# Patient Record
Sex: FEMALE | Race: WHITE | Employment: OTHER | URBAN - METROPOLITAN AREA
[De-identification: names, ages, dates, MRNs, and addresses within clinical notes are randomized per-mention and may not be internally consistent; named-entity substitution may affect disease eponyms.]

---

## 2017-05-24 ENCOUNTER — TRANSFERRED RECORDS (OUTPATIENT)
Dept: HEALTH INFORMATION MANAGEMENT | Facility: CLINIC | Age: 72
End: 2017-05-24

## 2017-09-29 ENCOUNTER — TRANSFERRED RECORDS (OUTPATIENT)
Dept: HEALTH INFORMATION MANAGEMENT | Facility: CLINIC | Age: 72
End: 2017-09-29

## 2018-01-26 ENCOUNTER — OFFICE VISIT (OUTPATIENT)
Dept: FAMILY MEDICINE | Facility: CLINIC | Age: 73
End: 2018-01-26
Payer: MEDICARE

## 2018-01-26 VITALS
WEIGHT: 239 LBS | BODY MASS INDEX: 43.98 KG/M2 | HEIGHT: 62 IN | SYSTOLIC BLOOD PRESSURE: 138 MMHG | HEART RATE: 92 BPM | DIASTOLIC BLOOD PRESSURE: 83 MMHG

## 2018-01-26 DIAGNOSIS — Z79.4 TYPE 2 DIABETES MELLITUS WITHOUT COMPLICATION, WITH LONG-TERM CURRENT USE OF INSULIN (H): Primary | ICD-10-CM

## 2018-01-26 DIAGNOSIS — E66.01 MORBID OBESITY (H): ICD-10-CM

## 2018-01-26 DIAGNOSIS — E11.9 TYPE 2 DIABETES MELLITUS WITHOUT COMPLICATION, WITH LONG-TERM CURRENT USE OF INSULIN (H): Primary | ICD-10-CM

## 2018-01-26 DIAGNOSIS — E11.9 TYPE 2 DIABETES MELLITUS WITHOUT COMPLICATION, WITH LONG-TERM CURRENT USE OF INSULIN (H): ICD-10-CM

## 2018-01-26 DIAGNOSIS — Z79.4 TYPE 2 DIABETES MELLITUS WITHOUT COMPLICATION, WITH LONG-TERM CURRENT USE OF INSULIN (H): ICD-10-CM

## 2018-01-26 DIAGNOSIS — E11.9 TYPE 2 DIABETES MELLITUS WITHOUT COMPLICATION, WITHOUT LONG-TERM CURRENT USE OF INSULIN (H): Primary | ICD-10-CM

## 2018-01-26 PROBLEM — F41.1 GENERALIZED ANXIETY DISORDER: Status: ACTIVE | Noted: 2018-01-26

## 2018-01-26 PROBLEM — E66.9 OBESITY (BMI 30-39.9): Status: ACTIVE | Noted: 2017-11-17

## 2018-01-26 LAB — HBA1C MFR BLD: 8 % (ref 4.3–6)

## 2018-01-26 PROCEDURE — 83036 HEMOGLOBIN GLYCOSYLATED A1C: CPT | Performed by: INTERNAL MEDICINE

## 2018-01-26 PROCEDURE — 80048 BASIC METABOLIC PNL TOTAL CA: CPT | Performed by: INTERNAL MEDICINE

## 2018-01-26 PROCEDURE — 36415 COLL VENOUS BLD VENIPUNCTURE: CPT | Performed by: INTERNAL MEDICINE

## 2018-01-26 PROCEDURE — 99214 OFFICE O/P EST MOD 30 MIN: CPT | Performed by: INTERNAL MEDICINE

## 2018-01-26 RX ORDER — INSULIN DETEMIR 100 [IU]/ML
INJECTION, SOLUTION SUBCUTANEOUS
Qty: 30 ML | Refills: 11 | Status: SHIPPED | OUTPATIENT
Start: 2018-01-26 | End: 2019-03-12

## 2018-01-26 RX ORDER — ACETAMINOPHEN 160 MG
2000 TABLET,DISINTEGRATING ORAL DAILY
COMMUNITY
Start: 2011-11-10

## 2018-01-26 RX ORDER — ATORVASTATIN CALCIUM 40 MG/1
40 TABLET, FILM COATED ORAL DAILY
COMMUNITY
End: 2018-01-26

## 2018-01-26 RX ORDER — IBUPROFEN 200 MG
400 TABLET ORAL DAILY PRN
COMMUNITY
Start: 2013-11-19

## 2018-01-26 RX ORDER — NICOTINE POLACRILEX 4 MG/1
20 GUM, CHEWING ORAL DAILY
COMMUNITY
Start: 2012-05-16

## 2018-01-26 RX ORDER — ATORVASTATIN CALCIUM 40 MG/1
40 TABLET, FILM COATED ORAL DAILY
COMMUNITY
End: 2018-05-06

## 2018-01-26 RX ORDER — IBUPROFEN 200 MG
TABLET ORAL
Qty: 100 EACH | Refills: 11 | Status: SHIPPED | OUTPATIENT
Start: 2018-01-26 | End: 2020-04-10

## 2018-01-26 RX ORDER — BUDESONIDE AND FORMOTEROL FUMARATE DIHYDRATE 160; 4.5 UG/1; UG/1
2 AEROSOL RESPIRATORY (INHALATION) DAILY PRN
COMMUNITY

## 2018-01-26 RX ORDER — ALBUTEROL SULFATE 90 UG/1
AEROSOL, METERED RESPIRATORY (INHALATION) DAILY PRN
COMMUNITY
Start: 2002-08-29

## 2018-01-26 RX ORDER — GLIMEPIRIDE 2 MG/1
2 TABLET ORAL DAILY
COMMUNITY
Start: 2014-10-14 | End: 2018-04-17

## 2018-01-26 RX ORDER — ALPRAZOLAM 0.25 MG
1 TABLET ORAL DAILY PRN
COMMUNITY
Start: 2016-10-07

## 2018-01-26 RX ORDER — MULTIVIT WITH MINERALS/LUTEIN
1 TABLET ORAL DAILY
COMMUNITY

## 2018-01-26 NOTE — MR AVS SNAPSHOT
After Visit Summary   1/26/2018    Anahi Gramajo    MRN: [id number]           Patient Information     Date Of Birth          1945        Visit Information        Provider Department      1/26/2018 11:00 AM Marco Goldstein MD [facility]        Today's Diagnoses     Type 2 diabetes mellitus without complication, without long-term current use of insulin (H)    -  1    Type 2 diabetes mellitus without complication, with long-term current use of insulin (H)          Care Instructions    Discussed the diabetes med use and insulin dosing  Raise dose Levemir as 50U-am and 54U-pm  Will update the Levemir vial and syringe Rx's  Check lab tests today including fasting lipids  See back in 3 mo, call if questions          Follow-ups after your visit        Follow-up notes from your care team     Return in about 3 months (around 4/26/2018).      Future tests that were ordered for you today     Open Future Orders        Priority Expected Expires Ordered    Lipid panel reflex to direct LDL Fasting Routine 1/26/2018 1/26/2019 1/26/2018            Who to contact     If you have questions or need follow up information about today's clinic visit or your schedule please contact [facility] directly at [phone].  Normal or non-critical lab and imaging results will be communicated to you by MyChart, letter or phone within 4 business days after the clinic has received the results. If you do not hear from us within 7 days, please contact the clinic through MindQuilthart or phone. If you have a critical or abnormal lab result, we will notify you by phone as soon as possible.  Submit refill requests through [employer] Platform or call your pharmacy and they will forward the refill request to us. Please allow 3 business days for your refill to be completed.          Additional Information About Your Visit        MyChart Information     [employer] Platform lets you send messages to your doctor, view your test results, renew  "your prescriptions, schedule appointments and more. To sign up, go to [email or web address] . Click on \"Log in\" on the left side of the screen, which will take you to the Welcome page. Then click on \"Sign up Now\" on the right side of the page.     You will be asked to enter the access code listed below, as well as some personal information. Please follow the directions to create your username and password.     Your access code is: [email or web address]  Expires: 2018 11:59 AM     Your access code will  in 90 days. If you need help or a new code, please call your [place] clinic or [phone].        Care EveryWhere ID     This is your Care EveryWhere ID. This could be used by other organizations to access your [place] medical records  [id number]        Your Vitals Were     Pulse Height Breastfeeding? BMI (Body Mass Index)          92 1.575 m (5' 2\") No 43.71 kg/m2         Blood Pressure from Last 3 Encounters:   18 138/83    Weight from Last 3 Encounters:   18 108.4 kg (239 lb)              We Performed the Following     Basic metabolic panel  (Ca, Cl, CO2, Creat, Gluc, K, Na, BUN)     Hemoglobin A1c        Primary Care Provider Office Phone # Fax #    [facility] [phone] [address]  [facility] [address]        Equal Access to Services     FARIDA TAMEZ : Hadii aad ku hadasho Soomaali, waaxda luqadaha, qaybta kaalmada adeegyada, waxay eliu torrez . So [facility] [phone].    ATENCIÓN: Si habla español, tiene a schroeder disposición servicios gratuitos de asistencia lingüística. Llame al [phone].    We comply with applicable federal civil rights laws and [place] laws. We do not discriminate on the basis of race, color, national origin, age, disability, sex, sexual orientation, or gender identity.            Thank you!     Thank you for choosing [facility]  for your care. Our goal is always to provide you with excellent " care. Hearing back from our patients is one way we can continue to improve our services. Please take a few minutes to complete the written survey that you may receive in the mail after your visit with us. Thank you!             Your Updated Medication List - Protect others around you: Learn how to safely use, store and throw away your medicines at www.disposemymeds.org.          This list is accurate as of 1/26/18 11:59 AM.  Always use your most recent med list.                   Brand Name Dispense Instructions for use Diagnosis    ACCU-CHEK COMPACT DRUM test strip   Generic drug:  blood glucose monitoring      As directed. Tests twice daily        albuterol 108 (90 BASE) MCG/ACT Inhaler    PROAIR HFA/PROVENTIL HFA/VENTOLIN HFA     Inhale into the lungs daily as needed        ALPRAZolam 0.25 MG tablet    XANAX     Take 1 tablet by mouth daily as needed        aspirin 81 MG EC tablet      Take 81 mg by mouth daily        atorvastatin 40 MG tablet    LIPITOR     Take 40 mg by mouth daily        BD IRINEO U/F 32G X 4 MM   Generic drug:  insulin pen needle      USE AS DIRECTED        budesonide-formoterol 160-4.5 MCG/ACT Inhaler    SYMBICORT     Inhale 2 puffs into the lungs daily as needed        CENTRUM SILVER per tablet      Take 1 tablet by mouth daily        glimepiride 2 MG tablet    AMARYL     Take 2 mg by mouth daily        ibuprofen 200 MG tablet    ADVIL/MOTRIN     Take 400 mg by mouth daily as needed        insulin detemir 100 UNIT/ML injection    LEVEMIR     Inject 90 Units Subcutaneous At Bedtime        metFORMIN 1000 MG tablet    GLUCOPHAGE     Take 1,000 mg by mouth 2 times daily (with meals)        RA OMEPRAZOLE 20 MG tablet   Generic drug:  omeprazole      Take 20 mg by mouth daily        RA VITAMIN B-12 TR 1000 MCG Tbcr   Generic drug:  cyanocobalamin      Take 1,000 mcg by mouth daily        vitamin D3 2000 UNITS Caps      Take 2,000 Units by mouth daily

## 2018-01-26 NOTE — NURSING NOTE
"Chief Complaint   Patient presents with     Diabetes       Initial /83 (BP Location: Left arm)  Pulse 92  Ht 5' 2\" (1.575 m)  Wt 239 lb (108.4 kg)  Breastfeeding? No  BMI 43.71 kg/m2 Estimated body mass index is 43.71 kg/(m^2) as calculated from the following:    Height as of this encounter: 5' 2\" (1.575 m).    Weight as of this encounter: 239 lb (108.4 kg).  Medication Reconciliation: complete     Jose Nance      "

## 2018-01-26 NOTE — PROGRESS NOTES
Name: Anahi Gramajo is a 72 yr old woman with previous history of T2DM.  Patient previously seen by me at my previous clinic ([facility]), here to establish care with [facility].    Chief Complaint   Patient presents with     Diabetes     HPI:  Recent issues:  Feeling pretty well recently, though concerns about her DM insulin Rx's  States she was running out of the Levemir (vial) insulin, recently had (large amt?) Lev vial Rx from the [facility] clinic, hasn't picked up yet      Previous history of T2DM, diagnosed ~age 62  Recalls taking oral DM meds, including metformin  Had seen Dr. AMITA Flood/Herb, also Dr. RAMSES Daniel  ~2012 Began use of daily insulin  Current DM meds:    Metformin 1000 mg BID   glimeparide 2 mg as 1-QAM and 2-QPM   Levemir 50U sq BID  Uses Accucheck Guide meter, typically tests QAM  Recent FBG trends 113-172 mg/dl range, no hypoglycemia  DM complications:  None known  Last [facility]    She [facility] Keila [facility] for gen med evaluations    PMH/PSH:  No past medical history on file.  No past surgical history on file.    Family Hx:  No family history on file.      Social Hx:  Social History     Social History     Marital status:      Spouse name: N/A     Number of children: N/A     Years of education: N/A     Occupational History     Not on file.     Social History Main Topics     Smoking status: Former Smoker     Smokeless tobacco: Never Used     Alcohol use No     Drug use: No     Sexual activity: No     Other Topics Concern     Not on file     Social History Narrative     No narrative on file          MEDICATIONS:  has a current medication list which includes the following prescription(s): albuterol, atorvastatin, aspirin, vitamin d3, omeprazole, ibuprofen, insulin pen needle, cyanocobalamin, insulin detemir, glimepiride, metformin, alprazolam, budesonide-formoterol, blood glucose  "monitoring, and centrum silver.    ROS:     ROS: 10 point ROS neg other than the symptoms noted above in the HPI.    GENERAL: no weight changes, fevers, chills, malaise, night sweats.   HEENT: no dysphagia, odonophagia, diplopia, neck pain or tenderness  THYROID:  no apparent hyper or hypothyroid symptoms  CV: no chest pain, pressure, palpitations, skipped beats  LUNGS: no SOB, PEREZ, cough, sputum production, wheezing   ABDOMEN: no diarrhea, constipation, abdominal pain  EXTREMITIES: no rashes, ulcers, edema  NEUROLOGY: no changes in vision, tingling or numbness in hands or feet.   MSK: no muscle aches or pains, weakness  SKIN: no rashes or lesions  GYN: regular menstrual cycling, no hot flashes or night sweats  ENDOCRINE: no heat or cold intolerance    Physical Exam   VS: /83 (BP Location: Left arm)  Pulse 92  Ht 1.575 m (5' 2\")  Wt 108.4 kg (239 lb)  Breastfeeding? No  BMI 43.71 kg/m2  GENERAL: AXOX3, NAD, well dressed, answering questions appropriately, appears stated age.  THYROID:  normal gland, no apparent nodules or goiter  HEENT: neck non-tender, no exopthalmous, no proptosis, EOMI, no lig lag, no retraction  CV: RRR, no rubs, gallops, no murmurs  LUNGS: CTAB, no wheezes, rales, or ronchi  ABDOMEN: soft, nontender, nondistended, +BS, no organomegaly  EXTREMITIES: no edema, +pulses, no rashes, no lesions  NEUROLOGY: CN grossly intact, + monofilament, + DTR upper and lower extremity, no tremors  MSK: grossly intact  SKIN: no rashes, no lesions    LABS:    All pertinent notes, labs, and images personally reviewed by me.     A/P:  Encounter Diagnoses   Name Primary?     Type 2 diabetes mellitus without complication, without long-term current use of insulin (H) Yes     Type 2 diabetes mellitus without complication, with long-term current use of insulin (H)      Morbid obesity (H)        Labs ordered today:   Orders Placed This Encounter   Procedures     Hemoglobin A1c     Lipid panel reflex to direct " LDL Fasting     Basic metabolic panel  (Ca, Cl, CO2, Creat, Gluc, K, Na, BUN)     I am pleased Mrs Gramajo feeling well.  Recent FBG mildly elevated    Discussed general issues with the diabetes mellitus diagnosis and management  Reviewed the pathophysiology of T2DM  Discussed the [id number] test which reflects previous overall glucose levels or control  Discussed the importance of blood glucose (BG) testing to assess glucose trends  Provided general overview of diabetes (oral and injectable) medication use    Recommended:  Continue current MF and glimeparide dose plan  Raise insulin doses as Levemir 50U QAM and 54U QPM  Continue to test FBG daily, goal target  mg/dl  Will update her Levemir vial and insulin syringe Rx's to her [facility] pharmacy  Continue atorvastatin 40 mg po QD dose plan  Keep focus on diet, exercise, and weight management  Advise having fasting lipid panel testing and dilated eye examination, at least annually    Addressed patient questions today    Radiology/Consults ordered today: None    More than 50% of the time spent with Ms. Gramajo on counseling / coordinating her care.  Total appointment time was 30 minutes.      Follow-up:  3 mo.    Marco Goldstein MD  Endocrinology  [facility]

## 2018-01-26 NOTE — PATIENT INSTRUCTIONS
Discussed the diabetes med use and insulin dosing  Raise dose Levemir as 50U-am and 54U-pm  Will update the Levemir vial and syringe Rx's  Check lab tests today including fasting lipids  See back in 3 mo, call if questions

## 2018-01-27 LAB
ANION GAP SERPL CALCULATED.3IONS-SCNC: 7 MMOL/L (ref 3–14)
BUN SERPL-MCNC: 22 MG/DL (ref 7–30)
CALCIUM SERPL-MCNC: 9.2 MG/DL (ref 8.5–10.1)
CHLORIDE SERPL-SCNC: 104 MMOL/L (ref 94–109)
CO2 SERPL-SCNC: 28 MMOL/L (ref 20–32)
CREAT SERPL-MCNC: 0.78 MG/DL (ref 0.52–1.04)
GFR SERPL CREATININE-BSD FRML MDRD: 73 ML/MIN/1.7M2
GLUCOSE SERPL-MCNC: 112 MG/DL (ref 70–99)
POTASSIUM SERPL-SCNC: 4.6 MMOL/L (ref 3.4–5.3)
SODIUM SERPL-SCNC: 139 MMOL/L (ref 133–144)

## 2018-02-01 ENCOUNTER — MYC MEDICAL ADVICE (OUTPATIENT)
Dept: FAMILY MEDICINE | Facility: CLINIC | Age: 73
End: 2018-02-01

## 2018-02-01 ENCOUNTER — TRANSFERRED RECORDS (OUTPATIENT)
Dept: HEALTH INFORMATION MANAGEMENT | Facility: CLINIC | Age: 73
End: 2018-02-01

## 2018-04-17 ENCOUNTER — OFFICE VISIT (OUTPATIENT)
Dept: ENDOCRINOLOGY | Facility: CLINIC | Age: 73
End: 2018-04-17
Payer: MEDICARE

## 2018-04-17 VITALS
HEART RATE: 104 BPM | HEIGHT: 62 IN | SYSTOLIC BLOOD PRESSURE: 152 MMHG | BODY MASS INDEX: 44.35 KG/M2 | DIASTOLIC BLOOD PRESSURE: 74 MMHG | WEIGHT: 241 LBS

## 2018-04-17 DIAGNOSIS — E11.9 TYPE 2 DIABETES MELLITUS WITHOUT COMPLICATION, WITH LONG-TERM CURRENT USE OF INSULIN (H): Primary | ICD-10-CM

## 2018-04-17 DIAGNOSIS — Z79.4 TYPE 2 DIABETES MELLITUS WITHOUT COMPLICATION, WITH LONG-TERM CURRENT USE OF INSULIN (H): ICD-10-CM

## 2018-04-17 DIAGNOSIS — E11.9 TYPE 2 DIABETES MELLITUS WITHOUT COMPLICATION, WITH LONG-TERM CURRENT USE OF INSULIN (H): ICD-10-CM

## 2018-04-17 DIAGNOSIS — Z79.4 TYPE 2 DIABETES MELLITUS WITHOUT COMPLICATION, WITH LONG-TERM CURRENT USE OF INSULIN (H): Primary | ICD-10-CM

## 2018-04-17 DIAGNOSIS — E78.5 HYPERLIPIDEMIA LDL GOAL <100: ICD-10-CM

## 2018-04-17 PROBLEM — J45.909 ASTHMA: Status: ACTIVE | Noted: 2018-04-17

## 2018-04-17 PROBLEM — F33.0 MILD EPISODE OF RECURRENT MAJOR DEPRESSIVE DISORDER (H): Status: ACTIVE | Noted: 2017-11-17

## 2018-04-17 PROBLEM — F41.9 ANXIETY: Status: ACTIVE | Noted: 2018-04-17

## 2018-04-17 PROBLEM — H40.9 GLAUCOMA: Status: ACTIVE | Noted: 2018-04-17

## 2018-04-17 PROBLEM — M19.90 DJD (DEGENERATIVE JOINT DISEASE): Status: ACTIVE | Noted: 2018-04-17

## 2018-04-17 PROBLEM — J45.30 MILD PERSISTENT ASTHMA WITHOUT COMPLICATION: Status: ACTIVE | Noted: 2017-11-17

## 2018-04-17 PROBLEM — I72.9 ANEURYSM (H): Status: ACTIVE | Noted: 2017-06-26

## 2018-04-17 PROBLEM — I67.1 CEREBRAL ANEURYSM: Status: ACTIVE | Noted: 2017-11-17

## 2018-04-17 PROCEDURE — 82043 UR ALBUMIN QUANTITATIVE: CPT | Performed by: INTERNAL MEDICINE

## 2018-04-17 PROCEDURE — 36415 COLL VENOUS BLD VENIPUNCTURE: CPT | Performed by: INTERNAL MEDICINE

## 2018-04-17 PROCEDURE — 84460 ALANINE AMINO (ALT) (SGPT): CPT | Performed by: INTERNAL MEDICINE

## 2018-04-17 PROCEDURE — 99214 OFFICE O/P EST MOD 30 MIN: CPT | Performed by: INTERNAL MEDICINE

## 2018-04-17 PROCEDURE — 80061 LIPID PANEL: CPT | Performed by: INTERNAL MEDICINE

## 2018-04-17 RX ORDER — GLIMEPIRIDE 2 MG/1
TABLET ORAL
Qty: 90 TABLET | Refills: 11 | Status: SHIPPED | OUTPATIENT
Start: 2018-04-17 | End: 2018-04-17

## 2018-04-17 NOTE — PROGRESS NOTES
Name: Anahi Gramajo    Chief Complaint   Patient presents with     Diabetes     HPI:  Recent issues:  Feeling pretty well recently, no new health issues reported        Previous history of T2DM, diagnosed ~age 62  Recalls taking oral DM meds, including metformin  Had seen Dr. AMITA Flood/Herb, also Dr. RAMSES Daniel  ~2012 Began use of daily insulin  Current DM meds:    Metformin 1000 mg BID   glimeparide 2 mg as 1-QAM and 2-QPM   Levemir 50U QAM and 54U QPM  Uses Accucheck Guide meter, typically tests QAM  Recent FBG trends 120-150 mg/dl, avg 146 mg/dl  DM complications:  None known  Last [facility] exam [facility]2017 [facility]    She sees Dr. Keila Stevens/[facility] for gen med evaluations    PMH/PSH:  Past Medical History:   Diagnosis Date     Depression      Hyperlipidemia      Obesity      Type 2 diabetes mellitus (H)      No past surgical history on file.    Family Hx:  No family history on file.      Social Hx:  Social History     Social History     Marital status:      Spouse name: N/A     Number of children: N/A     Years of education: N/A     Occupational History     Not on file.     Social History Main Topics     Smoking status: Former Smoker     Smokeless tobacco: Never Used     Alcohol use No     Drug use: No     Sexual activity: No     Other Topics Concern     Not on file     Social History Narrative          MEDICATIONS:  has a current medication list which includes the following prescription(s): albuterol, atorvastatin, aspirin, vitamin d3, omeprazole, ibuprofen, insulin pen needle, cyanocobalamin, glimepiride, metformin, alprazolam, budesonide-formoterol, blood glucose monitoring, centrum silver, levemir vial, and insulin syringe.    ROS:     ROS: 10 point ROS neg other than the symptoms noted above in the HPI.    GENERAL: no weight changes, fevers, chills, malaise, night sweats.   HEENT: no dysphagia, odonophagia, diplopia, neck pain or tenderness  THYROID:  no apparent hyper or  "hypothyroid symptoms  CV: no chest pain, pressure, palpitations, skipped beats  LUNGS: no SOB, PEREZ, cough, sputum production, wheezing   ABDOMEN: no diarrhea, constipation, abdominal pain  EXTREMITIES: no rashes, ulcers, edema  NEUROLOGY: no changes in vision, tingling or numbness in hands or feet.   MSK: no muscle aches or pains, weakness  SKIN: no rashes or lesions  GYN: regular menstrual cycling, no hot flashes or night sweats  ENDOCRINE: no heat or cold intolerance    Physical Exam   VS: /74 (BP Location: Right arm, Cuff Size: Adult Large)  Pulse 104  Ht 5' 2\" (1.575 m)  Wt 241 lb (109.3 kg)  BMI 44.08 kg/m2  GENERAL: AXOX3, NAD, well dressed, answering questions appropriately, appears stated age.  THYROID:  normal gland, no apparent nodules or goiter  ABDOMEN: obese, soft, nontender, nondistended    LABS:    All pertinent notes, labs, and images personally reviewed by me.     A/P:  Encounter Diagnoses   Name Primary?     Type 2 diabetes mellitus without complication, with long-term current use of insulin (H) Yes     Hyperlipidemia LDL goal <100        Labs ordered today:   Orders Placed This Encounter   Procedures     Lipid panel reflex to direct LDL Fasting     Albumin Random Urine Quantitative with Creat Ratio     ALT     Comments:  Reviewed health history and overall diabetes issues.    Plan:  Discussed general issues with the diabetes mellitus diagnosis and management  We discussed the [id number] test which reflects previous overall glucose levels or control  Reviewed the importance of blood glucose (BG) testing to assess glucose trends  Provided general overview of diabetes (oral and injectable) medication use    Recommended:  Continue current MF and glimeparide dose plan  Also continue the insulin doses as Levemir 50U QAM and 54U QPM  Test BG QD vs BID, goal target  mg/dl   Will update her MF and glimeparide Rx's to her [facility] pharmacy  Continue atorvastatin 40 mg po " QD dose plan  Check fasting lipid and urine micral today  Keep focus on diet, exercise, and weight management  Advise having fasting lipid panel testing and dilated eye examination, at least annually    Addressed patient questions today    Radiology/Consults ordered today: None    More than 50% of the time spent with Ms. Gramajo on counseling / coordinating her care.  Total appointment time was 25 minutes.    Follow-up:  4 mo.    Marco Goldstein MD  Endocrinology  [facility]

## 2018-04-17 NOTE — MR AVS SNAPSHOT
After Visit Summary   4/17/2018    Anahi Gramajo    MRN: [id number]           Patient Information     Date Of Birth          1945        Visit Information        Provider Department      4/17/2018 11:30 AM Marco Goldstein MD [facility]        Today's Diagnoses     Type 2 diabetes mellitus without complication, with long-term current use of insulin (H)    -  1    Hyperlipidemia LDL goal <100           Follow-ups after your visit        Follow-up notes from your care team     Return in about 4 months (around 8/17/2018).      Your next 10 appointments already scheduled     Aug 14, 2018 11:30 AM CDT   Return Visit with Marco Goldstein MD   [facility] ([facility]    [address] [place] [id number] [phone]              Who to contact     If you have questions or need follow up information about today's clinic visit or your schedule please contact [facility] directly at [phone].  Normal or non-critical lab and imaging results will be communicated to you by Transinsighthart, letter or phone within 4 business days after the clinic has received the results. If you do not hear from us within 7 days, please contact the clinic through [employer]ioMEMS or phone. If you have a critical or abnormal lab result, we will notify you by phone as soon as possible.  Submit refill requests through [employer]ioMEMS or call your pharmacy and they will forward the refill request to us. Please allow 3 business days for your refill to be completed.          Additional Information About Your Visit        Transinsighthart Information     CardioMEMS gives you secure access to your electronic health record. If you see a primary care provider, you can also send messages to your care team and make appointments. If you have questions, please call your primary care clinic.  If you do not have a primary care provider, please call [phone] and they will assist you.        Care  "EveryWhere ID     This is your Care EveryWhere ID. This could be used by other organizations to access your [place] medical records  [id number]        Your Vitals Were     Pulse Height BMI (Body Mass Index)             104 5' 2\" (1.575 m) 44.08 kg/m2          Blood Pressure from Last 3 Encounters:   04/17/18 152/74   01/26/18 138/83    Weight from Last 3 Encounters:   04/17/18 241 lb (109.3 kg)   01/26/18 239 lb (108.4 kg)              We Performed the Following     Albumin Random Urine Quantitative with Creat Ratio     ALT     Lipid panel reflex to direct LDL Fasting        Primary Care Provider Office Phone # Fax #    [facility] [phone] [address]  [facility] [address]        Equal Access to Services     FARIDA TAMEZ : Hadii aad ku hadasho Soomaali, waaxda luqadaha, qaybta kaalmada adeegyada, imelda nowak hayvale torrez . So [facility] [phone].    ATENCIÓN: Si habla español, tiene a schroeder disposición servicios gratuitos de asistencia lingüística. Llame al [phone].    We comply with applicable federal civil rights laws and [place] laws. We do not discriminate on the basis of race, color, national origin, age, disability, sex, sexual orientation, or gender identity.            Thank you!     Thank you for choosing [facility]  for your care. Our goal is always to provide you with excellent care. Hearing back from our patients is one way we can continue to improve our services. Please take a few minutes to complete the written survey that you may receive in the mail after your visit with us. Thank you!             Your Updated Medication List - Protect others around you: Learn how to safely use, store and throw away your medicines at www.disposemymeds.org.          This list is accurate as of 4/17/18  6:48 PM.  Always use your most recent med list.                   Brand Name Dispense Instructions for use Diagnosis    ACCU-CHEK COMPACT DRUM " "test strip   Generic drug:  blood glucose monitoring      As directed. Tests twice daily        albuterol 108 (90 Base) MCG/ACT Inhaler    PROAIR HFA/PROVENTIL HFA/VENTOLIN HFA     Inhale into the lungs daily as needed        ALPRAZolam 0.25 MG tablet    XANAX     Take 1 tablet by mouth daily as needed        aspirin 81 MG EC tablet      Take 81 mg by mouth daily        atorvastatin 40 MG tablet    LIPITOR     Take 40 mg by mouth daily        BD IRINEO U/F 32G X 4 MM   Generic drug:  insulin pen needle      USE AS DIRECTED        budesonide-formoterol 160-4.5 MCG/ACT Inhaler    SYMBICORT     Inhale 2 puffs into the lungs daily as needed        CENTRUM SILVER per tablet      Take 1 tablet by mouth daily        glimepiride 2 MG tablet    AMARYL     Take 2 mg by mouth daily        ibuprofen 200 MG tablet    ADVIL/MOTRIN     Take 400 mg by mouth daily as needed        Insulin Syringe 29G X 1/2\" 1 ML Misc    SAFETY INSULIN SYRINGES    100 each    Use for sq insulin injections BID    Type 2 diabetes mellitus without complication, with long-term current use of insulin (H)       LEVEMIR VIAL 100 UNIT/ML injection   Generic drug:  insulin detemir     30 mL    Inject 50 units sq in morning and 54 units sq in evening, as directed    Type 2 diabetes mellitus without complication, with long-term current use of insulin (H)       metFORMIN 1000 MG tablet    GLUCOPHAGE     Take 1,000 mg by mouth 2 times daily (with meals)        RA OMEPRAZOLE 20 MG tablet   Generic drug:  omeprazole      Take 20 mg by mouth daily        RA VITAMIN B-12 TR 1000 MCG Tbcr   Generic drug:  cyanocobalamin      Take 1,000 mcg by mouth daily        vitamin D3 2000 units Caps      Take 2,000 Units by mouth daily          "

## 2018-04-17 NOTE — NURSING NOTE
"Chief Complaint   Patient presents with     Diabetes       Initial /74 (BP Location: Right arm, Cuff Size: Adult Large)  Pulse 104  Ht 5' 2\" (1.575 m)  Wt 241 lb (109.3 kg)  BMI 44.08 kg/m2 Estimated body mass index is 44.08 kg/(m^2) as calculated from the following:    Height as of this encounter: 5' 2\" (1.575 m).    Weight as of this encounter: 241 lb (109.3 kg).  Medication Reconciliation: complete       Jose Nance      "

## 2018-04-18 LAB
CREAT UR-MCNC: 152 MG/DL
MICROALBUMIN UR-MCNC: 20 MG/L
MICROALBUMIN/CREAT UR: 12.96 MG/G CR (ref 0–25)

## 2018-04-18 RX ORDER — GLIMEPIRIDE 2 MG/1
TABLET ORAL
Qty: 270 TABLET | Refills: 3 | Status: SHIPPED | OUTPATIENT
Start: 2018-04-18 | End: 2019-05-03

## 2018-04-18 NOTE — TELEPHONE ENCOUNTER
"Rx sent yesterday for #90 with 11 refills  Pharmacy requesting 90 day supply instead  Prescription approved per [facility]ill [facility].  Brandie HERNÁNDEZ, ADAN    Requested Prescriptions   Pending Prescriptions Disp Refills     glimepiride (AMARYL) 2 MG tablet [Pharmacy Med Name: GLIMEPIRIDE 2MG TABLETS] 270 tablet 11     Sig: TAKE 1 TABLET BY MOUTH IN THE MORNING AND 2 TABLETS IN THE EVENING AS DIRECTED    Sulfonylurea Agents Failed    4/17/2018  8:21 PM       Failed - Blood pressure less than 140/90 in past 6 months    BP Readings from Last 3 Encounters:   04/17/18 152/74   01/26/18 138/83                Failed - Patient has documented LDL within the past 12 mos.    No lab results found.         Failed - Patient has had a Microalbumin in the past 12 mos.    No lab results found.         Passed - Patient has documented A1c within the specified period of time.    Recent Labs   Lab Test  01/26/18   1223   A1C  8.0*            Passed - Patient is age 18 or older       Passed - No active pregnancy on record       Passed - Patient has a recent creatinine (normal) within the past 12 mos.    Recent Labs   Lab Test  01/26/18   1223   CR  0.78            Passed - Patient has not had a positive pregnancy test within the past 12 mos.       Passed - Recent (6 mo) or future (30 days) visit within the authorizing provider's specialty    Patient had office visit in the last 6 months or has a visit in the next 30 days with authorizing provider or within the authorizing provider's specialty.  See \"Patient Info\" tab in inbasket, or \"Choose Columns\" in Meds & Orders section of the refill encounter.              "

## 2018-04-19 LAB
ALT SERPL W P-5'-P-CCNC: 36 U/L (ref 0–50)
CHOLEST SERPL-MCNC: 129 MG/DL
HDLC SERPL-MCNC: 41 MG/DL
LDLC SERPL CALC-MCNC: 57 MG/DL
NONHDLC SERPL-MCNC: 88 MG/DL
TRIGL SERPL-MCNC: 154 MG/DL

## 2018-04-25 DIAGNOSIS — E11.9 TYPE 2 DIABETES MELLITUS WITHOUT COMPLICATION, WITH LONG-TERM CURRENT USE OF INSULIN (H): ICD-10-CM

## 2018-04-25 DIAGNOSIS — Z79.4 TYPE 2 DIABETES MELLITUS WITHOUT COMPLICATION, WITH LONG-TERM CURRENT USE OF INSULIN (H): ICD-10-CM

## 2018-04-25 NOTE — TELEPHONE ENCOUNTER
"Last Written Prescription Date:  4/17/18  Last Fill Quantity: 60 tablet,  # refills: 11   Last office visit: 4/17/2018 with prescribing provider:  Angelita   Future Office Visit:      Requested Prescriptions   Pending Prescriptions Disp Refills     metFORMIN (GLUCOPHAGE) 1000 MG tablet [Pharmacy Med Name: METFORMIN 1000MG TABLETS] 180 tablet 11     Sig: TAKE 1 TABLET(1000 MG) BY MOUTH TWICE DAILY WITH MEALS    Biguanide Agents Failed    4/25/2018 10:02 AM       Failed - Blood pressure less than 140/90 in past 6 months    BP Readings from Last 3 Encounters:   04/17/18 152/74   01/26/18 138/83                Passed - Patient has documented LDL within the past 12 mos.    Recent Labs   Lab Test  04/17/18   1200   LDL  57            Passed - Patient has had a Microalbumin in the past 12 mos.    Recent Labs   Lab Test  04/17/18   1206   MICROL  20   UMALCR  12.96            Passed - Patient is age 10 or older       Passed - Patient has documented A1c within the specified period of time.    Recent Labs   Lab Test  01/26/18   1223   A1C  8.0*            Passed - Patient's CR is NOT>1.4 OR Patient's EGFR is NOT<45 within past 12 mos.    Recent Labs   Lab Test  01/26/18   1223   GFRESTIMATED  73   GFRESTBLACK  88       Recent Labs   Lab Test  01/26/18   1223   CR  0.78            Passed - Patient does NOT have a diagnosis of CHF.       Passed - Patient is not pregnant       Passed - Patient has not had a positive pregnancy test within the past 12 mos.        Passed - Recent (6 mo) or future (30 days) visit within the authorizing provider's specialty    Patient had office visit in the last 6 months or has a visit in the next 30 days with authorizing provider or within the authorizing provider's specialty.  See \"Patient Info\" tab in inbasket, or \"Choose Columns\" in Meds & Orders section of the refill encounter.              "

## 2018-05-04 ENCOUNTER — MYC MEDICAL ADVICE (OUTPATIENT)
Dept: ENDOCRINOLOGY | Facility: CLINIC | Age: 73
End: 2018-05-04

## 2018-05-04 DIAGNOSIS — E78.5 HYPERLIPIDEMIA LDL GOAL <130: Primary | ICD-10-CM

## 2018-05-04 DIAGNOSIS — E78.5 HYPERLIPIDEMIA LDL GOAL <100: ICD-10-CM

## 2018-05-06 RX ORDER — ATORVASTATIN CALCIUM 40 MG/1
40 TABLET, FILM COATED ORAL DAILY
Qty: 90 TABLET | Refills: 3 | Status: SHIPPED | OUTPATIENT
Start: 2018-05-06 | End: 2019-05-03

## 2018-08-14 ENCOUNTER — OFFICE VISIT (OUTPATIENT)
Dept: ENDOCRINOLOGY | Facility: CLINIC | Age: 73
End: 2018-08-14
Payer: MEDICARE

## 2018-08-14 VITALS — SYSTOLIC BLOOD PRESSURE: 144 MMHG | BODY MASS INDEX: 44.45 KG/M2 | WEIGHT: 243 LBS | DIASTOLIC BLOOD PRESSURE: 78 MMHG

## 2018-08-14 DIAGNOSIS — E11.9 TYPE 2 DIABETES MELLITUS WITHOUT COMPLICATION, WITH LONG-TERM CURRENT USE OF INSULIN (H): Primary | ICD-10-CM

## 2018-08-14 DIAGNOSIS — Z79.4 TYPE 2 DIABETES MELLITUS WITHOUT COMPLICATION, WITH LONG-TERM CURRENT USE OF INSULIN (H): Primary | ICD-10-CM

## 2018-08-14 LAB — HBA1C MFR BLD: 8 % (ref 0–5.6)

## 2018-08-14 PROCEDURE — 84460 ALANINE AMINO (ALT) (SGPT): CPT | Performed by: INTERNAL MEDICINE

## 2018-08-14 PROCEDURE — 99214 OFFICE O/P EST MOD 30 MIN: CPT | Performed by: INTERNAL MEDICINE

## 2018-08-14 PROCEDURE — 83036 HEMOGLOBIN GLYCOSYLATED A1C: CPT | Performed by: INTERNAL MEDICINE

## 2018-08-14 PROCEDURE — 84443 ASSAY THYROID STIM HORMONE: CPT | Performed by: INTERNAL MEDICINE

## 2018-08-14 PROCEDURE — 80048 BASIC METABOLIC PNL TOTAL CA: CPT | Performed by: INTERNAL MEDICINE

## 2018-08-14 PROCEDURE — 36415 COLL VENOUS BLD VENIPUNCTURE: CPT | Performed by: INTERNAL MEDICINE

## 2018-08-14 NOTE — PROGRESS NOTES
Name: Anahi Gramajo    Chief Complaint   Patient presents with     Diabetes     HPI:  Recent issues:  Here for f/u diabetes evaluation appt  Feeling pretty well recently, but knee pains.        Previous history of T2DM, diagnosed ~age 62  Recalls taking oral DM meds, including metformin  Had seen Dr. AMITA Flood/Herb, also Dr. RAMSES Daniel  ~2012 Began use of daily insulin  Current DM meds:    Metformin 1000 mg BID   glimeparide 2 mg as 1-QAM and 2-QPM   Levemir 50U QAM and 54U QPM  Uses Accucheck Guide meter, typically tests QAM  Recent FBG trends  mg/dl, avg 153 mg/dl  Lab Results   Component Value Date    A1C 8.0 (H) 08/14/2018    A1C 8.0 (H) 01/26/2018     DM complications:  None known  Last [facility]    She sees Dr. Keila Stevens/[facility] for gen med evaluations    PMH/PSH:  Past Medical History:   Diagnosis Date     Depression      Hyperlipidemia      Obesity      Type 2 diabetes mellitus (H)      No past surgical history on file.    Family Hx:  No family history on file.      Social Hx:  Social History     Social History     Marital status:      Spouse name: N/A     Number of children: N/A     Years of education: N/A     Occupational History     Not on file.     Social History Main Topics     Smoking status: Former Smoker     Smokeless tobacco: Never Used     Alcohol use No     Drug use: No     Sexual activity: No     Other Topics Concern     Not on file     Social History Narrative          MEDICATIONS:  has a current medication list which includes the following prescription(s): albuterol, alprazolam, aspirin, atorvastatin, blood glucose monitoring, budesonide-formoterol, vitamin d3, cyanocobalamin, glimepiride, ibuprofen, insulin syringe, levemir vial, metformin, centrum silver, and omeprazole.    ROS:     ROS: 10 point ROS neg other than the symptoms noted above in the HPI.    GENERAL: mild fatigue, wt stable; fevers, chills, malaise, night sweats.    HEENT: no dysphagia, odonophagia, diplopia, neck pain or tenderness  THYROID:  no apparent hyper or hypothyroid symptoms  CV: no chest pain, pressure, palpitations, skipped beats  LUNGS: no SOB, PEREZ, cough, sputum production, wheezing   ABDOMEN: no diarrhea, constipation, abdominal pain  EXTREMITIES: no rashes, ulcers, edema  NEUROLOGY: no changes in vision, tingling or numbness in hands or feet.   MSK: pains at knees, low back, and left shoulder; denies muscle weakness  SKIN: no rashes or lesions  GYN: regular menstrual cycling, no hot flashes or night sweats  ENDOCRINE: no heat or cold intolerance    Physical Exam   VS: /78 (BP Location: Right arm, Patient Position: Sitting, Cuff Size: Adult Large)  Wt 110.2 kg (243 lb)  BMI 44.45 kg/m2  GENERAL: AXOX3, NAD, well dressed, answering questions appropriately, appears stated age.  THYROID:  normal gland, no apparent nodules or goiter  ABDOMEN: obese, soft, nontender, nondistended    LABS:    All pertinent notes, labs, and images personally reviewed by me.     A/P:  Encounter Diagnosis   Name Primary?     Type 2 diabetes mellitus without complication, with long-term current use of insulin (H) Yes       Labs ordered today:   Orders Placed This Encounter   Procedures     Basic metabolic panel     Hemoglobin A1c     TSH     ALT     Comments:  Reviewed health history and overall diabetes issues.    Plan:  Discussed general issues with the diabetes mellitus diagnosis and management  We discussed the [id number] test which reflects previous overall glucose levels or control  Reviewed the importance of blood glucose (BG) testing to assess glucose trends  Provided general overview of diabetes (oral and injectable) medication use    Recommended:  Continue current MF and glimeparide dose plan  Also continue the insulin doses as Levemir 50U QAM and 58U QPM  Need more glucose trend information   Test BG QD vs BID, goal target  mg/dl   Consider wearing LibrePro  CGMS sensor with CDE evaluation  Continue atorvastatin 40 mg po QD dose plan  Check fasting lipid and urine micral today  Keep focus on diet, exercise, and weight management  Advise having fasting lipid panel testing and dilated eye examination, at least annually    Addressed patient questions today    Radiology/Consults ordered today: None    More than 50% of the time spent with Ms. Gramajo on counseling / coordinating her care.  Total appointment time was 25 minutes.    Follow-up:  6 mo.    Marco Goldstein MD  Endocrinology  [facility]

## 2018-08-14 NOTE — MR AVS SNAPSHOT
After Visit Summary   8/14/2018    Anahi Gramajo    MRN: [id number]           Patient Information     Date Of Birth          1945        Visit Information        Provider Department      8/14/2018 11:30 AM Marco Goldstein MD [facility]        Today's Diagnoses     Type 2 diabetes mellitus without complication, with long-term current use of insulin (H)    -  1       Follow-ups after your visit        Follow-up notes from your care team     Return in about 4 months (around 12/14/2018).      Your next 10 appointments already scheduled     Dec 17, 2018 11:00 AM CST   Return Visit with Marco Goldstein MD   [facility] ([facility]    [address] [place] [id number] [phone]              Who to contact     If you have questions or need follow up information about today's clinic visit or your schedule please contact [facility] directly at [phone].  Normal or non-critical lab and imaging results will be communicated to you by [employer], letter or phone within 4 business days after the clinic has received the results. If you do not hear from us within 7 days, please contact the clinic through [employer] or phone. If you have a critical or abnormal lab result, we will notify you by phone as soon as possible.  Submit refill requests through "[employer]" or call your pharmacy and they will forward the refill request to us. Please allow 3 business days for your refill to be completed.          Additional Information About Your Visit        MyChart Information     "[employer]" gives you secure access to your electronic health record. If you see a primary care provider, you can also send messages to your care team and make appointments. If you have questions, please call your primary care clinic.  If you do not have a primary care provider, please call [phone] and they will assist you.        Care EveryWhere ID     This is your Care  EveryWhere ID. This could be used by other organizations to access your [facility] medical records  [id number]        Your Vitals Were     BMI (Body Mass Index)                   44.45 kg/m2            Blood Pressure from Last 3 Encounters:   08/14/18 144/78   04/17/18 152/74   01/26/18 138/83    Weight from Last 3 Encounters:   08/14/18 110.2 kg (243 lb)   04/17/18 109.3 kg (241 lb)   01/26/18 108.4 kg (239 lb)              We Performed the Following     ALT     Basic metabolic panel     Hemoglobin A1c     TSH        Primary Care Provider Office Phone # Fax #    [facility] [phone] [address]  [facility] [id number]        Equal Access to Services     FARIDA TAMEZ : Hadii aad ku hadasho Soomaali, waaxda luqadaha, qaybta kaalmada adeegyada, imelda torrez . So [facility] [phone].    ATENCIÓN: Si habla español, tiene a schroeder disposición servicios gratuitos de asistencia lingüística. Llame al [phone].    We comply with applicable federal civil rights laws and [place] laws. We do not discriminate on the basis of race, color, national origin, age, disability, sex, sexual orientation, or gender identity.            Thank you!     Thank you for choosing [facility]  for your care. Our goal is always to provide you with excellent care. Hearing back from our patients is one way we can continue to improve our services. Please take a few minutes to complete the written survey that you may receive in the mail after your visit with us. Thank you!             Your Updated Medication List - Protect others around you: Learn how to safely use, store and throw away your medicines at www.disposemymeds.org.          This list is accurate as of 8/14/18  4:42 PM.  Always use your most recent med list.                   Brand Name Dispense Instructions for use Diagnosis    ACCU-CHEK COMPACT DRUM test strip   Generic drug:  blood glucose monitoring      As  "directed. Tests twice daily        albuterol 108 (90 Base) MCG/ACT inhaler    PROAIR HFA/PROVENTIL HFA/VENTOLIN HFA     Inhale into the lungs daily as needed        ALPRAZolam 0.25 MG tablet    XANAX     Take 1 tablet by mouth daily as needed        aspirin 81 MG EC tablet      Take 81 mg by mouth daily        atorvastatin 40 MG tablet    LIPITOR    90 tablet    Take 1 tablet (40 mg) by mouth daily    Hyperlipidemia LDL goal <100       budesonide-formoterol 160-4.5 MCG/ACT Inhaler    SYMBICORT     Inhale 2 puffs into the lungs daily as needed        CENTRUM SILVER per tablet      Take 1 tablet by mouth daily        glimepiride 2 MG tablet    AMARYL    270 tablet    TAKE 1 TABLET BY MOUTH IN THE MORNING AND 2 TABLETS IN THE EVENING AS DIRECTED    Type 2 diabetes mellitus without complication, with long-term current use of insulin (H)       ibuprofen 200 MG tablet    ADVIL/MOTRIN     Take 400 mg by mouth daily as needed        Insulin Syringe 29G X 1/2\" 1 ML Misc    SAFETY INSULIN SYRINGES    100 each    Use for sq insulin injections BID    Type 2 diabetes mellitus without complication, with long-term current use of insulin (H)       LEVEMIR VIAL 100 UNIT/ML injection   Generic drug:  insulin detemir     30 mL    Inject 50 units sq in morning and 54 units sq in evening, as directed    Type 2 diabetes mellitus without complication, with long-term current use of insulin (H)       metFORMIN 1000 MG tablet    GLUCOPHAGE    180 tablet    TAKE 1 TABLET(1000 MG) BY MOUTH TWICE DAILY WITH MEALS    Type 2 diabetes mellitus without complication, with long-term current use of insulin (H)       RA OMEPRAZOLE 20 MG tablet   Generic drug:  omeprazole      Take 20 mg by mouth daily        RA VITAMIN B-12 TR 1000 MCG Tbcr   Generic drug:  cyanocobalamin      Take 1,000 mcg by mouth daily        vitamin D3 2000 units Caps      Take 2,000 Units by mouth daily          "

## 2018-08-15 LAB
ALT SERPL W P-5'-P-CCNC: 42 U/L (ref 0–50)
ANION GAP SERPL CALCULATED.3IONS-SCNC: 7 MMOL/L (ref 3–14)
BUN SERPL-MCNC: 16 MG/DL (ref 7–30)
CALCIUM SERPL-MCNC: 8.9 MG/DL (ref 8.5–10.1)
CHLORIDE SERPL-SCNC: 106 MMOL/L (ref 94–109)
CO2 SERPL-SCNC: 27 MMOL/L (ref 20–32)
CREAT SERPL-MCNC: 0.7 MG/DL (ref 0.52–1.04)
GFR SERPL CREATININE-BSD FRML MDRD: 82 ML/MIN/1.7M2
GLUCOSE SERPL-MCNC: 75 MG/DL (ref 70–99)
POTASSIUM SERPL-SCNC: 4.1 MMOL/L (ref 3.4–5.3)
SODIUM SERPL-SCNC: 140 MMOL/L (ref 133–144)
TSH SERPL DL<=0.005 MIU/L-ACNC: 1.92 MU/L (ref 0.4–4)

## 2018-12-11 ENCOUNTER — TELEPHONE (OUTPATIENT)
Dept: ENDOCRINOLOGY | Facility: CLINIC | Age: 73
End: 2018-12-11

## 2018-12-11 NOTE — TELEPHONE ENCOUNTER
Reason for Call:  FYI     Detailed comments: Pt wanted to let DR. Goldstein know that she had to cancel her apt on 12/17 due to having Shoulder surgery on 1/2- pt states she will have her A1C checked during her Pre Op physical    Phone Number Patient can be reached at: Home number on file [phone] (home)    Best Time: any    Can we leave a detailed message on this number? YES    Call taken on 12/11/2018 at 8:54 AM by Maria T Ceja

## 2019-03-12 ENCOUNTER — TELEPHONE (OUTPATIENT)
Dept: ENDOCRINOLOGY | Facility: CLINIC | Age: 74
End: 2019-03-12

## 2019-03-12 DIAGNOSIS — Z79.4 TYPE 2 DIABETES MELLITUS WITHOUT COMPLICATION, WITH LONG-TERM CURRENT USE OF INSULIN (H): ICD-10-CM

## 2019-03-12 DIAGNOSIS — E11.9 TYPE 2 DIABETES MELLITUS WITHOUT COMPLICATION, WITH LONG-TERM CURRENT USE OF INSULIN (H): ICD-10-CM

## 2019-03-12 RX ORDER — INSULIN DETEMIR 100 [IU]/ML
INJECTION, SOLUTION SUBCUTANEOUS
Qty: 30 ML | Refills: 11 | Status: SHIPPED | OUTPATIENT
Start: 2019-03-12 | End: 2020-01-31

## 2019-03-12 NOTE — TELEPHONE ENCOUNTER
Reason for Call:  Med Question     Detailed comments: Pt is calling regarding her LEVEMIR VIAL 100 UNIT/ML soln    Pt states when she contacted the pharmacy she was told that Dr. Goldstein cancelled this Rx in December pt states she is unaware of this     Phone Number Patient can be reached at: Home number on file [phone] (home)    Best Time: any    Can we leave a detailed message on this number? YES    Call taken on 3/12/2019 at 2:32 PM by Maria T Ceja

## 2019-03-12 NOTE — TELEPHONE ENCOUNTER
Dr. Goldstein,    Was pt suppose to stop taking Levemir in December? Pharmacy told pt you cancelled this in December.  Please advise.    Thank you,  Colin ASKEW RN

## 2019-03-13 NOTE — TELEPHONE ENCOUNTER
Message noted.  I am unaware of any advice to stop the Levemir insulin use... Unless she wished to change to a once-per-day basal insulin.  We have not sent any prescriptions for other insulins... So I advise she continue the current Levemir twice per day plan.  I have sent the Levemir Rx to her pharmacy.  Please relay message, thanks.    ELIJAH Goldstein MD  Endocrinology  [facility]

## 2019-03-25 ENCOUNTER — OFFICE VISIT (OUTPATIENT)
Dept: ENDOCRINOLOGY | Facility: CLINIC | Age: 74
End: 2019-03-25
Payer: MEDICARE

## 2019-03-25 VITALS
BODY MASS INDEX: 44.35 KG/M2 | HEIGHT: 62 IN | DIASTOLIC BLOOD PRESSURE: 73 MMHG | WEIGHT: 241 LBS | SYSTOLIC BLOOD PRESSURE: 151 MMHG | HEART RATE: 89 BPM

## 2019-03-25 DIAGNOSIS — Z79.4 TYPE 2 DIABETES MELLITUS WITHOUT COMPLICATION, WITH LONG-TERM CURRENT USE OF INSULIN (H): Primary | ICD-10-CM

## 2019-03-25 DIAGNOSIS — E66.01 MORBID OBESITY (H): ICD-10-CM

## 2019-03-25 DIAGNOSIS — E11.9 TYPE 2 DIABETES MELLITUS WITHOUT COMPLICATION, WITH LONG-TERM CURRENT USE OF INSULIN (H): Primary | ICD-10-CM

## 2019-03-25 PROCEDURE — 99214 OFFICE O/P EST MOD 30 MIN: CPT | Performed by: INTERNAL MEDICINE

## 2019-03-25 ASSESSMENT — MIFFLIN-ST. JEOR: SCORE: 1556.42

## 2019-03-25 NOTE — PROGRESS NOTES
Name: Anahi Gramajo    Chief Complaint   Patient presents with     Diabetes     HPI:  Recent issues:  Here for f/u diabetes evaluation   Had left shoulder rotator cuff surgery in 1/2019  Recent URI symptoms with wheezing, also pill dysphagia... Now on Prednisone, plan for upper endoscopy          Previous history of T2DM, diagnosed ~age 62  Recalls taking oral DM meds, including metformin  Had seen Dr. AMITA Flood/[facility], also Dr. RAMSES Daniel  ~2012 Began use of daily insulin  Current DM meds:    Metformin 1000 mg BID   glimeparide 2 mg as 1-QAM and 2-QPM   Levemir 50U QAM and 54U QPM  Uses Accucheck Guide meter, typically tests QAM   No recent BG meter data   Perceives higher afternoon BG levels while on Prednisone    Previous [facility] labs include:      Recent [facility] labs include:  Lab Results   Component Value Date    A1C 8.0 (H) 08/14/2018     08/14/2018    POTASSIUM 4.1 08/14/2018    CHLORIDE 106 08/14/2018    CO2 27 08/14/2018    ANIONGAP 7 08/14/2018    GLC 75 08/14/2018    BUN 16 08/14/2018    CR 0.70 08/14/2018    GFRESTIMATED 82 08/14/2018    GFRESTBLACK >90 08/14/2018    LESTER 8.9 08/14/2018    CHOL 129 04/17/2018    TRIG 154 (H) 04/17/2018    HDL 41 (L) 04/17/2018    LDL 57 04/17/2018    NHDL 88 04/17/2018    UCRR 152 04/17/2018    MICROL 20 04/17/2018    UMALCR 12.96 04/17/2018     Recent [facility] labs include:        Last [facility] exam ~[facility]  DM complications:  None known      She sees Dr. Keila Stevens[facility] for gen med evaluations    PMH/PSH:  Past Medical History:   Diagnosis Date     Asthma      Depression      Hyperlipidemia      Obesity      Type 2 diabetes mellitus (H)      No past surgical history on file.    Family Hx:  No family history on file.      Social Hx:  Social History     Socioeconomic History     Marital status:      Spouse name: Not on file     Number of children: Not on file     Years of education: Not on file     Highest education level: Not on file    Occupational History     Not on file   Social Needs     Financial resource strain: Not on file     Food insecurity:     Worry: Not on file     Inability: Not on file     Transportation needs:     Medical: Not on file     Non-medical: Not on file   Tobacco Use     Smoking status: Former Smoker     Smokeless tobacco: Never Used   Substance and Sexual Activity     Alcohol use: No     Drug use: No     Sexual activity: No     Partners: Male   Lifestyle     Physical activity:     Days per week: Not on file     Minutes per session: Not on file     Stress: Not on file   Relationships     Social connections:     Talks on phone: Not on file     Gets together: Not on file     Attends Confucianism service: Not on file     Active member of club or organization: Not on file     Attends meetings of clubs or organizations: Not on file     Relationship status: Not on file     Intimate partner violence:     Fear of current or ex partner: Not on file     Emotionally abused: Not on file     Physically abused: Not on file     Forced sexual activity: Not on file   Other Topics Concern     Not on file   Social History Narrative     Not on file          MEDICATIONS:  has a current medication list which includes the following prescription(s): albuterol, alprazolam, aspirin, atorvastatin, blood glucose, budesonide-formoterol, vitamin d3, vitamin b-12, glimepiride, ibuprofen, insulin nph, insulin syringe-needle u-100, levemir vial, metformin, multivitamin, and omeprazole.    ROS:     ROS: 10 point ROS neg other than the symptoms noted above in the HPI.    GENERAL: mild fatigue, wt stable; fevers, chills, malaise, night sweats.   HEENT: no dysphagia, odonophagia, diplopia, neck pain or tenderness  THYROID:  no apparent hyper or hypothyroid symptoms  CV: no chest pain, pressure, palpitations, skipped beats  LUNGS: recent wheezing; no SOB, PEREZ, cough  ABDOMEN: no diarrhea, constipation, abdominal pain  EXTREMITIES: no rashes, ulcers,  "edema  NEUROLOGY: no changes in vision, tingling or numbness in hands or feet.   MSK: pains at knees, low back, and left shoulder; denies muscle weakness  SKIN: no rashes or lesions  GYN: no menses; no hot flashes or night sweats  ENDOCRINE: no heat or cold intolerance    Physical Exam   VS: /73   Pulse 89   Ht 1.575 m (5' 2\")   Wt 109.3 kg (241 lb)   BMI 44.08 kg/m    GENERAL: AXOX3, NAD, well dressed, answering questions appropriately, appears stated age.  THYROID:  normal gland, no apparent nodules or goiter  ABDOMEN: obese, soft, nontender, nondistended    LABS:    All pertinent notes, labs, and images personally reviewed by me.     A/P:  Encounter Diagnoses   Name Primary?     Type 2 diabetes mellitus without complication, with long-term current use of insulin (H) Yes     Morbid obesity (H)      Comments:  Reviewed health history and overall diabetes issues.    Plan:  Discussed general issues with the diabetes mellitus diagnosis and management  We discussed the [id number] test which reflects previous overall glucose levels or control  Reviewed the importance of blood glucose (BG) testing to assess glucose trends  Provided general overview of diabetes (oral and injectable) medication use    Recommended:  Continue current MF, glimeparide, and Levemir insulin dose plan  When current supply of the (expensive) Levemir gone, change to Relion NPH vial/syringe use   Discussed use of NPH insulin, pre-dose mixing process   Plan to use nearby ([place] [facility] pharmacy, gave her paper Rx today  Need more glucose trend information   Test BG QD vs BID, goal target  mg/dl   Discussed the hyperglycemia side effect of Prednisone use   After changing to the NPH insulin use, message me with BG trend update for dosing advice   Consider wearing LibrePro CGMS sensor with CDE evaluation  Continue atorvastatin 40 mg po QD dose plan  No labs ordered today  Keep focus on diet, exercise, and weight " management  Advise having fasting lipid panel testing and dilated eye examination, at least annually    Anahi to follow up with Primary Care provider regarding elevated blood pressure.  Addressed patient questions today    Labs ordered today:   No orders of the defined types were placed in this encounter.    Radiology/Consults ordered today: None    More than 50% of the time spent with Ms. Gramajo on counseling / coordinating her care.  Total appointment time was 25 minutes.    Follow-up:  3 mo.    Marco Goldstein MD  Endocrinology  [facility]

## 2019-06-19 ENCOUNTER — TELEPHONE (OUTPATIENT)
Dept: ENDOCRINOLOGY | Facility: CLINIC | Age: 74
End: 2019-06-19

## 2019-06-19 DIAGNOSIS — Z79.4 TYPE 2 DIABETES MELLITUS WITHOUT COMPLICATION, WITH LONG-TERM CURRENT USE OF INSULIN (H): Primary | ICD-10-CM

## 2019-06-19 DIAGNOSIS — E11.9 TYPE 2 DIABETES MELLITUS WITHOUT COMPLICATION, WITH LONG-TERM CURRENT USE OF INSULIN (H): Primary | ICD-10-CM

## 2019-06-19 NOTE — TELEPHONE ENCOUNTER
"Pt blood sugar this morning 48 at 7:45 am  Took dose of insulin at 7:50 am (50 units)  Denies confusion, shakiness, or lightheadedness  Pt waking up sweating and weak the past week  This morning feeling clammy and sweaty  Ran out of test strips about 2 weeks ago - received more yesterday  Took BS last night 106 before dinner (pt takes 52 units at night)  Pt reports Dr. Goldstein put pt on new insulin \"cloudy stuff\" (NPH)  No longer on Levemir    Pt has had banana, 6-7 triscuits, and juice   Pt rechecked blood sugar on the phone- 78 now     Pt would rather go back on Levemir, she doesn't mind paying for it.    She feels its the new medication causing the problem. This writer mentioned there may be a lack of education on this medication and her blood sugars. Recommended diabetic educator- pt is indifferent on this.  Advised to eat a bowl of cereal or oatmeal and recheck blood sugar at lunch.    Please advise    Thank you,  Colin ASKEW RN  "

## 2019-06-19 NOTE — TELEPHONE ENCOUNTER
Reason for call:  Patient reporting a symptom    Symptom or request: Lot of sweating and weakness blood sugar this morning was 48     Duration (how long have symptoms been present) For about a week     Have you been treated for this before? No    Additional comments: Pt wants to know if she is able to get in for an appointment.     Phone Number patient can be reached at:  Home number on file [phone] (home)    Best Time:  Anytime     Can we leave a detailed message on this number:  YES    Call taken on 6/19/2019 at 8:16 AM by Poonam Kenyon  4

## 2019-06-20 NOTE — TELEPHONE ENCOUNTER
Discussed with pt   Agreeable to this   Number given to schedule with diabetic educator     Inez Green, RN on 6/20/2019 at 9:00 AM

## 2019-06-20 NOTE — TELEPHONE ENCOUNTER
"Message noted... Detailed nursing review of problem appreciated.    I advise staying on the \"cloudy\" NPH insulin (previously 50U QAM and 54U QPM) but lower the doses by approx 10% amount... To 46U QAM and 50U QPM.  I also advise an appt with the  Diabetes Educator to review the (cheaper) NPH insulin dosing and dose adjustment.  I placed the  Diab Education Referral and the patient may call scheduling office at [phone] to make an appointment.  I would not change to back to the Levemir now... Since more expensive for her.    Please relay message/plan, thanks.    ELIJAH Goldstein MD  Endocrinology   [facility]        "

## 2019-06-28 ENCOUNTER — OFFICE VISIT (OUTPATIENT)
Dept: ENDOCRINOLOGY | Facility: CLINIC | Age: 74
End: 2019-06-28
Payer: MEDICARE

## 2019-06-28 VITALS
DIASTOLIC BLOOD PRESSURE: 82 MMHG | WEIGHT: 242 LBS | BODY MASS INDEX: 44.53 KG/M2 | SYSTOLIC BLOOD PRESSURE: 152 MMHG | HEIGHT: 62 IN | HEART RATE: 91 BPM

## 2019-06-28 DIAGNOSIS — E66.01 MORBID OBESITY (H): ICD-10-CM

## 2019-06-28 DIAGNOSIS — E11.9 TYPE 2 DIABETES MELLITUS WITHOUT COMPLICATION, WITH LONG-TERM CURRENT USE OF INSULIN (H): Primary | ICD-10-CM

## 2019-06-28 DIAGNOSIS — E78.5 HYPERLIPIDEMIA LDL GOAL <100: ICD-10-CM

## 2019-06-28 DIAGNOSIS — Z79.4 TYPE 2 DIABETES MELLITUS WITHOUT COMPLICATION, WITH LONG-TERM CURRENT USE OF INSULIN (H): Primary | ICD-10-CM

## 2019-06-28 LAB
ANION GAP SERPL CALCULATED.3IONS-SCNC: 11 MMOL/L (ref 3–14)
BUN SERPL-MCNC: 17 MG/DL (ref 7–30)
CALCIUM SERPL-MCNC: 9.5 MG/DL (ref 8.5–10.1)
CHLORIDE SERPL-SCNC: 106 MMOL/L (ref 94–109)
CHOLEST SERPL-MCNC: 137 MG/DL
CO2 SERPL-SCNC: 24 MMOL/L (ref 20–32)
CREAT SERPL-MCNC: 0.94 MG/DL (ref 0.52–1.04)
GFR SERPL CREATININE-BSD FRML MDRD: 61 ML/MIN/{1.73_M2}
GLUCOSE SERPL-MCNC: 79 MG/DL (ref 70–99)
HBA1C MFR BLD: 7.1 % (ref 0–5.6)
HDLC SERPL-MCNC: 41 MG/DL
LDLC SERPL CALC-MCNC: 66 MG/DL
NONHDLC SERPL-MCNC: 96 MG/DL
POTASSIUM SERPL-SCNC: 4.3 MMOL/L (ref 3.4–5.3)
SODIUM SERPL-SCNC: 141 MMOL/L (ref 133–144)
TRIGL SERPL-MCNC: 150 MG/DL
TSH SERPL DL<=0.005 MIU/L-ACNC: 2.15 MU/L (ref 0.4–4)

## 2019-06-28 PROCEDURE — 36415 COLL VENOUS BLD VENIPUNCTURE: CPT | Performed by: INTERNAL MEDICINE

## 2019-06-28 PROCEDURE — 99214 OFFICE O/P EST MOD 30 MIN: CPT | Performed by: INTERNAL MEDICINE

## 2019-06-28 PROCEDURE — 83036 HEMOGLOBIN GLYCOSYLATED A1C: CPT | Performed by: INTERNAL MEDICINE

## 2019-06-28 PROCEDURE — 84443 ASSAY THYROID STIM HORMONE: CPT | Performed by: INTERNAL MEDICINE

## 2019-06-28 PROCEDURE — 80048 BASIC METABOLIC PNL TOTAL CA: CPT | Performed by: INTERNAL MEDICINE

## 2019-06-28 PROCEDURE — 82043 UR ALBUMIN QUANTITATIVE: CPT | Performed by: INTERNAL MEDICINE

## 2019-06-28 PROCEDURE — 80061 LIPID PANEL: CPT | Performed by: INTERNAL MEDICINE

## 2019-06-28 ASSESSMENT — MIFFLIN-ST. JEOR: SCORE: 1560.95

## 2019-06-28 NOTE — PROGRESS NOTES
Name: Anahi Gramajo    Chief Complaint   Patient presents with     Diabetes     HPI:  Recent issues:  Here for f/u diabetes evaluation   Patient concerns about lower glucose levels, swelling, weight gain... Since change to NPH insulin  Has made lower adjustments with her NPH dosing recent days  No recent BG trend or symptom updates from patient, however        Previous history of T2DM, diagnosed ~age 62  Recalls taking oral DM meds, including metformin  Had seen Dr. AMITA Flood/Herb, also Dr. RAMSES Daniel  ~2012 Began use of daily insulin  5/2019. Changed insulin from Levemir to Relion NPH   Initial dosing NPH 50U BID  Current DM meds:    Metformin 1000 mg BID   glimeparide 2 mg as 1-QAM and 2-QPM   Relion NPH ~40U QAM and 50U QPM    Uses Accucheck Guide meter, typically tests QAM   Reviewed BG meter trends,  mg/dL   Perceives higher afternoon BG levels while on Prednisone    Previous [facility] labs include:      Recent [facility] labs include:  Lab Results   Component Value Date    A1C 7.1 (H) 06/28/2019     08/14/2018    POTASSIUM 4.1 08/14/2018    CHLORIDE 106 08/14/2018    CO2 27 08/14/2018    ANIONGAP 7 08/14/2018    GLC 75 08/14/2018    BUN 16 08/14/2018    CR 0.70 08/14/2018    GFRESTIMATED 82 08/14/2018    GFRESTBLACK >90 08/14/2018    LESTER 8.9 08/14/2018    CHOL 129 04/17/2018    TRIG 154 (H) 04/17/2018    HDL 41 (L) 04/17/2018    LDL 57 04/17/2018    NHDL 88 04/17/2018    UCRR 152 04/17/2018    MICROL 20 04/17/2018    UMALCR 12.96 04/17/2018     Recent [facility] labs include:        [facility]  DM complications:  None known      Lives in [facility]  She sees Dr. Keila Stevens/[facility] for gen med evaluations    PMH/PSH:  Past Medical History:   Diagnosis Date     Asthma      Depression      Hyperlipidemia      Obesity      Type 2 diabetes mellitus (H)      No past surgical history on file.    Family Hx:  No family history on file.      Social Hx:  Social History      Socioeconomic History     Marital status:      Spouse name: Not on file     Number of children: Not on file     Years of education: Not on file     Highest education level: Not on file   Occupational History     Not on file   Social Needs     Financial resource strain: Not on file     Food insecurity:     Worry: Not on file     Inability: Not on file     Transportation needs:     Medical: Not on file     Non-medical: Not on file   Tobacco Use     Smoking status: Former Smoker     Smokeless tobacco: Never Used   Substance and Sexual Activity     Alcohol use: No     Drug use: No     Sexual activity: Never     Partners: Male   Lifestyle     Physical activity:     Days per week: Not on file     Minutes per session: Not on file     Stress: Not on file   Relationships     Social connections:     Talks on phone: Not on file     Gets together: Not on file     Attends Mosque service: Not on file     Active member of club or organization: Not on file     Attends meetings of clubs or organizations: Not on file     Relationship status: Not on file     Intimate partner violence:     Fear of current or ex partner: Not on file     Emotionally abused: Not on file     Physically abused: Not on file     Forced sexual activity: Not on file   Other Topics Concern     Not on file   Social History Narrative     Not on file          MEDICATIONS:  has a current medication list which includes the following prescription(s): albuterol, alprazolam, aspirin, atorvastatin, blood glucose, budesonide-formoterol, vitamin d3, vitamin b-12, glimepiride, ibuprofen, insulin nph, metformin, multivitamin, omeprazole, insulin syringe-needle u-100, and levemir vial.    ROS:     ROS: 10 point ROS neg other than the symptoms noted above in the HPI.    GENERAL: mild fatigue, wt stable; fevers, chills, malaise, night sweats.   HEENT: no dysphagia, odonophagia, diplopia, neck pain or tenderness  THYROID:  no apparent hyper or hypothyroid  "symptoms  CV: no chest pain, pressure, palpitations, skipped beats  LUNGS: recent wheezing; no SOB, PEREZ, cough  ABDOMEN: no diarrhea, constipation, abdominal pain  EXTREMITIES: no rashes, ulcers, edema  NEUROLOGY: no changes in vision, tingling or numbness in hands or feet.   MSK: pains at knees, low back, and left shoulder; denies muscle weakness  SKIN: no rashes or lesions  GYN: no menses; no hot flashes or night sweats  ENDOCRINE: no heat or cold intolerance    Physical Exam   VS: /82   Pulse 91   Ht 1.575 m (5' 2\")   Wt 109.8 kg (242 lb)   BMI 44.26 kg/m    GENERAL: AXOX3, NAD, well dressed, answering questions appropriately, appears stated age.  THYROID:  normal gland, no apparent nodules or goiter  ABDOMEN: obese, soft, nontender, nondistended    LABS:    All pertinent notes, labs, and images personally reviewed by me.     A/P:  Encounter Diagnoses   Name Primary?     Type 2 diabetes mellitus without complication, with long-term current use of insulin (H) Yes     Hyperlipidemia LDL goal <100      Morbid obesity (H)      Comments:  Reviewed health history and overall diabetes issues.    Plan:  Discussed general issues with the diabetes mellitus diagnosis and management  We discussed the bxhnrwvzwxS2v test which reflects previous overall glucose levels or control  Reviewed the importance of blood glucose (BG) testing to assess glucose trends  Provided general overview of diabetes (oral and injectable) medication use    Recommended:  Continue current MF, glimeparide, and Levemir insulin dose plan  Change NPH dosing as N30 QAM and N40 QAM  Test BG QD vs BID, goal target  mg/dl  Consider wearing CorporamaePro CGMS sensor with CDE evaluation  Check fasting labs today  Keep focus on diet, exercise, and weight management  Continue atorvastatin 40 mg po QD dose plan  Advise having fasting lipid panel testing and dilated eye examination, at least annually  Agree with her plan for diabetes education appt next " arturo Christian to follow up with Primary Care provider regarding elevated blood pressure.  Addressed patient questions today    Labs ordered today:   Orders Placed This Encounter   Procedures     Hemoglobin A1c     Basic metabolic panel     TSH     Albumin Random Urine Quantitative with Creat Ratio     Lipid panel reflex to direct LDL Fasting     Radiology/Consults ordered today: None    More than 50% of the time spent with Ms. Gramajo on counseling / coordinating her care.  Total appointment time was 25 minutes.    Follow-up:  6 mo.    Marco Goldstein MD  Endocrinology  [place] [facility]

## 2019-06-29 LAB
CREAT UR-MCNC: 449 MG/DL
MICROALBUMIN UR-MCNC: 292 MG/L
MICROALBUMIN/CREAT UR: 65.03 MG/G CR (ref 0–25)

## 2019-07-03 ENCOUNTER — ALLIED HEALTH/NURSE VISIT (OUTPATIENT)
Dept: EDUCATION SERVICES | Facility: CLINIC | Age: 74
End: 2019-07-03
Payer: MEDICARE

## 2019-07-03 DIAGNOSIS — E11.9 TYPE 2 DIABETES MELLITUS WITHOUT COMPLICATION, WITH LONG-TERM CURRENT USE OF INSULIN (H): Primary | ICD-10-CM

## 2019-07-03 DIAGNOSIS — Z79.4 TYPE 2 DIABETES MELLITUS WITHOUT COMPLICATION, WITH LONG-TERM CURRENT USE OF INSULIN (H): Primary | ICD-10-CM

## 2019-07-03 PROCEDURE — G0108 DIAB MANAGE TRN  PER INDIV: HCPCS

## 2019-07-03 NOTE — PROGRESS NOTES
"  Diabetes Self-Management Education & Support    Diabetes Education Self Management & Training    SUBJECTIVE/OBJECTIVE:  Presents for: Individual review  Accompanied by: Self  Diabetes education in the past 24mo: No  Focus of Visit: Healthy Eating, Monitoring  Diabetes type: Type 2  How confident are you filling out medical forms by yourself:: Not Assessed  Diabetes management related comments/concerns: Wondering what her goals for blood sugars are.   Other concerns:: None  Cultural Influences/Ethnic Background:  American    Diabetes Symptoms & Complications  Weight trend: Stable       Patient Problem List and Family Medical History reviewed for relevant medical history, current medical status, and diabetes risk factors.    Vitals:  There were no vitals taken for this visit.  Estimated body mass index is 44.26 kg/m  as calculated from the following:    Height as of 6/28/19: 1.575 m (5' 2\").    Weight as of 6/28/19: 109.8 kg (242 lb).   Last 3 BP:   BP Readings from Last 3 Encounters:   06/28/19 152/82   03/25/19 151/73   08/14/18 144/78       History   Smoking Status     Former Smoker   Smokeless Tobacco     Never Used       Labs:  Lab Results   Component Value Date    A1C 7.1 06/28/2019     Lab Results   Component Value Date    GLC 79 06/28/2019     Lab Results   Component Value Date    LDL 66 06/28/2019     HDL Cholesterol   Date Value Ref Range Status   06/28/2019 41 (L) >49 mg/dL Final   ]  GFR Estimate   Date Value Ref Range Status   06/28/2019 61 >60 mL/min/[1.73_m2] Final     Comment:     Non  GFR Calc  Starting 12/18/2018, serum creatinine based estimated GFR (eGFR) will be   calculated using the Chronic Kidney Disease Epidemiology Collaboration   (CKD-EPI) equation.       GFR Estimate If Black   Date Value Ref Range Status   06/28/2019 70 >60 mL/min/[1.73_m2] Final     Comment:      GFR Calc  Starting 12/18/2018, serum creatinine based estimated GFR (eGFR) will be "   calculated using the Chronic Kidney Disease Epidemiology Collaboration   (CKD-EPI) equation.       Lab Results   Component Value Date    CR 0.94 2019     No results found for: MICROALBUMIN    Healthy Eating  Healthy Eating Assessed Today: Yes  Cultural/Christian diet restrictions?: No  Meals include: Breakfast, Lunch, Dinner, Snacks  Breakfast: banana and cheerios and 1% milk  Lunch: sandwich and crackers maybe (triscuits or wheat thins)  Dinner: burger OR chicken OR stouffers frozen meal   Snacks: sweet tooth in the afternoon and will eat whatever is around and might have popcorn after dinner or slice of cheese or chex mix  Beverages: Milk, Coffee, Water, Diet soda    Being Active  Being Active Assessed Today: Yes  Exercise:: Currently not exercising  Barrier to exercise: Physical limitation    Monitoring  Monitoring Assessed Today: Yes  Did patient bring glucose meter to appointment? : Yes  Blood Glucose Meter: Accu-check  Home Glucose (Sugar) Monitorin-2 times per day    Date Breakfast  Lunch  Dinner  Bedtime    Before After Before After Before After    7/3 137          106          99   138       92          157               Taking Medications  Diabetes Medication(s)     Biguanides       metFORMIN (GLUCOPHAGE) 1000 MG tablet    Take 1-tablet by mouth twice per day    Insulin       insulin NPH (HUMULIN N/NOVOLIN N VIAL) 100 UNIT/ML vial    Inject 50- 54 units subcutaneous BID, total daily dose 104 units, Relion insulin     LEVEMIR VIAL 100 UNIT/ML soln    Inject 50 units sq in morning and 54 units sq in evening, as directed     Patient not taking:  Reported on 2019    Sulfonylureas       glimepiride (AMARYL) 2 MG tablet    Take 1 tablet (2 mg) by mouth every morning And 2 tablets in the evening as directed      Not taking levemir anymore.     Taking Medication Assessed Today: Yes  Current Treatments: Insulin Injections  Given by: Patient    Problem Solving  Problem Solving Assessed  Today: Yes  Hypoglycemia Frequency: Weekly  Hypoglycemia Treatment: Juice  Patient carries a carbohydrate source: Yes    Hypoglycemia symptoms  Sweats: Yes  Tremors: Yes      Reducing Risks  Reducing Risks Assessed Today: No    Healthy Coping  Healthy Coping Assessed Today: Yes  Emotional response to diabetes: Ready to learn  Stage of change: PREPARATION (Decided to change - considering how)  Patient Activation Measure Survey Score:  No flowsheet data found.    ASSESSMENT:  No low blood sugars since last visit with endo last week. Most of FBS's are in target the past few days. Discussed option of a selma pro sensor today but since her lows are better and she is feeling better with her insulin routine now we opted to hold off for now. Educated that we can always place one if she is noticing more lows again or erratic BG's.     Focused on diet with diabetes today as she wanted a bit of refresher since it has been a number of years since any education.    Reports she tried calling medicare to see what coverage she has for CDE visits but they said WE have to call to find out.  She provided me with the information and I will attempt to call as able. Typically though medicare covers 2 hours per year of CDE visits.       Patient's most recent   Lab Results   Component Value Date    A1C 7.1 06/28/2019    is not meeting goal of <7.0    INTERVENTION:   Diabetes knowledge and skills assessment:     Patient is knowledgeable in diabetes management concepts related to: Being Active, Monitoring, Taking Medication, Problem Solving, Reducing Risks and Healthy Coping  Patient needs further education on the following diabetes management concepts: Healthy Eating, Being Active, Monitoring   Based on learning assessment above, most appropriate setting for further diabetes education would be:  Individual setting.    Education provided today on:  AADE Self-Care Behaviors:  Healthy Eating: carbohydrate counting, consistency in amount,  composition, and timing of food intake, weight reduction, heart healthy diet, portion control, plate planning method and label reading  Being Active: relationship to blood glucose  Monitoring: log and interpret results and individual blood glucose targets and frequency of monitoring    Opportunities for ongoing education and support in diabetes-self management were discussed.    Pt verbalized understanding of concepts discussed and recommendations provided today.       Education Materials Provided:  [facility] Understanding Diabetes Booklet    PLAN:  See Patient Instructions for co-developed, patient-stated behavior change goals.  Pt to call and schedule f/u for selma placement if she starts having more low BG's again or having erratic BG's.   No insulin changes made today.   AVS printed and provided to patient today. See Follow-Up section for recommended follow-up.    MANDY Juarez CDE    Time Spent: 60 minutes  Encounter Type: Individual    Any diabetes medication dose changes were made via the CDE Protocol and Collaborative Practice Agreement with the patient's endocrinology provider. A copy of this encounter was shared with the provider.

## 2019-07-03 NOTE — Clinical Note
MISSY, her blood sugars look much better the past few days. No lows. She was skeptical of the selma pro sensor and since her numbers looked better we did not place it today. Told her to let us know if her she starts having lows again and we can certainly put it on. Kristin Lemos, MANDY LD CDE

## 2019-07-03 NOTE — PATIENT INSTRUCTIONS
Blood sugar goals:  Before meals  mg/dL  After meals less than 180 mg/dL  Before bed 100-150 mg/dL    Check blood sugar 4 times daily (before meals and bedtime).      Bring blood glucose meter and logbook with you to all doctor and follow-up appointments.    Diabetes Education Telephone Visit Follow-up:    We realize your time is valuable and your health is important! We offer a convenient Telephone Visit follow up! It s a quick way to check in for a medication dose adjustment without having to come back to clinic as soon.    Telephone Visits are often covered by insurance. Please check with your insurance plan to see if this type of visit is covered. If not, the cost is less expensive than an office visit:      Up to 10 minutes (Code 85151): $30    11-20 minutes (Code 42284): $59    More than 20 minutes (Code 93663): $85    Talk with your Diabetes Educator if you want to learn more.      [facility]:  For Your Diabetes Education and Nutrition Appointments Call:  [phone]   For Diabetes Education or Nutrition Related Questions:   Phone: [phone]  E-mail: [email or web address]  Fax: [phone]   If you need a medication refill please contact your pharmacy. Please allow 3 business days for your refills to be completed.

## 2019-07-08 NOTE — PROGRESS NOTES
Called pt's medicare number that she provided me (provider line). However, I do not have a medicare provider number so I was not able to access her eligibility for coverage. Medicare typically covers 2 hours of follow up diabetes education yearly however.     Kristin Lemos, MANDY LD CDE

## 2019-08-05 DIAGNOSIS — Z79.4 TYPE 2 DIABETES MELLITUS WITHOUT COMPLICATION, WITH LONG-TERM CURRENT USE OF INSULIN (H): ICD-10-CM

## 2019-08-05 DIAGNOSIS — E78.5 HYPERLIPIDEMIA LDL GOAL <100: ICD-10-CM

## 2019-08-05 DIAGNOSIS — E11.9 TYPE 2 DIABETES MELLITUS WITHOUT COMPLICATION, WITH LONG-TERM CURRENT USE OF INSULIN (H): ICD-10-CM

## 2019-08-05 NOTE — TELEPHONE ENCOUNTER
"metFORMIN (GLUCOPHAGE) 1000 MG tablet 180 tablet 0 5/6/2019     Last Written Prescription Date:  5/6/2019  Last Fill Quantity: 180,  # refills: 0     glimepiride (AMARYL) 2 MG tablet 270 tablet 0 5/6/2019     Last Written Prescription Date:  5/6/2019  Last Fill Quantity: 270,  # refills: 0     atorvastatin (LIPITOR) 40 MG tablet 90 tablet 0 5/6/2019  No   Sig - Route: Take 1 tablet (40 mg) by mouth daily - Oral     Last Written Prescription Date:  5/6/2019  Last Fill Quantity: 90,  # refills: 0   Last office visit: 6/28/2019 with prescribing provider: MARSHALL Goldstein   Future Office Visit:   Next 5 appointments (look out 90 days)    Oct 28, 2019 10:30 AM CDT  Return Visit with Marco Goldstein MD  [facility] [address] [id number] [phone]         Requested Prescriptions   Pending Prescriptions Disp Refills     atorvastatin (LIPITOR) 40 MG tablet 90 tablet      Sig: Take 1 tablet (40 mg) by mouth daily       Statins Protocol Passed - 8/5/2019  1:06 PM        Passed - LDL on file in past 12 months     Recent Labs   Lab Test 06/28/19  1406   LDL 66             Passed - No abnormal creatine kinase in past 12 months     No lab results found.             Passed - Recent (12 mo) or future (30 days) visit within the authorizing provider's specialty     Patient had office visit in the last 12 months or has a visit in the next 30 days with authorizing provider or within the authorizing provider's specialty.  See \"Patient Info\" tab in inbasket, or \"Choose Columns\" in Meds & Orders section of the refill encounter.              Passed - Medication is active on med list        Passed - Patient is age 18 or older        Passed - No active pregnancy on record        Passed - No positive pregnancy test in past 12 months        glimepiride (AMARYL) 2 MG tablet 270 tablet      Sig: Take 1 tablet (2 mg) by mouth every morning And 2 tablets in the evening as " "directed       Sulfonylurea Agents Failed - 8/5/2019  1:06 PM        Failed - Blood pressure less than 140/90 in past 6 months     BP Readings from Last 3 Encounters:   06/28/19 152/82   03/25/19 151/73   08/14/18 144/78                 Passed - Patient has documented LDL within the past 12 mos.     Recent Labs   Lab Test 06/28/19  1406   LDL 66             Passed - Patient has had a Microalbumin in the past 15 mos.     Recent Labs   Lab Test 06/28/19  1411   MICROL 292   UMALCR 65.03*             Passed - Patient has documented A1c within the specified period of time.     If HgbA1C is 8 or greater, it needs to be on file within the past 3 months.  If less than 8, must be on file within the past 6 months.     Recent Labs   Lab Test 06/28/19  1406   A1C 7.1*             Passed - Medication is active on med list        Passed - Patient is age 18 or older        Passed - No active pregnancy on record        Passed - Patient has a recent creatinine (normal) within the past 12 mos.     Recent Labs   Lab Test 06/28/19  1406   CR 0.94             Passed - Patient has not had a positive pregnancy test within the past 12 mos.        Passed - Recent (6 mo) or future (30 days) visit within the authorizing provider's specialty     Patient had office visit in the last 6 months or has a visit in the next 30 days with authorizing provider or within the authorizing provider's specialty.  See \"Patient Info\" tab in inbasket, or \"Choose Columns\" in Meds & Orders section of the refill encounter.            metFORMIN (GLUCOPHAGE) 1000 MG tablet 180 tablet      Sig: Take 1-tablet by mouth twice per day       Biguanide Agents Failed - 8/5/2019  1:06 PM        Failed - Blood pressure less than 140/90 in past 6 months     BP Readings from Last 3 Encounters:   06/28/19 152/82   03/25/19 151/73   08/14/18 144/78                 Passed - Patient has documented LDL within the past 12 mos.     Recent Labs   Lab Test 06/28/19  1406   LDL 66      " "       Passed - Patient has had a Microalbumin in the past 15 mos.     Recent Labs   Lab Test 06/28/19  1411   MICROL 292   UMALCR 65.03*             Passed - Patient is age 10 or older        Passed - Patient has documented A1c within the specified period of time.     If HgbA1C is 8 or greater, it needs to be on file within the past 3 months.  If less than 8, must be on file within the past 6 months.     Recent Labs   Lab Test 06/28/19  1406   A1C 7.1*             Passed - Patient's CR is NOT>1.4 OR Patient's EGFR is NOT<45 within past 12 mos.     Recent Labs   Lab Test 06/28/19  1406   GFRESTIMATED 61   GFRESTBLACK 70       Recent Labs   Lab Test 06/28/19  1406   CR 0.94             Passed - Patient does NOT have a diagnosis of CHF.        Passed - Medication is active on med list        Passed - Patient is not pregnant        Passed - Patient has not had a positive pregnancy test within the past 12 mos.         Passed - Recent (6 mo) or future (30 days) visit within the authorizing provider's specialty     Patient had office visit in the last 6 months or has a visit in the next 30 days with authorizing provider or within the authorizing provider's specialty.  See \"Patient Info\" tab in inbasket, or \"Choose Columns\" in Meds & Orders section of the refill encounter.              "

## 2019-08-06 RX ORDER — ATORVASTATIN CALCIUM 40 MG/1
40 TABLET, FILM COATED ORAL DAILY
Qty: 90 TABLET | Refills: 1 | Status: SHIPPED | OUTPATIENT
Start: 2019-08-06 | End: 2020-02-03

## 2019-08-06 RX ORDER — GLIMEPIRIDE 2 MG/1
2 TABLET ORAL EVERY MORNING
Qty: 270 TABLET | Refills: 1 | Status: SHIPPED | OUTPATIENT
Start: 2019-08-06 | End: 2020-02-03

## 2019-08-06 NOTE — TELEPHONE ENCOUNTER
Routing refill request to provider for review/approval because:  Labs - lipids not complete  BPs out of range.  Please authorize if appropriate.  Thanks,  Lina Lopez RN      Added in pharm comments:  Due for apt in Dec

## 2019-11-15 ENCOUNTER — TRANSFERRED RECORDS (OUTPATIENT)
Dept: HEALTH INFORMATION MANAGEMENT | Facility: CLINIC | Age: 74
End: 2019-11-15

## 2019-12-16 ENCOUNTER — HEALTH MAINTENANCE LETTER (OUTPATIENT)
Age: 74
End: 2019-12-16

## 2020-01-31 ENCOUNTER — OFFICE VISIT (OUTPATIENT)
Dept: ENDOCRINOLOGY | Facility: CLINIC | Age: 75
End: 2020-01-31
Payer: MEDICARE

## 2020-01-31 VITALS
DIASTOLIC BLOOD PRESSURE: 80 MMHG | SYSTOLIC BLOOD PRESSURE: 164 MMHG | BODY MASS INDEX: 44.16 KG/M2 | HEART RATE: 87 BPM | WEIGHT: 240 LBS | HEIGHT: 62 IN

## 2020-01-31 DIAGNOSIS — R09.89 LABILE BLOOD PRESSURE: ICD-10-CM

## 2020-01-31 DIAGNOSIS — E66.01 MORBID OBESITY (H): ICD-10-CM

## 2020-01-31 DIAGNOSIS — Z79.4 TYPE 2 DIABETES MELLITUS WITHOUT COMPLICATION, WITH LONG-TERM CURRENT USE OF INSULIN (H): Primary | ICD-10-CM

## 2020-01-31 DIAGNOSIS — E11.9 TYPE 2 DIABETES MELLITUS WITHOUT COMPLICATION, WITH LONG-TERM CURRENT USE OF INSULIN (H): Primary | ICD-10-CM

## 2020-01-31 LAB — HBA1C MFR BLD: 7.1 % (ref 0–5.6)

## 2020-01-31 PROCEDURE — 84443 ASSAY THYROID STIM HORMONE: CPT | Performed by: INTERNAL MEDICINE

## 2020-01-31 PROCEDURE — 36415 COLL VENOUS BLD VENIPUNCTURE: CPT | Performed by: INTERNAL MEDICINE

## 2020-01-31 PROCEDURE — 99214 OFFICE O/P EST MOD 30 MIN: CPT | Performed by: INTERNAL MEDICINE

## 2020-01-31 PROCEDURE — 83036 HEMOGLOBIN GLYCOSYLATED A1C: CPT | Performed by: INTERNAL MEDICINE

## 2020-01-31 PROCEDURE — 84460 ALANINE AMINO (ALT) (SGPT): CPT | Performed by: INTERNAL MEDICINE

## 2020-01-31 PROCEDURE — 80048 BASIC METABOLIC PNL TOTAL CA: CPT | Performed by: INTERNAL MEDICINE

## 2020-01-31 ASSESSMENT — MIFFLIN-ST. JEOR: SCORE: 1551.88

## 2020-01-31 NOTE — PROGRESS NOTES
Name: Anahi Gramajo    Chief Complaint   Patient presents with     Diabetes     HPI:  Recent issues:  Here for f/u diabetes evaluation   She stopped the morning NPH insulin med use due to concerns for low morning BG levels  Recent BG meter issues... no recent BG trend or symptom updates from patient, however        Previous history of T2DM, diagnosed ~age 62  Recalls taking oral DM meds, including metformin  Had seen Dr. AMITA Flood/Herb, also Dr. RAMSES Daniel  ~2012 Began use of daily insulin  5/2019. Changed insulin from Levemir to Relion NPH   Initial dosing NPH 50U BID  Fall 2019. Patient concerned with low FBG, decided to stop the morning NPH insulin use  Current DM meds:   Metformin 1000 mg  1-tab morning and evening  glimeparide 2 mg  1-tab morning and 2-tabs evening  Relion NPH   Subcutaneous 40U in evening    Uses Accucheck Guide meter   Some difficulty with meter use, no recent BG data   Recalls mornings , evenings infrequently- pp 240 mg/dl    Recent HP labs include:        Previous FV labs include:      Recent FV labs include:  Lab Results   Component Value Date    A1C 7.1 (H) 01/31/2020     06/28/2019    POTASSIUM 4.3 06/28/2019    CHLORIDE 106 06/28/2019    CO2 24 06/28/2019    ANIONGAP 11 06/28/2019    GLC 79 06/28/2019    BUN 17 06/28/2019    CR 0.94 06/28/2019    GFRESTIMATED 61 06/28/2019    GFRESTBLACK 70 06/28/2019    LESTER 9.5 06/28/2019    CHOL 137 06/28/2019    TRIG 150 (H) 06/28/2019    HDL 41 (L) 06/28/2019    LDL 66 06/28/2019    NHDL 96 06/28/2019    UCRR 449 06/28/2019    MICROL 292 06/28/2019    UMALCR 65.03 (H) 06/28/2019     Lab Results   Component Value Date    TSH 2.15 06/28/2019       [facility]  DM complications:  None known      Lives in [facility]  She sees Dr. Keila Stevens/[facility] for gen med evaluations    PMH/PSH:  Past Medical History:   Diagnosis Date     Asthma      Depression      Hyperlipidemia      Labile blood  pressure      Obesity      Type 2 diabetes mellitus (H)      No past surgical history on file.    Family Hx:  No family history on file.      Social Hx:  Social History     Socioeconomic History     Marital status:      Spouse name: Not on file     Number of children: Not on file     Years of education: Not on file     Highest education level: Not on file   Occupational History     Not on file   Social Needs     Financial resource strain: Not on file     Food insecurity:     Worry: Not on file     Inability: Not on file     Transportation needs:     Medical: Not on file     Non-medical: Not on file   Tobacco Use     Smoking status: Former Smoker     Smokeless tobacco: Never Used   Substance and Sexual Activity     Alcohol use: No     Drug use: No     Sexual activity: Never     Partners: Male   Lifestyle     Physical activity:     Days per week: Not on file     Minutes per session: Not on file     Stress: Not on file   Relationships     Social connections:     Talks on phone: Not on file     Gets together: Not on file     Attends [employer] service: Not on file     Active member of club or organization: Not on file     Attends meetings of clubs or organizations: Not on file     Relationship status: Not on file     Intimate partner violence:     Fear of current or ex partner: Not on file     Emotionally abused: Not on file     Physically abused: Not on file     Forced sexual activity: Not on file   Other Topics Concern     Not on file   Social History Narrative     Not on file          MEDICATIONS:  has a current medication list which includes the following prescription(s): albuterol, alprazolam, aspirin, atorvastatin, budesonide-formoterol, vitamin d3, vitamin b-12, glimepiride, ibuprofen, insulin nph, metformin, multivitamin, omeprazole, blood glucose, and insulin syringe-needle u-100.    ROS:     ROS: 10 point ROS neg other than the symptoms noted above in the HPI.    GENERAL: mild fatigue, wt stable; fevers,  "chills, malaise, night sweats.   HEENT: no dysphagia, odonophagia, diplopia, neck pain or tenderness  THYROID:  no apparent hyper or hypothyroid symptoms  CV:  Denies chest pain, pressure or palpitations  LUNGS:  Denies wheezing, cough, SOB, PEREZ  ABDOMEN: no diarrhea, constipation, abdominal pain  EXTREMITIES: no rashes, ulcers, edema  NEUROLOGY: no changes in vision, tingling or numbness in hands or feet.   MSK: pains at knees, low back, and left shoulder; denies muscle weakness  SKIN: no rashes or lesions  GYN: no menses; no hot flashes or night sweats  ENDOCRINE: no heat or cold intolerance    Physical Exam   VS: BP (!) 164/80   Pulse 87   Ht 1.575 m (5' 2\")   Wt 108.9 kg (240 lb)   BMI 43.90 kg/m    GENERAL: AXOX3, NAD, well dressed, answering questions appropriately, appears stated age.  THYROID:  normal gland, no apparent nodules or goiter  ABDOMEN: obese, soft, nontender, nondistended  EXTREM:  Trace ankle edema    LABS:    All pertinent notes, labs, and images personally reviewed by me.     A/P:  Encounter Diagnoses   Name Primary?     Type 2 diabetes mellitus without complication, with long-term current use of insulin (H) Yes     Morbid obesity (H)      Labile blood pressure      Comments:  Reviewed health history and overall diabetes issues.  She stopped the morning insulin due to concerns about low morning BG, though should have lowered the evening insulin dose  High BP today with probable hypertension... needs further evaluation with PCP    Plan:  Discussed general issues with the diabetes mellitus diagnosis and management  We discussed the [id number] test which reflects previous overall glucose levels or control  Reviewed the importance of blood glucose (BG) testing to assess glucose trends  Provided general overview of diabetes (oral and injectable) medication use    Recommended:  Continue current MF, glimeparide, and NPH insulin, as noted  Test BG QD vs BID, goal target  mg/dl  Check labs " today  Keep focus on diet, exercise, and weight management  Gave her new sample Accu-check GuideMe meter today  Advised repeat Diabetes Education Referral   Wear diagnostic LibrePro sensor   Followup evaluation to review data, insulin dose adjustments  Continue atorvastatin 40 mg po QD dose plan  Advise having fasting lipid panel testing and dilated eye examination, at least annually  Agree with her plan for diabetes education appt next week    Anahi to follow up with Primary Care provider regarding elevated blood pressure.  Addressed patient questions today    Patient Instructions   Diabetes medication plan:  Metformin 1000 mg  1-tab morning and evening  glimeparide 2 mg  1-tab morning and 2-tabs evening  Relion NPH   Subcutaneous 36U in evening    Resume use of (new) Accu-check GuideMe meter   Test before breakfast and suppertime   Goal target premeal 80- 150 mg/dl    Notify Dr. Goldstein with message if high suppertime levels   Report BG trend information   May need to restart morning dose Relion NPH insulin  Recent blood pressure (BP) trends high, 164/80 today   Need repeat BP check with Dr. De La O in early 2/2020   May need to start/add BP medication  Contact Dr. Goldstein's office if questions about diabetes plan  We will arrange a followup Diabetes Educator evaluation soon   Call [phone] to make the Diab Ed appt soon.   Consider wearing the Otilio glucose sensor    Labs ordered today:   Orders Placed This Encounter   Procedures     Hemoglobin A1c     Basic metabolic panel     ALT     TSH     AMBULATORY ADULT DIABETES EDUCATOR REFERRAL     Radiology/Consults ordered today: AMBULATORY ADULT DIABETES EDUCATOR REFERRAL    More than 50% of the time spent with Ms. Gramajo on counseling / coordinating her care.  Total appointment time was 30 minutes.    Follow-up:  4 mo.    DAMION Goldstein MD, MS  Endocrinology  [facility]    CC:  PIPPA Chatterjee

## 2020-01-31 NOTE — PATIENT INSTRUCTIONS
Diabetes medication plan:  Metformin 1000 mg  1-tab morning and evening  glimeparide 2 mg  1-tab morning and 2-tabs evening  Relion NPH   Subcutaneous 36U in evening    Resume use of (new) Accu-check GuideMe meter   Test before breakfast and suppertime   Goal target premeal 80- 150 mg/dl    Notify Dr. Goldstein with message if high suppertime levels   Report BG trend information   May need to restart morning dose Relion NPH insulin  Recent blood pressure (BP) trends high, 164/80 today   Need repeat BP check with Dr. De La O in early 2/2020   May need to start/add BP medication  Contact Dr. Goldstein's office if questions about diabetes plan  We will arrange a followup Diabetes Educator evaluation soon   Call [phone] to make the Diab Ed appt soon.   Consider wearing the Otilio glucose sensor

## 2020-02-01 LAB
ALT SERPL W P-5'-P-CCNC: 25 U/L (ref 0–50)
ANION GAP SERPL CALCULATED.3IONS-SCNC: 7 MMOL/L (ref 3–14)
BUN SERPL-MCNC: 19 MG/DL (ref 7–30)
CALCIUM SERPL-MCNC: 9.3 MG/DL (ref 8.5–10.1)
CHLORIDE SERPL-SCNC: 106 MMOL/L (ref 94–109)
CO2 SERPL-SCNC: 27 MMOL/L (ref 20–32)
CREAT SERPL-MCNC: 0.89 MG/DL (ref 0.52–1.04)
GFR SERPL CREATININE-BSD FRML MDRD: 64 ML/MIN/{1.73_M2}
GLUCOSE SERPL-MCNC: 118 MG/DL (ref 70–99)
POTASSIUM SERPL-SCNC: 4.5 MMOL/L (ref 3.4–5.3)
SODIUM SERPL-SCNC: 140 MMOL/L (ref 133–144)
TSH SERPL DL<=0.005 MIU/L-ACNC: 1.72 MU/L (ref 0.4–4)

## 2020-02-03 ENCOUNTER — TELEPHONE (OUTPATIENT)
Dept: ENDOCRINOLOGY | Facility: CLINIC | Age: 75
End: 2020-02-03

## 2020-02-03 NOTE — TELEPHONE ENCOUNTER
Diabetes Education Scheduling Outreach #1:    Call to patient to schedule. Patient declined to schedule. She is seeing her internist today and needs to discuss another matter with her. She wants to deal with that first and will call us later to schedule.    Inez Baumann OnCall  Diabetes and Nutrition Scheduling

## 2020-03-30 ENCOUNTER — TELEPHONE (OUTPATIENT)
Dept: ENDOCRINOLOGY | Facility: CLINIC | Age: 75
End: 2020-03-30

## 2020-03-30 NOTE — TELEPHONE ENCOUNTER
Reason for Call:  Form, our goal is to have forms completed with 72 hours, however, some forms may require a visit or additional information.    Type of letter, form or note:  medical    Who is the form from?: Patient   [place] [employer] Diabetes Report    Where did the form come from: form was mailed in    What clinic location was the form placed at?: [facility]    Where the form was placed: Providers Mailbox    What number is listed as a contact on the form?:  # [phone]  Fax# [phone]       Additional comments: The patient attached a note and stamped envelope to mail the completed form to [employer]    Call taken on 3/30/2020 at 3:42 PM by Karen Wilkes

## 2020-04-03 NOTE — TELEPHONE ENCOUNTER
Form filled out by Dr. Goldstein and faxed to [place] [employer]. Copy mailed out to patient.    Koby Caldwell CMA on 4/3/2020 at 9:01 AM

## 2020-04-08 DIAGNOSIS — E11.9 TYPE 2 DIABETES MELLITUS WITHOUT COMPLICATION, WITH LONG-TERM CURRENT USE OF INSULIN (H): ICD-10-CM

## 2020-04-08 DIAGNOSIS — Z79.4 TYPE 2 DIABETES MELLITUS WITHOUT COMPLICATION, WITH LONG-TERM CURRENT USE OF INSULIN (H): ICD-10-CM

## 2020-04-08 NOTE — TELEPHONE ENCOUNTER
"Last Written Prescription Date:  3/25/19  Last Fill Quantity: 40 ML,  # refills: 11   Last office visit: 1/31/2020 with prescribing provider:     Future Office Visit:   Next 5 appointments (look out 90 days)    May 26, 2020 11:00 AM CDT  [facility] with [facility] [address] [address] [phone]         Requested Prescriptions   Pending Prescriptions Disp Refills     insulin NPH (NOVOLIN N RELION) 100 UNIT/ML vial [Pharmacy Med Name: NovoLIN N ReliOn 100 UNIT/ML Subcutaneous Suspension] 40 mL 0     Sig: INJECT 50 TO 54 UNITS SUBCUTANEOUSLY TWICE DAILY .  TOTAL  DAILY  DOSE  104  UNITS.       Intermediate Acting Insulin Protocol Passed - 4/8/2020  2:36 PM        Passed - Serum creatinine on file in past 12 months     Recent Labs   Lab Test 01/31/20  1156   CR 0.89       Ok to refill medication if creatinine is low          Passed - HgbA1C in past 3 or 6 months     If HgbA1C is 8 or greater, it needs to be on file within the past 3 months.  If less than 8, must be on file within the past 6 months.     Recent Labs   Lab Test 01/31/20  1156   A1C 7.1*             Passed - Medication is active on med list        Passed - Patient is age 18 or older        Passed - Recent (6 mo) or future (30 days) visit within the authorizing provider's specialty     Patient had office visit in the last 6 months or has a visit in the next 30 days with authorizing provider or within the authorizing provider's specialty.  See \"Patient Info\" tab in inbasket, or \"Choose Columns\" in Meds & Orders section of the refill encounter.                 "

## 2020-04-09 RX ORDER — HUMAN INSULIN 100 [IU]/ML
INJECTION, SUSPENSION SUBCUTANEOUS
Qty: 40 ML | Refills: 0 | Status: SHIPPED | OUTPATIENT
Start: 2020-04-09 | End: 2020-07-29

## 2020-04-09 NOTE — TELEPHONE ENCOUNTER
Prescription approved per [facility] Refill Protocol.  Lina Lopez RN

## 2020-04-10 DIAGNOSIS — Z79.4 TYPE 2 DIABETES MELLITUS WITHOUT COMPLICATION, WITH LONG-TERM CURRENT USE OF INSULIN (H): ICD-10-CM

## 2020-04-10 DIAGNOSIS — E11.9 TYPE 2 DIABETES MELLITUS WITHOUT COMPLICATION, WITH LONG-TERM CURRENT USE OF INSULIN (H): ICD-10-CM

## 2020-04-10 RX ORDER — IBUPROFEN 200 MG
TABLET ORAL
Qty: 200 EACH | Refills: 0 | Status: SHIPPED | OUTPATIENT
Start: 2020-04-10

## 2020-04-10 NOTE — TELEPHONE ENCOUNTER
"Pending Prescriptions:                       Disp   Refills    insulin syringe-needle U-100 (SAFETY INSU*100 ea*11           Sig: Use for sq insulin injections BID    Last Written Prescription Date:  01/26/2018  Last Fill Quantity: 100,  # refills: 11   Last office visit: 1/31/2020 with prescribing provider:     Future Office Visit:   Next 5 appointments (look out 90 days)    May 26, 2020 11:00 AM CDT  Return Visit with Marco Goldstein MD  [facility] ([address] [id number] [phone]         Requested Prescriptions   Pending Prescriptions Disp Refills     insulin syringe-needle U-100 (SAFETY INSULIN SYRINGES) 29G X 1/2\" 1 ML miscellaneous 100 each 11     Sig: Use for sq insulin injections BID       Diabetic Supplies Protocol Passed - 4/10/2020  2:17 PM        Passed - Medication is active on med list        Passed - Patient is 18 years of age or older        Passed - Recent (6 mo) or future (30 days) visit within the authorizing provider's specialty     Patient had office visit in the last 6 months or has a visit in the next 30 days with authorizing provider.  See \"Patient Info\" tab in inbasket, or \"Choose Columns\" in Meds & Orders section of the refill encounter.                 "

## 2020-04-10 NOTE — TELEPHONE ENCOUNTER
Prescription approved per [facility] Refill Protocol.  Brandie HERNÁNDEZ RN

## 2020-05-26 ENCOUNTER — VIRTUAL VISIT (OUTPATIENT)
Dept: ENDOCRINOLOGY | Facility: CLINIC | Age: 75
End: 2020-05-26
Payer: MEDICARE

## 2020-05-26 DIAGNOSIS — E11.29 TYPE 2 DIABETES MELLITUS WITH MICROALBUMINURIA, WITH LONG-TERM CURRENT USE OF INSULIN (H): Primary | ICD-10-CM

## 2020-05-26 DIAGNOSIS — R80.9 TYPE 2 DIABETES MELLITUS WITH MICROALBUMINURIA, WITH LONG-TERM CURRENT USE OF INSULIN (H): Primary | ICD-10-CM

## 2020-05-26 DIAGNOSIS — Z79.4 TYPE 2 DIABETES MELLITUS WITH MICROALBUMINURIA, WITH LONG-TERM CURRENT USE OF INSULIN (H): Primary | ICD-10-CM

## 2020-05-26 DIAGNOSIS — E78.5 HYPERLIPIDEMIA LDL GOAL <100: ICD-10-CM

## 2020-05-26 PROCEDURE — 99442: CPT | Performed by: INTERNAL MEDICINE

## 2020-05-26 NOTE — PROGRESS NOTES
"Anahi Gramajo is a 75 year old female who is being evaluated via a billable telephone visit.      The patient has been notified of following:     \"This telephone visit will be conducted via a call between you and your physician/provider. We have found that certain health care needs can be provided without the need for a physical exam.  This service lets us provide the care you need with a short phone conversation.  If a prescription is necessary we can send it directly to your pharmacy.  If lab work is needed we can place an order for that and you can then stop by our lab to have the test done at a later time.    Telephone visits are billed at different rates depending on your insurance coverage. During this emergency period, for some insurers they may be billed the same as an in-person visit.  Please reach out to your insurance provider with any questions.    If during the course of the call the physician/provider feels a telephone visit is not appropriate, you will not be charged for this service.\"    Patient has given verbal consent for Telephone visit?  Yes    What phone number would you like to be contacted at? [phone]    How would you like to obtain your AVS? Reviewed verbally      Recent issues:  Diabetes followup  Reports good FBG levels overall  She did not start the lisinopril BP med prescribed by Dr. Rodney Martin, due to potential cough SE risk           Previous history of T2DM, diagnosed ~age 62  Recalls taking oral DM meds, including metformin  Had seen Dr. AMITA Flood/Herb, also Dr. RAMSES Daniel  ~2012 Began use of daily insulin  5/2019. Changed insulin from Levemir to Relion NPH              Initial dosing NPH 50U BID  Fall 2019. Patient concerned with low FBG, decided to stop the morning NPH insulin use  Current DM meds:   Metformin 1000 mg     1-tab morning and evening  glimeparide 2 mg        1-tab morning and 2-tabs evening  Relion NPH                 Subcutaneous 40U in evening     Uses " Accucheck Guide meter   FBG today 81   Other FBG 72, 76, 66,150, 97     Recent HP labs include:         Previous FV labs include:       Recent FV labs include:  Lab Results   Component Value Date    A1C 7.1 (H) 01/31/2020     01/31/2020    POTASSIUM 4.5 01/31/2020    CHLORIDE 106 01/31/2020    CO2 27 01/31/2020    ANIONGAP 7 01/31/2020     (H) 01/31/2020    BUN 19 01/31/2020    CR 0.89 01/31/2020    GFRESTIMATED 64 01/31/2020    GFRESTBLACK 74 01/31/2020    LESTER 9.3 01/31/2020    CHOL 137 06/28/2019    TRIG 150 (H) 06/28/2019    HDL 41 (L) 06/28/2019    LDL 66 06/28/2019    NHDL 96 06/28/2019    UCRR 449 06/28/2019    MICROL 292 06/28/2019    UMALCR 65.03 (H) 06/28/2019     Last [facility] exam ~6[facility]  DM complications:     Nephropathy:    Microalbuminuria        Lives in [facility]  She sees Dr. Keila Stevens/[facility] for gen med evaluations      ROS: 10 point ROS neg other than the symptoms noted above in the HPI.     GENERAL: mild fatigue, wt stable; fevers, chills, malaise, night sweats.   HEENT: no dysphagia, odonophagia, diplopia, neck pain or tenderness  THYROID:  no apparent hyper or hypothyroid symptoms  CV:  Denies chest pain, pressure or palpitations  LUNGS:  Denies wheezing, cough, SOB, PEREZ  ABDOMEN: no diarrhea, constipation, abdominal pain  EXTREMITIES: no rashes, ulcers, edema  NEUROLOGY: no changes in vision, tingling or numbness in hands or feet.   MSK: pains at knees, low back, and left shoulder; denies muscle weakness  SKIN: no rashes or lesions  GYN: no menses; no hot flashes or night sweats  ENDOCRINE: no heat or cold intolerance        LABS:     All pertinent notes, labs, and images personally reviewed by me.      A/P:       Encounter Diagnoses   Name      Type 2 diabetes mellitus with microalbuminuria, with long-term current use of insulin (H)      Morbid obesity (H)      Comments:  Reviewed health history and overall diabetes issues.  She stopped the  morning insulin due to concerns about low morning BG, though should have lowered the evening insulin dose  High BP today with probable hypertension... needs further evaluation with PCP     Plan:  Discussed general issues with the diabetes mellitus diagnosis and management  We discussed the [id number] test which reflects previous overall glucose levels or control  Reviewed the importance of blood glucose (BG) testing to assess glucose trends  Provided general overview of diabetes (oral and injectable) medication use     Recommended:  Continue current MF, NPH insulin but lower the glimeparide as:  Metformin 1000 mg     1-tab morning and evening  glimeparide 2 mg        1-tab morning and evening  Relion NPH                 Subcutaneous 40U in evening    Test BG FBG, goal target  mg/dl  No labs ordered at this time  Keep focus on diet, exercise, and weight management  Discussed her decision to not start the lisinopril medication prescribed by her PCP   Reviewed health risks of having untreated hypertension   Stressed importance of starting the lisinopril and if she develops a cough SE, then change to losartan (ARB) med    Acquire (large or XL) home BP cuff, talk with pharmacist about options  Continue atorvastatin 40 mg po QD dose plan  Advise having fasting lipid panel testing and dilated eye examination, at least annually  F/u diabetes evaluation with me 8/17/20 at 11am     Anahi to follow up with Primary Care provider regarding elevated blood pressure.  Addressed patient questions today      Total time of call between patient and provider was 16 minutes     This telephone visit chart note is the equivalent of a 79733 office visit billing code      DAMION Goldstein MD, MS  Endocrinology  [facility]

## 2020-08-17 ENCOUNTER — VIRTUAL VISIT (OUTPATIENT)
Dept: ENDOCRINOLOGY | Facility: CLINIC | Age: 75
End: 2020-08-17
Payer: MEDICARE

## 2020-08-17 DIAGNOSIS — Z79.4 TYPE 2 DIABETES MELLITUS WITH MICROALBUMINURIA, WITH LONG-TERM CURRENT USE OF INSULIN (H): Primary | ICD-10-CM

## 2020-08-17 DIAGNOSIS — E11.29 TYPE 2 DIABETES MELLITUS WITH MICROALBUMINURIA, WITH LONG-TERM CURRENT USE OF INSULIN (H): Primary | ICD-10-CM

## 2020-08-17 DIAGNOSIS — R80.9 TYPE 2 DIABETES MELLITUS WITH MICROALBUMINURIA, WITH LONG-TERM CURRENT USE OF INSULIN (H): Primary | ICD-10-CM

## 2020-08-17 DIAGNOSIS — E78.5 HYPERLIPIDEMIA LDL GOAL <100: ICD-10-CM

## 2020-08-17 PROCEDURE — 99442 ZZC PHYSICIAN TELEPHONE EVALUATION 11-20 MIN: CPT | Performed by: INTERNAL MEDICINE

## 2020-08-17 RX ORDER — LISINOPRIL 10 MG/1
TABLET ORAL
COMMUNITY
Start: 2020-06-10

## 2020-08-17 NOTE — LETTER
"    8/17/2020         RE: Anahi Gramajo  [address]  [facility] [address]        Dear Colleague,    Thank you for referring your patient, Anahi Gramajo, to the [facility]. Please see a copy of my visit note below.    Anahi Gramajo is a 75 year old female who is being evaluated via a billable telephone visit.      The patient has been notified of following:     \"This telephone visit will be conducted via a call between you and your physician/provider. We have found that certain health care needs can be provided without the need for a physical exam.  This service lets us provide the care you need with a short phone conversation.  If a prescription is necessary we can send it directly to your pharmacy.  If lab work is needed we can place an order for that and you can then stop by our lab to have the test done at a later time.    Telephone visits are billed at different rates depending on your insurance coverage. During this emergency period, for some insurers they may be billed the same as an in-person visit.  Please reach out to your insurance provider with any questions.    If during the course of the call the physician/provider feels a telephone visit is not appropriate, you will not be charged for this service.\"    Patient has given verbal consent for Telephone visit?  Yes    What phone number would you like to be contacted at? [phone]    How would you like to obtain your AVS? Reviewed verbally      Recent issues:  Diabetes followup  Reports good FBG levels overall  Now taking the lisinopril medication regularly, recent home B           Previous history of T2DM, diagnosed ~age 62  Recalls taking oral DM meds, including metformin  Had seen Dr. AMITA Flood/Herb, also Dr. RAMSES Daniel  ~2012 Began use of daily insulin  5/2019. Changed insulin from Levemir to Relion NPH              Initial dosing NPH 50U BID  Fall 2019. Patient concerned with low FBG, decided to stop the morning NPH " insulin use  Current DM meds:   Metformin 1000 mg     1-tab morning and evening  glimeparide 2 mg        1-tab morning and evening  Relion NPH                 Subcutaneous 40U in evening     Uses Accucheck Guide meter              FBG 7-d avg 96 mg/dl, range ~ mg/dl     Recent HP labs include:         Previous FV labs include:       Recent FV labs include:  Lab Results   Component Value Date    A1C 7.1 (H) 01/31/2020     01/31/2020    POTASSIUM 4.5 01/31/2020    CHLORIDE 106 01/31/2020    CO2 27 01/31/2020    ANIONGAP 7 01/31/2020     (H) 01/31/2020    BUN 19 01/31/2020    CR 0.89 01/31/2020    GFRESTIMATED 64 01/31/2020    GFRESTBLACK 74 01/31/2020    LESTER 9.3 01/31/2020    CHOL 137 06/28/2019    TRIG 150 (H) 06/28/2019    HDL 41 (L) 06/28/2019    LDL 66 06/28/2019    NHDL 96 06/28/2019    UCRR 449 06/28/2019    MICROL 292 06/28/2019    UMALCR 65.03 (H) 06/28/2019     [facility]  DM complications:                Nephropathy:                          Microalbuminuria        L[facility] in [facility]  She sees Dr. Keila [facility] for gen med evaluations        ROS: 10 point ROS neg other than the symptoms noted above in the HPI.     GENERAL: mild fatigue, wt stable; fevers, chills, malaise, night sweats.   HEENT: no dysphagia, odonophagia, diplopia, neck pain or tenderness  THYROID:  no apparent hyper or hypothyroid symptoms  CV:  Denies chest pain, pressure or palpitations  LUNGS:  Denies wheezing, cough, SOB, PEREZ  ABDOMEN: no diarrhea, constipation, abdominal pain  EXTREMITIES: no rashes, ulcers, edema  NEUROLOGY: no changes in vision, tingling or numbness in hands or feet.   MSK: pains at knees, low back, and left shoulder; denies muscle weakness  SKIN: no rashes or lesions  GYN: no menses; no hot flashes or night sweats  ENDOCRINE: no heat or cold intolerance        LABS:     All pertinent notes, labs, and images personally reviewed by me.       A/P:          Encounter Diagnoses   Name       Type 2 diabetes mellitus with microalbuminuria, with long-term current use of insulin (H)       Morbid obesity (H)        Comments:  Reviewed health history and overall diabetes issues.  She stopped the morning insulin due to concerns about low morning BG, though should have lowered the evening insulin dose  High BP today with probable hypertension... needs further evaluation with PCP     Plan:  Discussed general issues with the diabetes mellitus diagnosis and management  We discussed the [id number] test which reflects previous overall glucose levels or control  Reviewed the importance of blood glucose (BG) testing to assess glucose trends  Provided general overview of diabetes (oral and injectable) medication use     Recommended:  Continue current MF, NPH insulin but lower the glimeparide as:  Metformin 1000 mg     1-tab morning and evening  glimeparide 2 mg        1-tab morning and evening  Relion NPH                 Subcutaneous 40U in evening     Test BG FBG, goal target  mg/dl  Plan repeat fasting lab tests in next few months   Discussed testing at a [facility] or having her PCP order at [facility]   Lab orders placed in her FV chart  Keep focus on diet, exercise, and weight management  Recheck home BP level more often, then provide feedback to her PCP  Continue atorvastatin 40 mg po QD dose plan  Advise having fasting lipid panel testing and dilated eye examination, at least annually  F/u diabetes evaluation with me...     Addressed patient questions today        Total time of call between patient and provider was 15 minutes      This telephone visit chart note is the equivalent of a 37732 office visit billing code        DAMION Goldstein MD, MS  Endocrinology  [facility]      Again, thank you for allowing me to participate in the care of your patient.        Sincerely,        Marco Goldstein MD

## 2020-08-17 NOTE — PROGRESS NOTES
"Anahi Gramajo is a 75 year old female who is being evaluated via a billable telephone visit.      The patient has been notified of following:     \"This telephone visit will be conducted via a call between you and your physician/provider. We have found that certain health care needs can be provided without the need for a physical exam.  This service lets us provide the care you need with a short phone conversation.  If a prescription is necessary we can send it directly to your pharmacy.  If lab work is needed we can place an order for that and you can then stop by our lab to have the test done at a later time.    Telephone visits are billed at different rates depending on your insurance coverage. During this emergency period, for some insurers they may be billed the same as an in-person visit.  Please reach out to your insurance provider with any questions.    If during the course of the call the physician/provider feels a telephone visit is not appropriate, you will not be charged for this service.\"    Patient has given verbal consent for Telephone visit?  Yes    What phone number would you like to be contacted at? [phone]    How would you like to obtain your AVS? Reviewed verbally      Recent issues:  Diabetes followup  Reports good FBG levels overall  Now taking the lisinopril medication regularly, recent home BP levels improved per patient           Previous history of T2DM, diagnosed ~age 62  Recalls taking oral DM meds, including metformin  Had seen Dr. AMITA Flood/Herb, also Dr. RAMSES Daniel  ~2012 Began use of daily insulin  5/2019. Changed insulin from Levemir to Relion NPH              Initial dosing NPH 50U BID  Fall 2019. Patient concerned with low FBG, decided to stop the morning NPH insulin use  Current DM meds:   Metformin 1000 mg     1-tab morning and evening  glimeparide 2 mg        1-tab morning and evening  Relion NPH                 Subcutaneous 40U in evening     Uses Accucheck Guide meter     "          FBG 7-d avg 96 mg/dl, range ~ mg/dl     Recent HP labs include:         Previous FV labs include:       Recent FV labs include:  Lab Results   Component Value Date    A1C 7.1 (H) 01/31/2020     01/31/2020    POTASSIUM 4.5 01/31/2020    CHLORIDE 106 01/31/2020    CO2 27 01/31/2020    ANIONGAP 7 01/31/2020     (H) 01/31/2020    BUN 19 01/31/2020    CR 0.89 01/31/2020    GFRESTIMATED 64 01/31/2020    GFRESTBLACK 74 01/31/2020    LESTER 9.3 01/31/2020    CHOL 137 06/28/2019    TRIG 150 (H) 06/28/2019    HDL 41 (L) 06/28/2019    LDL 66 06/28/2019    NHDL 96 06/28/2019    UCRR 449 06/28/2019    MICROL 292 06/28/2019    UMALCR 65.03 (H) 06/28/2019     Last [facility] exam [facility]  DM complications:                Nephropathy:                          Microalbuminuria        Lives in [facility]  She sees Dr. Keila Stevens/[facility] for gen med evaluations        ROS: 10 point ROS neg other than the symptoms noted above in the HPI.     GENERAL: mild fatigue, wt stable; fevers, chills, malaise, night sweats.   HEENT: no dysphagia, odonophagia, diplopia, neck pain or tenderness  THYROID:  no apparent hyper or hypothyroid symptoms  CV:  Denies chest pain, pressure or palpitations  LUNGS:  Denies wheezing, cough, SOB, PEREZ  ABDOMEN: no diarrhea, constipation, abdominal pain  EXTREMITIES: no rashes, ulcers, edema  NEUROLOGY: no changes in vision, tingling or numbness in hands or feet.   MSK: pains at knees, low back, and left shoulder; denies muscle weakness  SKIN: no rashes or lesions  GYN: no menses; no hot flashes or night sweats  ENDOCRINE: no heat or cold intolerance        LABS:     All pertinent notes, labs, and images personally reviewed by me.      A/P:          Encounter Diagnoses   Name       Type 2 diabetes mellitus with microalbuminuria, with long-term current use of insulin (H)       Morbid obesity (H)        Comments:  Reviewed health history and overall diabetes  issues.  She stopped the morning insulin due to concerns about low morning BG, though should have lowered the evening insulin dose  High BP today with probable hypertension... needs further evaluation with PCP     Plan:  Discussed general issues with the diabetes mellitus diagnosis and management  We discussed the myaqrhfxtkR1s test which reflects previous overall glucose levels or control  Reviewed the importance of blood glucose (BG) testing to assess glucose trends  Provided general overview of diabetes (oral and injectable) medication use     Recommended:  Continue current MF, NPH insulin but lower the glimeparide as:  Metformin 1000 mg     1-tab morning and evening  glimeparide 2 mg        1-tab morning and evening  Relion NPH                 Subcutaneous 40U in evening     Test BG FBG, goal target  mg/dl  Plan repeat fasting lab tests in next few months   Discussed testing at a [facility] or having her [facility] order at [facility]   Lab orders placed in her F[facility] chart  Keep focus on diet, exercise, and weight management  Recheck home BP level more often, then provide feedback to her PCP  Continue atorvastatin 40 mg po QD dose plan  Advise having fasting lipid panel testing and dilated eye examination, at least annually  F/u diabetes evaluation with me...     Addressed patient questions today        Total time of call between patient and provider was 15 minutes      This telephone visit chart note is the equivalent of a 02927 office visit billing code        DAMION Goldstein MD, MS  Endocrinology  [facility]

## 2020-09-02 DIAGNOSIS — Z79.4 TYPE 2 DIABETES MELLITUS WITHOUT COMPLICATION, WITH LONG-TERM CURRENT USE OF INSULIN (H): ICD-10-CM

## 2020-09-02 DIAGNOSIS — E78.5 HYPERLIPIDEMIA LDL GOAL <100: ICD-10-CM

## 2020-09-02 DIAGNOSIS — E11.9 TYPE 2 DIABETES MELLITUS WITHOUT COMPLICATION, WITH LONG-TERM CURRENT USE OF INSULIN (H): ICD-10-CM

## 2020-09-03 RX ORDER — GLIMEPIRIDE 2 MG/1
TABLET ORAL
Qty: 90 TABLET | Refills: 0 | Status: SHIPPED | OUTPATIENT
Start: 2020-09-03 | End: 2021-01-28

## 2020-09-03 RX ORDER — ATORVASTATIN CALCIUM 40 MG/1
TABLET, FILM COATED ORAL
Qty: 30 TABLET | Refills: 0 | Status: SHIPPED | OUTPATIENT
Start: 2020-09-03 | End: 2020-09-05

## 2020-09-03 NOTE — TELEPHONE ENCOUNTER
Medication is being filled for 1 time refill only due to:  up to date on appt, needs labs though   Brandie HERNÁNDEZ RN

## 2020-11-01 DIAGNOSIS — Z79.4 TYPE 2 DIABETES MELLITUS WITHOUT COMPLICATION, WITH LONG-TERM CURRENT USE OF INSULIN (H): ICD-10-CM

## 2020-11-01 DIAGNOSIS — E11.9 TYPE 2 DIABETES MELLITUS WITHOUT COMPLICATION, WITH LONG-TERM CURRENT USE OF INSULIN (H): ICD-10-CM

## 2020-11-03 RX ORDER — HUMAN INSULIN 100 [IU]/ML
INJECTION, SUSPENSION SUBCUTANEOUS
Qty: 40 ML | Refills: 0 | Status: SHIPPED | OUTPATIENT
Start: 2020-11-03 | End: 2021-02-11

## 2020-11-03 NOTE — TELEPHONE ENCOUNTER
Prescription approved per [facility] Refill Protocol.  Lianna Melgoza RN on 11/3/2020 at 11:18 AM

## 2021-01-15 ENCOUNTER — HEALTH MAINTENANCE LETTER (OUTPATIENT)
Age: 76
End: 2021-01-15

## 2021-01-26 ENCOUNTER — TELEPHONE (OUTPATIENT)
Dept: ENDOCRINOLOGY | Facility: CLINIC | Age: 76
End: 2021-01-26

## 2021-01-26 DIAGNOSIS — E11.9 TYPE 2 DIABETES MELLITUS WITHOUT COMPLICATION, WITH LONG-TERM CURRENT USE OF INSULIN (H): ICD-10-CM

## 2021-01-26 DIAGNOSIS — Z79.4 TYPE 2 DIABETES MELLITUS WITHOUT COMPLICATION, WITH LONG-TERM CURRENT USE OF INSULIN (H): ICD-10-CM

## 2021-01-27 DIAGNOSIS — E11.9 TYPE 2 DIABETES MELLITUS WITHOUT COMPLICATION, WITH LONG-TERM CURRENT USE OF INSULIN (H): ICD-10-CM

## 2021-01-27 DIAGNOSIS — Z79.4 TYPE 2 DIABETES MELLITUS WITHOUT COMPLICATION, WITH LONG-TERM CURRENT USE OF INSULIN (H): ICD-10-CM

## 2021-01-27 RX ORDER — BLOOD SUGAR DIAGNOSTIC
STRIP MISCELLANEOUS
Qty: 200 STRIP | Refills: 3 | Status: CANCELLED | OUTPATIENT
Start: 2021-01-27

## 2021-01-27 RX ORDER — GLIMEPIRIDE 2 MG/1
TABLET ORAL
Qty: 90 TABLET | Refills: 0 | Status: CANCELLED | OUTPATIENT
Start: 2021-01-27

## 2021-01-27 NOTE — TELEPHONE ENCOUNTER
Medication Question or Refill    Who is calling: pt    What medication are you calling about (include dose and sig)?:  glimepiride (AMARYL) 2 MG tablet  metFORMIN (GLUCOPHAGE) 1000 MG tablet  blood glucose (ACCU-CHEK GUIDE) test strip    Controlled Substance Agreement on file: No    Who prescribed the medication?: pcp    Do you need a refill? Yes:     When did you use the medication last? today    Patient offered an appointment? Yes:     Do you have any questions or concerns?  No    Requested Pharmacy: in chart    Okay to leave a detailed message?: Yes at Home number on file [phone] (home)

## 2021-01-28 RX ORDER — BLOOD SUGAR DIAGNOSTIC
STRIP MISCELLANEOUS
Qty: 200 STRIP | Refills: 3 | Status: SHIPPED | OUTPATIENT
Start: 2021-01-28

## 2021-01-28 RX ORDER — GLIMEPIRIDE 2 MG/1
2 TABLET ORAL 2 TIMES DAILY
Qty: 180 TABLET | Refills: 0 | Status: SHIPPED | OUTPATIENT
Start: 2021-01-28 | End: 2021-02-19

## 2021-01-28 NOTE — TELEPHONE ENCOUNTER
Called patient.   Confirmed patient taking Rx Glimeparide 2mg BID as directed at last appointment with Dr Goldstein .     Patient requesting refill along with Rx test strips.   Prescription approved per [facility] Refill Protocol.    Patient has Virtual Visit scheduled mid-Feb with Dr Goldstein .     Sona RUIZ RN,BSN

## 2021-01-29 DIAGNOSIS — Z79.4 TYPE 2 DIABETES MELLITUS WITH MICROALBUMINURIA, WITH LONG-TERM CURRENT USE OF INSULIN (H): ICD-10-CM

## 2021-01-29 DIAGNOSIS — E11.29 TYPE 2 DIABETES MELLITUS WITH MICROALBUMINURIA, WITH LONG-TERM CURRENT USE OF INSULIN (H): ICD-10-CM

## 2021-01-29 DIAGNOSIS — R80.9 TYPE 2 DIABETES MELLITUS WITH MICROALBUMINURIA, WITH LONG-TERM CURRENT USE OF INSULIN (H): ICD-10-CM

## 2021-01-29 DIAGNOSIS — E78.5 HYPERLIPIDEMIA LDL GOAL <100: ICD-10-CM

## 2021-01-29 LAB
ANION GAP SERPL CALCULATED.3IONS-SCNC: 7 MMOL/L (ref 3–14)
BUN SERPL-MCNC: 23 MG/DL (ref 7–30)
CALCIUM SERPL-MCNC: 9.3 MG/DL (ref 8.5–10.1)
CHLORIDE SERPL-SCNC: 108 MMOL/L (ref 94–109)
CHOLEST SERPL-MCNC: 167 MG/DL
CO2 SERPL-SCNC: 27 MMOL/L (ref 20–32)
CREAT SERPL-MCNC: 0.94 MG/DL (ref 0.52–1.04)
CREAT UR-MCNC: 188 MG/DL
GFR SERPL CREATININE-BSD FRML MDRD: 60 ML/MIN/{1.73_M2}
GLUCOSE SERPL-MCNC: 106 MG/DL (ref 70–99)
HBA1C MFR BLD: 7.4 % (ref 0–5.6)
HDLC SERPL-MCNC: 46 MG/DL
LDLC SERPL CALC-MCNC: 91 MG/DL
MICROALBUMIN UR-MCNC: 69 MG/L
MICROALBUMIN/CREAT UR: 36.76 MG/G CR (ref 0–25)
NONHDLC SERPL-MCNC: 121 MG/DL
POTASSIUM SERPL-SCNC: 4.5 MMOL/L (ref 3.4–5.3)
SODIUM SERPL-SCNC: 142 MMOL/L (ref 133–144)
TRIGL SERPL-MCNC: 148 MG/DL

## 2021-01-29 PROCEDURE — 80061 LIPID PANEL: CPT | Performed by: INTERNAL MEDICINE

## 2021-01-29 PROCEDURE — 36415 COLL VENOUS BLD VENIPUNCTURE: CPT | Performed by: INTERNAL MEDICINE

## 2021-01-29 PROCEDURE — 80048 BASIC METABOLIC PNL TOTAL CA: CPT | Performed by: INTERNAL MEDICINE

## 2021-01-29 PROCEDURE — 82043 UR ALBUMIN QUANTITATIVE: CPT | Performed by: INTERNAL MEDICINE

## 2021-01-29 PROCEDURE — 83036 HEMOGLOBIN GLYCOSYLATED A1C: CPT | Performed by: INTERNAL MEDICINE

## 2021-02-19 ENCOUNTER — VIRTUAL VISIT (OUTPATIENT)
Dept: ENDOCRINOLOGY | Facility: CLINIC | Age: 76
End: 2021-02-19
Payer: MEDICARE

## 2021-02-19 DIAGNOSIS — Z79.4 TYPE 2 DIABETES MELLITUS WITHOUT COMPLICATION, WITH LONG-TERM CURRENT USE OF INSULIN (H): Primary | ICD-10-CM

## 2021-02-19 DIAGNOSIS — E11.9 TYPE 2 DIABETES MELLITUS WITHOUT COMPLICATION, WITH LONG-TERM CURRENT USE OF INSULIN (H): Primary | ICD-10-CM

## 2021-02-19 PROCEDURE — 99442 PR PHYSICIAN TELEPHONE EVALUATION 11-20 MIN: CPT | Performed by: INTERNAL MEDICINE

## 2021-02-19 RX ORDER — GLIMEPIRIDE 2 MG/1
2 TABLET ORAL 2 TIMES DAILY
Qty: 180 TABLET | Refills: 3 | Status: SHIPPED | OUTPATIENT
Start: 2021-02-19 | End: 2022-04-20

## 2021-02-19 NOTE — PROGRESS NOTES
Anahi is a 75 year old who is being evaluated via a billable telephone visit.      What phone number would you like to be contacted at? [phone]  How would you like to obtain your AVS? Verbally Reviewed      Recent issues:  Diabetes follow-up evaluation  Reports good FBG levels overall           Previous history of T2DM, diagnosed ~age 62  Recalls taking oral DM meds, including metformin  Had seen Dr. AMITA Flood/Herb, also Dr. RAMSES Daniel  ~2012 Began use of daily insulin  5/2019. Changed insulin from Levemir to Relion NPH              Initial dosing NPH 50U BID  Fall 2019. Patient concerned with low FBG, decided to stop the morning NPH insulin use    Current DM meds:   Metformin 1000 mg     1-tab morning and evening  glimeparide 2 mg        1-tab morning and evening  Relion NPH                 Subcutaneous 40U at bedtime     Uses Accucheck Guide meter   FBG ~104-110 mg/dl     Recent HP labs include:         Previous FV labs include:       Recent FV labs include:    Lab Results   Component Value Date    A1C 7.4 (H) 01/29/2021     01/29/2021    POTASSIUM 4.5 01/29/2021    CHLORIDE 108 01/29/2021    CO2 27 01/29/2021    ANIONGAP 7 01/29/2021     (H) 01/29/2021    BUN 23 01/29/2021    CR 0.94 01/29/2021    GFRESTIMATED 60 (L) 01/29/2021    GFRESTBLACK 70 01/29/2021    LESTER 9.3 01/29/2021    CHOL 167 01/29/2021    TRIG 148 01/29/2021    HDL 46 (L) 01/29/2021    LDL 91 01/29/2021    NHDL 121 01/29/2021    UCRR 188 01/29/2021    MICROL 69 01/29/2021    UMALCR 36.76 (H) 01/29/2021     Last [facility] exam ~6[facility]  DM complications:                Nephropathy:                          Microalbuminuria        Lives in [facility]  She sees [facility]. Keila Stevens/[facility] for gen med evaluations        ROS: 10 point ROS neg other than the symptoms noted above in the HPI.     GENERAL: mild fatigue, wt stable; fevers, chills, malaise, night sweats.   HEENT: no dysphagia, odonophagia,  diplopia, neck pain or tenderness  THYROID:  no apparent hyper or hypothyroid symptoms  CV:  Denies chest pain, pressure or palpitations  LUNGS:  Denies wheezing, cough, SOB, PEREZ  ABDOMEN: no diarrhea, constipation, abdominal pain  EXTREMITIES: no rashes, ulcers, edema  NEUROLOGY: no changes in vision, tingling or numbness in hands or feet.   MSK: pains at knees, low back, and left shoulder; denies muscle weakness  SKIN: no rashes or lesions  GYN: no menses; no hot flashes or night sweats  ENDOCRINE: no heat or cold intolerance        LABS:     All pertinent notes, labs, and images personally reviewed by me.      A/P:          Encounter Diagnoses   Name       Type 2 diabetes mellitus with microalbuminuria, with long-term current use of insulin (H)       Morbid obesity (H)        Comments:  Reviewed health history and overall diabetes issues.  She stopped the morning insulin due to concerns about low morning BG, though should have lowered the evening insulin dose  High BP today with probable hypertension... needs further evaluation with PCP     Plan:  Discussed general issues with the diabetes mellitus diagnosis and management  We discussed the fhxpyjwceyB2c test which reflects previous overall glucose levels or control  Reviewed the importance of blood glucose (BG) testing to assess glucose trends  Provided general overview of diabetes (oral and injectable) medication use     Recommended:  Continue current MF, NPH insulin but lower the glimeparide as:  Metformin 1000 mg     1-tab morning and evening  glimeparide 2 mg        1-tab morning and evening  Relion NPH                 Subcutaneous 40U in evening     Test BG FBG, goal target  mg/dl  No lab tests needed at this time  Keep focus on diet, exercise, and weight management  Continue atorvastatin 40 mg po QD dose plan  Advise having fasting lipid panel testing and dilated eye examination, at least annually     Addressed patient questions today    Total time  spent in with the patient evaluation:  10 min  Additional time spent reviewing pertinent lab tests and chart notes, and documentation:  5 min    Follow-up:  8/2021    DAMION Goldstein MD, MS  Endocrinology  [facility]

## 2021-08-09 ENCOUNTER — OFFICE VISIT (OUTPATIENT)
Dept: ENDOCRINOLOGY | Facility: CLINIC | Age: 76
End: 2021-08-09
Payer: MEDICARE

## 2021-08-09 VITALS
WEIGHT: 244 LBS | HEART RATE: 101 BPM | BODY MASS INDEX: 44.63 KG/M2 | SYSTOLIC BLOOD PRESSURE: 132 MMHG | OXYGEN SATURATION: 95 % | DIASTOLIC BLOOD PRESSURE: 80 MMHG

## 2021-08-09 DIAGNOSIS — E11.29 TYPE 2 DIABETES MELLITUS WITH MICROALBUMINURIA, WITH LONG-TERM CURRENT USE OF INSULIN (H): Primary | ICD-10-CM

## 2021-08-09 DIAGNOSIS — Z79.4 TYPE 2 DIABETES MELLITUS WITH MICROALBUMINURIA, WITH LONG-TERM CURRENT USE OF INSULIN (H): Primary | ICD-10-CM

## 2021-08-09 DIAGNOSIS — R80.9 TYPE 2 DIABETES MELLITUS WITH MICROALBUMINURIA, WITH LONG-TERM CURRENT USE OF INSULIN (H): Primary | ICD-10-CM

## 2021-08-09 LAB — HBA1C MFR BLD: 5.4 % (ref 0–5.6)

## 2021-08-09 PROCEDURE — 36415 COLL VENOUS BLD VENIPUNCTURE: CPT | Performed by: INTERNAL MEDICINE

## 2021-08-09 PROCEDURE — 99214 OFFICE O/P EST MOD 30 MIN: CPT | Performed by: INTERNAL MEDICINE

## 2021-08-09 PROCEDURE — 84443 ASSAY THYROID STIM HORMONE: CPT | Performed by: INTERNAL MEDICINE

## 2021-08-09 PROCEDURE — 83036 HEMOGLOBIN GLYCOSYLATED A1C: CPT | Performed by: INTERNAL MEDICINE

## 2021-08-09 PROCEDURE — 82043 UR ALBUMIN QUANTITATIVE: CPT | Performed by: INTERNAL MEDICINE

## 2021-08-09 PROCEDURE — 80048 BASIC METABOLIC PNL TOTAL CA: CPT | Performed by: INTERNAL MEDICINE

## 2021-08-09 PROCEDURE — 80061 LIPID PANEL: CPT | Performed by: INTERNAL MEDICINE

## 2021-08-09 NOTE — PROGRESS NOTES
Recent issues:  Diabetes follow-up evaluation  Reports missing the morning diabetes med doses sometimes  Reports good FBG levels overall           Previous history of T2DM, diagnosed ~age 62  Recalls taking oral DM meds, including metformin  Had seen Dr. AMITA Flood/Herb, also Dr. RAMSES Daniel  ~2012 Began use of daily insulin  5/2019. Changed insulin from Levemir to Relion NPH              Initial dosing NPH 50U BID  Fall 2019. Patient concerned with low FBG, decided to stop the morning NPH insulin use    Current DM meds:   Metformin 1000 mg     1-tab morning and evening  glimeparide 2 mg        1-tab morning and evening  Relion NPH                 Subcutaneous 40U at bedtime     Uses Accucheck Guide meter   FBG 96- 160 mg/dl range   Sometimes notices low BG at about 4am     Recent HP labs include:         Previous FV labs include:       Recent FV labs include:          Lab Results   Component Value Date    A1C 7.4 (H) 01/29/2021     01/29/2021    POTASSIUM 4.5 01/29/2021    CHLORIDE 108 01/29/2021    CO2 27 01/29/2021    ANIONGAP 7 01/29/2021     (H) 01/29/2021    BUN 23 01/29/2021    CR 0.94 01/29/2021    GFRESTIMATED 60 (L) 01/29/2021    GFRESTBLACK 70 01/29/2021    LESTER 9.3 01/29/2021    CHOL 167 01/29/2021    TRIG 148 01/29/2021    HDL 46 (L) 01/29/2021    LDL 91 01/29/2021    NHDL 121 01/29/2021    UCRR 188 01/29/2021    MICROL 69 01/29/2021    UMALCR 36.76 (H) 01/29/2021     [facility] 2019 [facility]  DM complications:                Nephropathy:                          Microalbuminuria        Lives in [facility]  She sees Dr. Keila Stevens/[facility] for gen med evaluations        ROS: 10 point ROS neg other than the symptoms noted above in the HPI.     GENERAL: mild fatigue, wt stable; fevers, chills, malaise, night sweats.   HEENT: no dysphagia, odonophagia, diplopia, neck pain or tenderness  THYROID:  no apparent hyper or hypothyroid symptoms  CV:  Denies chest pain,  pressure or palpitations  LUNGS:  Denies wheezing, cough, SOB, PEREZ  ABDOMEN: no diarrhea, constipation, abdominal pain  EXTREMITIES: no rashes, ulcers, edema  NEUROLOGY: no changes in vision, tingling or numbness in hands or feet.   MSK: pains at knees, low back, and right hip pains denies muscle weakness  SKIN: no rashes or lesions  GYN: no menses; no hot flashes or night sweats  ENDOCRINE: no heat or cold intolerance       Physical Exam   VS: /80   Pulse 101   Wt 110.7 kg (244 lb)   SpO2 95%   BMI 44.63 kg/m    GENERAL: AXOX3, NAD, well dressed, answering questions appropriately, appears stated age.  ENT: no nose swelling or nasal discharge, mouth redness or gum changes.  EYES: eyes grossly normal to inspection, conjunctivae and sclerae normal, no exophthalmos or proptosis  THYROID:  no apparent nodules or goiter  LUNGS: no audible wheeze, cough or visible cyanosis, or increased work of breathing  ABDOMEN: abdomen obese size  EXTREMITIES: no edema noted  NEUROLOGY: CN grossly intact, no tremors  MSK: grossly intact  SKIN:  no apparent skin lesions, rash, or edema with visualized skin appearance  PSYCH: mentation appears normal, affect normal/bright, judgement and insight intact,   normal speech and appearance well groomed     LABS:     All pertinent notes, labs, and images personally reviewed by me.        A/P:  Encounter Diagnosis   Name Primary?     Type 2 diabetes mellitus with microalbuminuria, with long-term current use of insulin (H) Yes       Comments:  Reviewed health history and overall diabetes issues.  Nocturnal low BG history, needs lower Relion NPH insulin  Reviewed and interpreted tests that I previously ordered.   Ordered appropriate tests for the endocrinology disease management.    Management options discussed and implemented after shared medical decision making with the patient.  T2DM problem is chronic-stable    Plan:  Discussed general issues with the diabetes mellitus diagnosis and  management  We discussed the uostrksxvmA1p test which reflects previous overall glucose levels or control  Reviewed the importance of blood glucose (BG) testing to assess glucose trends  Provided general overview of diabetes (oral and injectable) medication use     Recommended:  Continue current MF, NPH insulin but lower the glimeparide as:  Metformin 1000 mg     1-tab morning and evening  glimeparide 2 mg        1-tab morning and evening  Relion NPH                 Subcutaneous 36U in evening     Test BG FBG, goal target  mg/dl  Check fasting labs today   Discussed option of nearby [facility]   Lab orders placed  Keep focus on diet, exercise, and weight management  Continue atorvastatin 40 mg po QD dose plan  Advise having fasting lipid panel testing and dilated eye examination, at least annually     Addressed patient questions today    There are no Patient Instructions on file for this visit.    Future labs ordered today:   Orders Placed This Encounter   Procedures     Albumin Random Urine Quantitative with Creat Ratio     Basic metabolic panel     Hemoglobin A1c     TSH     Lipid panel reflex to direct LDL Fasting     Radiology/Consults ordered today: None    Total time spent in with the patient evaluation:  15 min  Additional time spent reviewing pertinent lab tests and chart notes, and documentation:  5 min    Follow-up:  2/2022    DAMION Goldstein MD, MS  Endocrinology  [facility]

## 2021-08-09 NOTE — LETTER
8/9/2021         RE: Anahi Gramajo  [address]  [address]  [place] [facility] [place] [address]        Dear Colleague,    Thank you for referring your patient, Anahi Gramajo, to the [facility] [place]. Please see a copy of my visit note below.      Recent issues:  Diabetes follow-up evaluation  Reports missing the morning diabetes med doses sometimes  Reports good FBG levels overall           Previous history of T2DM, diagnosed ~age 62  Recalls taking oral DM meds, including metformin  Had seen Dr. AMITA Flood/Herb, also Dr. RAMSES Daniel  ~2012 Began use of daily insulin  5/2019. Changed insulin from Levemir to Relion NPH              Initial dosing NPH 50U BID  Fall 2019. Patient concerned with low FBG, decided to stop the morning NPH insulin use    Current DM meds:   Metformin 1000 mg     1-tab morning and evening  glimeparide 2 mg        1-tab morning and evening  Relion NPH                 Subcutaneous 40U at bedtime     Uses Accucheck Guide meter   FBG 96- 160 mg/dl range   Sometimes notices low BG at about 4am     Recent HP labs include:         Previous FV labs include:       Recent FV labs include:          Lab Results   Component Value Date    A1C 7.4 (H) 01/29/2021     01/29/2021    POTASSIUM 4.5 01/29/2021    CHLORIDE 108 01/29/2021    CO2 27 01/29/2021    ANIONGAP 7 01/29/2021     (H) 01/29/2021    BUN 23 01/29/2021    CR 0.94 01/29/2021    GFRESTIMATED 60 (L) 01/29/2021    GFRESTBLACK 70 01/29/2021    LESTER 9.3 01/29/2021    CHOL 167 01/29/2021    TRIG 148 01/29/2021    HDL 46 (L) 01/29/2021    LDL 91 01/29/2021    NHDL 121 01/29/2021    UCRR 188 01/29/2021    MICROL 69 01/29/2021    UMALCR 36.76 (H) 01/29/2021     [facility]  DM complications:                Nephropathy:                          Microalbuminuria        Lives in [facility]  She sees Dr. Keila Stevens/[facility] for gen med evaluations        ROS: 10 point ROS neg other  than the symptoms noted above in the HPI.     GENERAL: mild fatigue, wt stable; fevers, chills, malaise, night sweats.   HEENT: no dysphagia, odonophagia, diplopia, neck pain or tenderness  THYROID:  no apparent hyper or hypothyroid symptoms  CV:  Denies chest pain, pressure or palpitations  LUNGS:  Denies wheezing, cough, SOB, PEREZ  ABDOMEN: no diarrhea, constipation, abdominal pain  EXTREMITIES: no rashes, ulcers, edema  NEUROLOGY: no changes in vision, tingling or numbness in hands or feet.   MSK: pains at knees, low back, and right hip pains denies muscle weakness  SKIN: no rashes or lesions  GYN: no menses; no hot flashes or night sweats  ENDOCRINE: no heat or cold intolerance       Physical Exam   VS: /80   Pulse 101   Wt 110.7 kg (244 lb)   SpO2 95%   BMI 44.63 kg/m    GENERAL: AXOX3, NAD, well dressed, answering questions appropriately, appears stated age.  ENT: no nose swelling or nasal discharge, mouth redness or gum changes.  EYES: eyes grossly normal to inspection, conjunctivae and sclerae normal, no exophthalmos or proptosis  THYROID:  no apparent nodules or goiter  LUNGS: no audible wheeze, cough or visible cyanosis, or increased work of breathing  ABDOMEN: abdomen obese size  EXTREMITIES: no edema noted  NEUROLOGY: CN grossly intact, no tremors  MSK: grossly intact  SKIN:  no apparent skin lesions, rash, or edema with visualized skin appearance  PSYCH: mentation appears normal, affect normal/bright, judgement and insight intact,   normal speech and appearance well groomed     LABS:     All pertinent notes, labs, and images personally reviewed by me.        A/P:  Encounter Diagnosis   Name Primary?     Type 2 diabetes mellitus with microalbuminuria, with long-term current use of insulin (H) Yes       Comments:  Reviewed health history and overall diabetes issues.  Nocturnal low BG history, needs lower Relion NPH insulin  Reviewed and interpreted tests that I previously ordered.   Ordered  appropriate tests for the endocrinology disease management.    Management options discussed and implemented after shared medical decision making with the patient.  T2DM problem is chronic-stable    Plan:  Discussed general issues with the diabetes mellitus diagnosis and management  We discussed the [id number] test which reflects previous overall glucose levels or control  Reviewed the importance of blood glucose (BG) testing to assess glucose trends  Provided general overview of diabetes (oral and injectable) medication use     Recommended:  Continue current MF, NPH insulin but lower the glimeparide as:  Metformin 1000 mg     1-tab morning and evening  glimeparide 2 mg        1-tab morning and evening  Relion NPH                 Subcutaneous 36U in evening     Test BG FBG, goal target  mg/dl  Check fasting labs today   Discussed option of nearby [facility] clinic   Lab orders placed  Keep focus on diet, exercise, and weight management  Continue atorvastatin 40 mg po QD dose plan  Advise having fasting lipid panel testing and dilated eye examination, at least annually     Addressed patient questions today    There are no Patient Instructions on file for this visit.    Future labs ordered today:   Orders Placed This Encounter   Procedures     Albumin Random Urine Quantitative with Creat Ratio     Basic metabolic panel     Hemoglobin A1c     TSH     Lipid panel reflex to direct LDL Fasting     Radiology/Consults ordered today: None    Total time spent in with the patient evaluation:  15 min  Additional time spent reviewing pertinent lab tests and chart notes, and documentation:  5 min    Follow-up:  2/2022    DAMION Goldstein MD, MS  Endocrinology  [facility]                Again, thank you for allowing me to participate in the care of your patient.        Sincerely,        Marco Goldstein MD

## 2021-08-10 LAB
ANION GAP SERPL CALCULATED.3IONS-SCNC: 4 MMOL/L (ref 3–14)
BUN SERPL-MCNC: 21 MG/DL (ref 7–30)
CALCIUM SERPL-MCNC: 9.9 MG/DL (ref 8.5–10.1)
CHLORIDE BLD-SCNC: 105 MMOL/L (ref 94–109)
CHOLEST SERPL-MCNC: 210 MG/DL
CO2 SERPL-SCNC: 29 MMOL/L (ref 20–32)
CREAT SERPL-MCNC: 1.04 MG/DL (ref 0.52–1.04)
CREAT UR-MCNC: 192 MG/DL
FASTING STATUS PATIENT QL REPORTED: YES
GFR SERPL CREATININE-BSD FRML MDRD: 53 ML/MIN/1.73M2
GLUCOSE BLD-MCNC: 116 MG/DL (ref 70–99)
HDLC SERPL-MCNC: 44 MG/DL
LDLC SERPL CALC-MCNC: 125 MG/DL
MICROALBUMIN UR-MCNC: 28 MG/L
MICROALBUMIN/CREAT UR: 14.58 MG/G CR (ref 0–25)
NONHDLC SERPL-MCNC: 166 MG/DL
POTASSIUM BLD-SCNC: 4.9 MMOL/L (ref 3.4–5.3)
SODIUM SERPL-SCNC: 138 MMOL/L (ref 133–144)
TRIGL SERPL-MCNC: 204 MG/DL
TSH SERPL DL<=0.005 MIU/L-ACNC: 2.14 MU/L (ref 0.4–4)

## 2021-09-04 ENCOUNTER — HEALTH MAINTENANCE LETTER (OUTPATIENT)
Age: 76
End: 2021-09-04

## 2021-10-27 ENCOUNTER — TRANSFERRED RECORDS (OUTPATIENT)
Dept: HEALTH INFORMATION MANAGEMENT | Facility: CLINIC | Age: 76
End: 2021-10-27

## 2021-10-27 LAB — RETINOPATHY: NEGATIVE

## 2021-12-25 ENCOUNTER — HEALTH MAINTENANCE LETTER (OUTPATIENT)
Age: 76
End: 2021-12-25

## 2022-01-03 NOTE — LETTER
"    5/26/2020         RE: Anahi Gramajo  [address]  [place] [address]        Dear Colleague,    Thank you for referring your patient, Anahi Gramajo, to the [facility]. Please see a copy of my visit note below.    Anahi Gramajo is a 75 year old female who is being evaluated via a billable telephone visit.      The patient has been notified of following:     \"This telephone visit will be conducted via a call between you and your physician/provider. We have found that certain health care needs can be provided without the need for a physical exam.  This service lets us provide the care you need with a short phone conversation.  If a prescription is necessary we can send it directly to your pharmacy.  If lab work is needed we can place an order for that and you can then stop by our lab to have the test done at a later time.    Telephone visits are billed at different rates depending on your insurance coverage. During this emergency period, for some insurers they may be billed the same as an in-person visit.  Please reach out to your insurance provider with any questions.    If during the course of the call the physician/provider feels a telephone visit is not appropriate, you will not be charged for this service.\"    Patient has given verbal consent for Telephone visit?  Yes    What phone number would you like to be contacted at? [phone]    How would you like to obtain your AVS? Reviewed verbally      Recent issues:  Diabetes followup  Reports good FBG levels overall  She did not start the lisinopril BP med prescribed by Dr. Rodney Martin, due to potential cough SE risk           Previous history of T2DM, diagnosed ~age 62  Recalls taking oral DM meds, including metformin  Had seen Dr. AMITA Flood/Herb, also Dr. RAMSES Daniel  ~2012 Began use of daily insulin  5/2019. Changed insulin from Levemir to Relion NPH              Initial dosing NPH 50U BID  Fall 2019. Patient concerned with " PEDS SURGERY DAILY PROGRESS NOTE:     SUBJECTIVE/ROS: Patient seen and examined at bedside by surgical team. VV ECMO circuit running; however, clots noted in circuit.     OBJECTIVE:  Vital Signs Last 24 Hrs  T(C): 37.3 (03 Jan 2022 05:00), Max: 37.3 (03 Jan 2022 05:00)  T(F): 99.1 (03 Jan 2022 05:00), Max: 99.1 (03 Jan 2022 05:00)  HR: 112 (03 Jan 2022 07:07) (70 - 149)  BP: --  BP(mean): --  RR: 22 (03 Jan 2022 07:00) (12 - 35)  SpO2: 92% (03 Jan 2022 07:07) (88% - 99%)                        11.3   34.24 )-----------( 136      ( 03 Jan 2022 05:48 )             37.3     01-03    155<H>  |  114<H>  |  62<H>  ----------------------------<  211<H>  3.9   |  30  |  1.60<H>    Ca    9.5      03 Jan 2022 05:48  Phos  5.6     01-02  Mg     2.50     01-02    TPro  6.1  /  Alb  2.9<L>  /  TBili  3.7<H>  /  DBili  x   /  AST  42<H>  /  ALT  96<H>  /  AlkPhos  171  01-03   PT/INR - ( 03 Jan 2022 05:48 )   PT: 23.9 sec;   INR: 2.18 ratio         PTT - ( 03 Jan 2022 05:48 )  PTT:68.1 sec  I&O's Detail    02 Jan 2022 07:01  -  03 Jan 2022 07:00  --------------------------------------------------------  IN:    Bivalirudin: 232.3 mL    Cisatracurium: 237.6 mL    Dexmedetomidine: 467.5 mL    Dexmedetomidine: 154 mL    EPINEPHrine: 23.1 mL    Furosemide: 2.4 mL    Heparin: 72 mL    IV PiggyBack: 500 mL    IV PiggyBack: 26.4 mL    Midazolam: 24 mL    Morphine: 30.8 mL    Morphine: 108.7 mL    Packed Red Cells, Pediatric: 300 mL    sodium chloride 0.45% - Pediatric: 72 mL    sodium chloride 0.9% w/ Additives (ronald): 72 mL    TPN (Total Parenteral Nutrition): 1320 mL  Total IN: 3642.8 mL    OUT:    Blood Draw/Discard (mL): 127 mL    Indwelling Catheter - Urethral (mL): 2255 mL    Nasogastric/Oral tube (mL): 206 mL  Total OUT: 2588 mL    Total NET: 1054.8 mL          IMAGING:      PHYSICAL EXAM:  Gen: Intubated, sedated  Resp: Mechanical ventilation  Abd: Soft, mildly distended, nontender  Cannulation sites: R IJ and R femoral vein sites c/d/i       low FBG, decided to stop the morning NPH insulin use  Current DM meds:   Metformin 1000 mg     1-tab morning and evening  glimeparide 2 mg        1-tab morning and 2-tabs evening  Relion NPH                 Subcutaneous 40U in evening     Uses Accucheck Guide meter   FBG today 81   Other FBG 72, 76, 66,150, 97     Recent HP labs include:         Previous FV labs include:       Recent FV labs include:  Lab Results   Component Value Date    A1C 7.1 (H) 01/31/2020     01/31/2020    POTASSIUM 4.5 01/31/2020    CHLORIDE 106 01/31/2020    CO2 27 01/31/2020    ANIONGAP 7 01/31/2020     (H) 01/31/2020    BUN 19 01/31/2020    CR 0.89 01/31/2020    GFRESTIMATED 64 01/31/2020    GFRESTBLACK 74 01/31/2020    LESTER 9.3 01/31/2020    CHOL 137 06/28/2019    TRIG 150 (H) 06/28/2019    HDL 41 (L) 06/28/2019    LDL 66 06/28/2019    NHDL 96 06/28/2019    UCRR 449 06/28/2019    MICROL 292 06/28/2019    UMALCR 65.03 (H) 06/28/2019     Last [facility] exam ~6[facility]  DM complications:     Nephropathy:    Microalbuminuria        Lives in [facility]  She sees Dr. Keila Stevens/[facility] for gen med evaluations      ROS: 10 point ROS neg other than the symptoms noted above in the HPI.     GENERAL: mild fatigue, wt stable; fevers, chills, malaise, night sweats.   HEENT: no dysphagia, odonophagia, diplopia, neck pain or tenderness  THYROID:  no apparent hyper or hypothyroid symptoms  CV:  Denies chest pain, pressure or palpitations  LUNGS:  Denies wheezing, cough, SOB, PEREZ  ABDOMEN: no diarrhea, constipation, abdominal pain  EXTREMITIES: no rashes, ulcers, edema  NEUROLOGY: no changes in vision, tingling or numbness in hands or feet.   MSK: pains at knees, low back, and left shoulder; denies muscle weakness  SKIN: no rashes or lesions  GYN: no menses; no hot flashes or night sweats  ENDOCRINE: no heat or cold intolerance        LABS:     All pertinent notes, labs, and images personally reviewed by me.       A/P:       Encounter Diagnoses   Name      Type 2 diabetes mellitus with microalbuminuria, with long-term current use of insulin (H)      Morbid obesity (H)      Comments:  Reviewed health history and overall diabetes issues.  She stopped the morning insulin due to concerns about low morning BG, though should have lowered the evening insulin dose  High BP today with probable hypertension... needs further evaluation with PCP     Plan:  Discussed general issues with the diabetes mellitus diagnosis and management  We discussed the [id number] test which reflects previous overall glucose levels or control  Reviewed the importance of blood glucose (BG) testing to assess glucose trends  Provided general overview of diabetes (oral and injectable) medication use     Recommended:  Continue current MF, NPH insulin but lower the glimeparide as:  Metformin 1000 mg     1-tab morning and evening  glimeparide 2 mg        1-tab morning and evening  Relion NPH                 Subcutaneous 40U in evening    Test BG FBG, goal target  mg/dl  No labs ordered at this time  Keep focus on diet, exercise, and weight management  Discussed her decision to not start the lisinopril medication prescribed by her PCP   Reviewed health risks of having untreated hypertension   Stressed importance of starting the lisinopril and if she develops a cough SE, then change to losartan (ARB) med    Acquire (large or XL) home BP cuff, talk with pharmacist about options  Continue atorvastatin 40 mg po QD dose plan  Advise having fasting lipid panel testing and dilated eye examination, at least annually  F/u diabetes evaluation with me 8/17/20 at 11am     Anahi to follow up with Primary Care provider regarding elevated blood pressure.  Addressed patient questions today      Total time of call between patient and provider was 16 minutes     This telephone visit chart note is the equivalent of a 92641 office visit billing code      DAMION Goldstein MD,  MS  Endocrinology  [facility]      Again, thank you for allowing me to participate in the care of your patient.        Sincerely,        Marco Goldstein MD

## 2022-01-21 DIAGNOSIS — E78.5 HYPERLIPIDEMIA LDL GOAL <100: ICD-10-CM

## 2022-01-21 RX ORDER — ATORVASTATIN CALCIUM 40 MG/1
40 TABLET, FILM COATED ORAL DAILY
Qty: 90 TABLET | Refills: 1 | Status: SHIPPED | OUTPATIENT
Start: 2022-01-21 | End: 2022-04-26

## 2022-01-21 NOTE — TELEPHONE ENCOUNTER
[facility] Call Center    Phone Message    May a detailed message be left on voicemail: yes     Reason for Call: Medication Refill Request    Has the patient contacted the pharmacy for the refill? Yes   Name of medication being requested: atorvastatin (LIPITOR) 40 MG tablet  Provider who prescribed the medication: Dr. Goldstein  Pharmacy: [facility] [address] [place] [address]  Date medication is needed: asap         Action Taken: Other: ENDO    Travel Screening: Not Applicable

## 2022-01-21 NOTE — TELEPHONE ENCOUNTER
Last Written Prescription Date:  9/5/2020  Last Fill Quantity: 90,  # refills: 1   Last office visit: 8/9/2021 with prescribing provider:  8/9/2021   Future Office Visit:        Requested Prescriptions   Pending Prescriptions Disp Refills     atorvastatin (LIPITOR) 40 MG tablet 90 tablet 1     Sig: Take 1 tablet (40 mg) by mouth daily       There is no refill protocol information for this order

## 2022-02-19 ENCOUNTER — HEALTH MAINTENANCE LETTER (OUTPATIENT)
Age: 77
End: 2022-02-19

## 2022-02-25 ENCOUNTER — TELEPHONE (OUTPATIENT)
Dept: ENDOCRINOLOGY | Facility: CLINIC | Age: 77
End: 2022-02-25
Payer: MEDICARE

## 2022-02-25 DIAGNOSIS — E11.9 TYPE 2 DIABETES MELLITUS WITHOUT COMPLICATION, WITH LONG-TERM CURRENT USE OF INSULIN (H): Primary | ICD-10-CM

## 2022-02-25 DIAGNOSIS — Z79.4 TYPE 2 DIABETES MELLITUS WITHOUT COMPLICATION, WITH LONG-TERM CURRENT USE OF INSULIN (H): Primary | ICD-10-CM

## 2022-02-25 NOTE — TELEPHONE ENCOUNTER
[facility] Call Center    Phone Message    May a detailed message be left on voicemail: yes     Reason for Call: Order(s): Other:   Reason for requested: Per pt she got a message to schedule a follow up appt and a lab appt prior to her follow up appt. There are currently not active lab orders placed for pt. Please place any lab orders needed, and call pt back. Pt has some questions about why she needs to get labs done prior.  Date needed: asap  Provider name: Angelita      Action Taken: Message routed to:  Other: endocrine    Travel Screening: Not Applicable

## 2022-03-03 DIAGNOSIS — E11.9 TYPE 2 DIABETES MELLITUS WITHOUT COMPLICATION, WITH LONG-TERM CURRENT USE OF INSULIN (H): ICD-10-CM

## 2022-03-03 DIAGNOSIS — Z79.4 TYPE 2 DIABETES MELLITUS WITHOUT COMPLICATION, WITH LONG-TERM CURRENT USE OF INSULIN (H): ICD-10-CM

## 2022-03-04 RX ORDER — HUMAN INSULIN 100 [IU]/ML
INJECTION, SUSPENSION SUBCUTANEOUS
Qty: 40 ML | Refills: 2 | Status: SHIPPED | OUTPATIENT
Start: 2022-03-04 | End: 2022-12-27

## 2022-04-20 DIAGNOSIS — E11.9 TYPE 2 DIABETES MELLITUS WITHOUT COMPLICATION, WITH LONG-TERM CURRENT USE OF INSULIN (H): ICD-10-CM

## 2022-04-20 DIAGNOSIS — Z79.4 TYPE 2 DIABETES MELLITUS WITHOUT COMPLICATION, WITH LONG-TERM CURRENT USE OF INSULIN (H): ICD-10-CM

## 2022-04-20 RX ORDER — GLIMEPIRIDE 2 MG/1
2 TABLET ORAL 2 TIMES DAILY
Qty: 180 TABLET | Refills: 0 | Status: SHIPPED | OUTPATIENT
Start: 2022-04-20 | End: 2022-07-16

## 2022-04-20 NOTE — TELEPHONE ENCOUNTER
Last Written Prescription Date:  2/19/2021  Last Fill Quantity: 180,  # refills: 3   Last office visit: 8/9/2021 with prescribing provider:  8/9/2021   Future Office Visit:      Requested Prescriptions   Pending Prescriptions Disp Refills     glimepiride (AMARYL) 2 MG tablet 180 tablet 3     Sig: Take 1 tablet (2 mg) by mouth 2 times daily       There is no refill protocol information for this order

## 2022-04-26 DIAGNOSIS — E78.5 HYPERLIPIDEMIA LDL GOAL <100: ICD-10-CM

## 2022-04-26 RX ORDER — ATORVASTATIN CALCIUM 40 MG/1
TABLET, FILM COATED ORAL
Qty: 90 TABLET | Refills: 0 | Status: SHIPPED | OUTPATIENT
Start: 2022-04-26 | End: 2022-10-17

## 2022-07-01 DIAGNOSIS — E11.9 TYPE 2 DIABETES MELLITUS WITHOUT COMPLICATION, WITH LONG-TERM CURRENT USE OF INSULIN (H): ICD-10-CM

## 2022-07-01 DIAGNOSIS — Z79.4 TYPE 2 DIABETES MELLITUS WITHOUT COMPLICATION, WITH LONG-TERM CURRENT USE OF INSULIN (H): ICD-10-CM

## 2022-07-01 NOTE — TELEPHONE ENCOUNTER
Last Written Prescription Date:  2/19/2021  Last Fill Quantity: 180,  # refills: 3   Last office visit: 8/9/2021 with prescribing provider:     Future Office Visit:        Requested Prescriptions   Pending Prescriptions Disp Refills     metFORMIN (GLUCOPHAGE) 1000 MG tablet 180 tablet 3     Sig: Take 1 tablet (1,000 mg) by mouth 2 times daily (with meals)       There is no refill protocol information for this order

## 2022-07-05 ENCOUNTER — TELEPHONE (OUTPATIENT)
Dept: ENDOCRINOLOGY | Facility: CLINIC | Age: 77
End: 2022-07-05

## 2022-07-05 NOTE — TELEPHONE ENCOUNTER
[facility] Call Center    Phone Message    May a detailed message be left on voicemail: yes     Reason for Call: Medication Refill Request    Has the patient contacted the pharmacy for the refill? Yes   Name of medication being requested: metformin  Provider who prescribed the medication: Angelita    Pharmacy: [id number] [place], [place] - [address] AT [address]    Date medication is needed: ASAP- pt has been out for four days and isn't sure why this hasn't been resolved as of yet.    Action Taken: Message routed to:  Other: endo    Travel Screening: Not Applicable

## 2022-07-11 ENCOUNTER — OFFICE VISIT (OUTPATIENT)
Dept: ENDOCRINOLOGY | Facility: CLINIC | Age: 77
End: 2022-07-11
Payer: MEDICARE

## 2022-07-11 VITALS
DIASTOLIC BLOOD PRESSURE: 67 MMHG | HEART RATE: 90 BPM | SYSTOLIC BLOOD PRESSURE: 112 MMHG | BODY MASS INDEX: 44.43 KG/M2 | WEIGHT: 242.9 LBS | OXYGEN SATURATION: 96 %

## 2022-07-11 DIAGNOSIS — E11.29 TYPE 2 DIABETES MELLITUS WITH MICROALBUMINURIA, WITH LONG-TERM CURRENT USE OF INSULIN (H): Primary | ICD-10-CM

## 2022-07-11 DIAGNOSIS — Z79.4 TYPE 2 DIABETES MELLITUS WITH MICROALBUMINURIA, WITH LONG-TERM CURRENT USE OF INSULIN (H): Primary | ICD-10-CM

## 2022-07-11 DIAGNOSIS — R80.9 TYPE 2 DIABETES MELLITUS WITH MICROALBUMINURIA, WITH LONG-TERM CURRENT USE OF INSULIN (H): Primary | ICD-10-CM

## 2022-07-11 PROCEDURE — 99214 OFFICE O/P EST MOD 30 MIN: CPT | Performed by: INTERNAL MEDICINE

## 2022-07-11 NOTE — PROGRESS NOTES
Recent issues:  Diabetes follow-up evaluation, last appt 8/9/21  She stopped the morning doses of metformin and glimeparide a few months ago  Reports good FBG levels overall             Previous history of T2DM, diagnosed ~age 62  Recalls taking oral DM meds, including metformin  Had seen Dr. AMITA Flood/Herb, also Dr. RAMSES Daniel  ~2012 Began use of daily insulin  5/2019. Changed insulin from Levemir to Relion NPH              Initial dosing NPH 50U BID  Fall 2019. Patient concerned with low FBG, decided to stop the morning NPH insulin use    Current DM meds:   Metformin 1000 mg     1-tab in evening  glimeparide 2 mg        1-tab in evening  Relion NPH                 Subcutaneous 40U at bedtime     Uses Accucheck Guide meter   FBG  mg/dl       Recent HP labs include:         Previous FV labs include:       Recent FV labs include:          Lab Results   Component Value Date    A1C 5.4 08/09/2021     08/09/2021    POTASSIUM 4.9 08/09/2021    CHLORIDE 105 08/09/2021    CO2 29 08/09/2021    ANIONGAP 4 08/09/2021     (H) 08/09/2021    BUN 21 08/09/2021    CR 1.04 08/09/2021    GFRESTIMATED 53 (L) 08/09/2021    GFRESTBLACK 70 01/29/2021    LESTER 9.9 08/09/2021    CHOL 210 (H) 08/09/2021    TRIG 204 (H) 08/09/2021    HDL 44 (L) 08/09/2021     (H) 08/09/2021    NHDL 166 (H) 08/09/2021    UCRR 192 08/09/2021    MICROL 28 08/09/2021    UMALCR 14.58 08/09/2021     Lab Results   Component Value Date    TSH 2.14 08/09/2021     [facility] DR noted  DM complications:                Nephropathy:                          Microalbuminuria        Lives in [facility]  She sees Dr. Keila Stevens/[facility] for gen med evaluations     PMH/PSH:  Past Medical History:   Diagnosis Date     Asthma      Depression      Hyperlipidemia      Labile blood pressure      Obesity      Type 2 diabetes mellitus (H)      No past surgical history on file.    Family Hx:  No family history  on file.      Social Hx:  Social History     Socioeconomic History     Marital status:      Spouse name: Not on file     Number of children: Not on file     Years of education: Not on file     Highest education level: Not on file   Occupational History     Not on file   Tobacco Use     Smoking status: Former Smoker     Smokeless tobacco: Never Used   Substance and Sexual Activity     Alcohol use: No     Drug use: No     Sexual activity: Never     Partners: Male   Other Topics Concern     Not on file   Social History Narrative     Not on file     Social Determinants of Health     Financial Resource Strain: Not on file   Food Insecurity: Not on file   Transportation Needs: Not on file   Physical Activity: Not on file   Stress: Not on file   Social Connections: Not on file   Intimate Partner Violence: Not on file   Housing Stability: Not on file          MEDICATIONS:  has a current medication list which includes the following prescription(s): albuterol, alprazolam, aspirin, atorvastatin, glimepiride, ibuprofen, novolin n relion, lisinopril, metformin, omeprazole, accu-chek guide, budesonide-formoterol, vitamin d3, insulin syringe-needle u-100, multivitamin, and vitamin b-12.     ROS: 10 point ROS neg other than the symptoms noted above in the HPI.     GENERAL: mild fatigue, wt stable; fevers, chills, malaise, night sweats.   HEENT: no dysphagia, odonophagia, diplopia, neck pain or tenderness  THYROID:  no apparent hyper or hypothyroid symptoms  CV:  Denies chest pain, pressure or palpitations  LUNGS:  Denies wheezing, cough, SOB, PEREZ  ABDOMEN: no diarrhea, constipation, abdominal pain  EXTREMITIES: ankle swelling; no rashes, ulcers  NEUROLOGY: no changes in vision, tingling or numbness in hands or feet.   MSK: pains at knees, low back, and right hip pains denies muscle weakness  SKIN: no rashes or lesions  GYN: no menses; no hot flashes or night sweats  ENDOCRINE: no heat or cold intolerance       Physical Exam    VS: /67   Pulse 90   Wt 110.2 kg (242 lb 14.4 oz)   SpO2 96%   BMI 44.43 kg/m    GENERAL: AXOX3, NAD, well dressed, answering questions appropriately, appears stated age.  ENT: no nose swelling or nasal discharge, mouth redness or gum changes.  EYES: eyes grossly normal to inspection, conjunctivae and sclerae normal, no exophthalmos or proptosis  THYROID:  no apparent nodules or goiter  LUNGS: no audible wheeze, cough or visible cyanosis, or increased work of breathing  ABDOMEN: abdomen obese size  EXTREMITIES: mild ankle swelling, pulses R/L DP 1/2, DP 2/2  NEUROLOGY: CN grossly intact, no tremors  MSK: grossly intact  SKIN:  no apparent skin lesions, rash, or edema with visualized skin appearance  PSYCH: mentation appears normal, affect normal/bright, judgement and insight intact,   normal speech and appearance well groomed     LABS:     All pertinent notes, labs, and images personally reviewed by me.        A/P:  Encounter Diagnosis   Name Primary?     Type 2 diabetes mellitus with microalbuminuria, with long-term current use of insulin (H) Yes       Comments:  Reviewed health history and overall diabetes issues.  Reviewed and interpreted tests that I previously ordered.   Ordered appropriate tests for the endocrinology disease management.    Management options discussed and implemented after shared medical decision making with the patient.  T2DM problem is chronic-stable    Plan:  Discussed general issues with the diabetes mellitus diagnosis and management  We discussed the [id number] test which reflects previous overall glucose levels or control  Reviewed the importance of blood glucose (BG) testing to assess glucose trends  Provided general overview of diabetes (oral and injectable) medication use     Recommended:  Continue current Metformin, glimeparide, and NPH insulin plan  Metformin 1000 mg     1-tab in evening  glimeparide 2 mg        1-tab in evening  Relion  NPH                 Subcutaneous 40U in evening     Would not restart the morning metformin and glimeparide doses at this time  Test BG FBG, goal target  mg/dl  Check fasting labs today   Discussed option of nearby [facility] or her [facility]   Lab orders placed, paper copy given to her today  Keep focus on diet, exercise, and weight management  Continue atorvastatin 40 mg po QD dose plan  Advise having fasting lipid panel testing and dilated eye examination, at least annually  Will summarize lab results when available     See PCP about the ankle swelling, possible vascular testing with duplex U/S and/or BHARATHI procedure  Addressed patient questions today    There are no Patient Instructions on file for this visit.    Future labs ordered today:   Orders Placed This Encounter   Procedures     TSH     Hemoglobin A1c     Basic metabolic panel     Albumin Random Urine Quantitative with Creat Ratio     Lipid panel reflex to direct LDL Fasting     Radiology/Consults ordered today: None    Total time spent in with the patient evaluation:  22 min  Additional time spent reviewing pertinent lab tests and chart notes, and documentation:  5 min    Follow-up:  12/2022    DAMION Goldstein MD, MS  Endocrinology  [facility]

## 2022-07-11 NOTE — LETTER
7/11/2022         RE: Anahi Gramajo  [address]  [address]  [place] [address]        Dear Colleague,    Thank you for referring your patient, Anahi Gramajo, to the [facility] SPECIALTY [facility]. Please see a copy of my visit note below.      Recent issues:  Diabetes follow-up evaluation, last appt 8/9/21  She stopped the morning doses of metformin and glimeparide a few months ago  Reports good FBG levels overall             Previous history of T2DM, diagnosed ~age 62  Recalls taking oral DM meds, including metformin  Had seen Dr. AMITA Flood/Herb, also Dr. RAMSES Daniel  ~2012 Began use of daily insulin  5/2019. Changed insulin from Levemir to Relion NPH              Initial dosing NPH 50U BID  Fall 2019. Patient concerned with low FBG, decided to stop the morning NPH insulin use    Current DM meds:   Metformin 1000 mg     1-tab in evening  glimeparide 2 mg        1-tab in evening  Relion NPH                 Subcutaneous 40U at bedtime     Uses Accucheck Guide meter   FBG  mg/dl       Recent HP labs include:         Previous FV labs include:       Recent FV labs include:          Lab Results   Component Value Date    A1C 5.4 08/09/2021     08/09/2021    POTASSIUM 4.9 08/09/2021    CHLORIDE 105 08/09/2021    CO2 29 08/09/2021    ANIONGAP 4 08/09/2021     (H) 08/09/2021    BUN 21 08/09/2021    CR 1.04 08/09/2021    GFRESTIMATED 53 (L) 08/09/2021    GFRESTBLACK 70 01/29/2021    LESTER 9.9 08/09/2021    CHOL 210 (H) 08/09/2021    TRIG 204 (H) 08/09/2021    HDL 44 (L) 08/09/2021     (H) 08/09/2021    NHDL 166 (H) 08/09/2021    UCRR 192 08/09/2021    MICROL 28 08/09/2021    UMALCR 14.58 08/09/2021     Lab Results   Component Value Date    TSH 2.14 08/09/2021     [facility], no DR noted  DM complications:                Nephropathy:                          Microalbuminuria        Lives in [place]  She sees Dr. Keila Stevens/[facility] for  gen med evaluations     PMH/PSH:  Past Medical History:   Diagnosis Date     Asthma      Depression      Hyperlipidemia      Labile blood pressure      Obesity      Type 2 diabetes mellitus (H)      No past surgical history on file.    Family Hx:  No family history on file.      Social Hx:  Social History     Socioeconomic History     Marital status:      Spouse name: Not on file     Number of children: Not on file     Years of education: Not on file     Highest education level: Not on file   Occupational History     Not on file   Tobacco Use     Smoking status: Former Smoker     Smokeless tobacco: Never Used   Substance and Sexual Activity     Alcohol use: No     Drug use: No     Sexual activity: Never     Partners: Male   Other Topics Concern     Not on file   Social History Narrative     Not on file     Social Determinants of Health     Financial Resource Strain: Not on file   Food Insecurity: Not on file   Transportation Needs: Not on file   Physical Activity: Not on file   Stress: Not on file   Social Connections: Not on file   Intimate Partner Violence: Not on file   Housing Stability: Not on file          MEDICATIONS:  has a current medication list which includes the following prescription(s): albuterol, alprazolam, aspirin, atorvastatin, glimepiride, ibuprofen, novolin n relion, lisinopril, metformin, omeprazole, accu-chek guide, budesonide-formoterol, vitamin d3, insulin syringe-needle u-100, multivitamin, and vitamin b-12.     ROS: 10 point ROS neg other than the symptoms noted above in the HPI.     GENERAL: mild fatigue, wt stable; fevers, chills, malaise, night sweats.   HEENT: no dysphagia, odonophagia, diplopia, neck pain or tenderness  THYROID:  no apparent hyper or hypothyroid symptoms  CV:  Denies chest pain, pressure or palpitations  LUNGS:  Denies wheezing, cough, SOB, PEREZ  ABDOMEN: no diarrhea, constipation, abdominal pain  EXTREMITIES: ankle swelling; no rashes, ulcers  NEUROLOGY: no  changes in vision, tingling or numbness in hands or feet.   MSK: pains at knees, low back, and right hip pains denies muscle weakness  SKIN: no rashes or lesions  GYN: no menses; no hot flashes or night sweats  ENDOCRINE: no heat or cold intolerance       Physical Exam   VS: /67   Pulse 90   Wt 110.2 kg (242 lb 14.4 oz)   SpO2 96%   BMI 44.43 kg/m    GENERAL: AXOX3, NAD, well dressed, answering questions appropriately, appears stated age.  ENT: no nose swelling or nasal discharge, mouth redness or gum changes.  EYES: eyes grossly normal to inspection, conjunctivae and sclerae normal, no exophthalmos or proptosis  THYROID:  no apparent nodules or goiter  LUNGS: no audible wheeze, cough or visible cyanosis, or increased work of breathing  ABDOMEN: abdomen obese size  EXTREMITIES: mild ankle swelling, pulses R/L DP 1/2, DP 2/2  NEUROLOGY: CN grossly intact, no tremors  MSK: grossly intact  SKIN:  no apparent skin lesions, rash, or edema with visualized skin appearance  PSYCH: mentation appears normal, affect normal/bright, judgement and insight intact,   normal speech and appearance well groomed     LABS:     All pertinent notes, labs, and images personally reviewed by me.        A/P:  Encounter Diagnosis   Name Primary?     Type 2 diabetes mellitus with microalbuminuria, with long-term current use of insulin (H) Yes       Comments:  Reviewed health history and overall diabetes issues.  Reviewed and interpreted tests that I previously ordered.   Ordered appropriate tests for the endocrinology disease management.    Management options discussed and implemented after shared medical decision making with the patient.  T2DM problem is chronic-stable    Plan:  Discussed general issues with the diabetes mellitus diagnosis and management  We discussed the [id number] test which reflects previous overall glucose levels or control  Reviewed the importance of blood glucose (BG) testing to assess glucose  trends  Provided general overview of diabetes (oral and injectable) medication use     Recommended:  Continue current Metformin, glimeparide, and NPH insulin plan  Metformin 1000 mg     1-tab in evening  glimeparide 2 mg        1-tab in evening  Relion NPH                 Subcutaneous 40U in evening     Would not restart the morning metformin and glimeparide doses at this time  Test BG FBG, goal target  mg/dl  Check fasting labs today   Discussed option of nearby [facility] or [facility]   Lab orders placed, paper copy given to her today  Keep focus on diet, exercise, and weight management  Continue atorvastatin 40 mg po QD dose plan  Advise having fasting lipid panel testing and dilated eye examination, at least annually  Will summarize lab results when available     See PCP about the ankle swelling, possible vascular testing with duplex U/S and/or BHARATHI procedure  Addressed patient questions today    There are no Patient Instructions on file for this visit.    Future labs ordered today:   Orders Placed This Encounter   Procedures     TSH     Hemoglobin A1c     Basic metabolic panel     Albumin Random Urine Quantitative with Creat Ratio     Lipid panel reflex to direct LDL Fasting     Radiology/Consults ordered today: None    Total time spent in with the patient evaluation:  22 min  Additional time spent reviewing pertinent lab tests and chart notes, and documentation:  5 min    Follow-up:  12/2022    DAMION Goldstein MD, MS  Endocrinology  [facility]                  Again, thank you for allowing me to participate in the care of your patient.        Sincerely,        Marco Goldstein MD

## 2022-07-16 ENCOUNTER — MYC MEDICAL ADVICE (OUTPATIENT)
Dept: ENDOCRINOLOGY | Facility: CLINIC | Age: 77
End: 2022-07-16

## 2022-07-16 DIAGNOSIS — Z79.4 TYPE 2 DIABETES MELLITUS WITHOUT COMPLICATION, WITH LONG-TERM CURRENT USE OF INSULIN (H): ICD-10-CM

## 2022-07-16 DIAGNOSIS — E11.9 TYPE 2 DIABETES MELLITUS WITHOUT COMPLICATION, WITH LONG-TERM CURRENT USE OF INSULIN (H): ICD-10-CM

## 2022-07-16 RX ORDER — GLIMEPIRIDE 2 MG/1
TABLET ORAL
Qty: 135 TABLET | Refills: 3 | Status: SHIPPED | OUTPATIENT
Start: 2022-07-16 | End: 2024-06-04 | Stop reason: ALTCHOICE

## 2022-07-19 ENCOUNTER — TELEPHONE (OUTPATIENT)
Dept: ENDOCRINOLOGY | Facility: CLINIC | Age: 77
End: 2022-07-19

## 2022-07-19 DIAGNOSIS — R80.9 TYPE 2 DIABETES MELLITUS WITH MICROALBUMINURIA, WITH LONG-TERM CURRENT USE OF INSULIN (H): Primary | ICD-10-CM

## 2022-07-19 DIAGNOSIS — Z79.4 TYPE 2 DIABETES MELLITUS WITH MICROALBUMINURIA, WITH LONG-TERM CURRENT USE OF INSULIN (H): Primary | ICD-10-CM

## 2022-07-19 DIAGNOSIS — E11.29 TYPE 2 DIABETES MELLITUS WITH MICROALBUMINURIA, WITH LONG-TERM CURRENT USE OF INSULIN (H): Primary | ICD-10-CM

## 2022-07-19 NOTE — TELEPHONE ENCOUNTER
Reason for call:  Order   Order or referral being requested: diabetes ed  Reason for request: diag cgm  Date needed: before my next appointment  Has the patient been seen by the PCP for this problem? YES    Additional comments: Patient was advised to schedule with CDE for diagnostic CGM. No referral on file. Referral must be placed prior to visit. Patient is currently scheduled for 8/23.    Phone number to reach patient:  Home number on file [phone] (home)    Best Time:  NA    Can we leave a detailed message on this number?  Not Applicable    Travel screening: Not Applicable     Inez LEVI  Central Scheduler

## 2022-08-23 ENCOUNTER — ALLIED HEALTH/NURSE VISIT (OUTPATIENT)
Dept: EDUCATION SERVICES | Facility: CLINIC | Age: 77
End: 2022-08-23
Payer: MEDICARE

## 2022-08-23 DIAGNOSIS — R80.9 TYPE 2 DIABETES MELLITUS WITH MICROALBUMINURIA, WITH LONG-TERM CURRENT USE OF INSULIN (H): ICD-10-CM

## 2022-08-23 DIAGNOSIS — Z79.4 TYPE 2 DIABETES MELLITUS WITH MICROALBUMINURIA, WITH LONG-TERM CURRENT USE OF INSULIN (H): ICD-10-CM

## 2022-08-23 DIAGNOSIS — E11.29 TYPE 2 DIABETES MELLITUS WITH MICROALBUMINURIA, WITH LONG-TERM CURRENT USE OF INSULIN (H): ICD-10-CM

## 2022-08-23 PROCEDURE — 95250 CONT GLUC MNTR PHYS/QHP EQP: CPT | Performed by: DIETITIAN, REGISTERED

## 2022-08-23 NOTE — PROGRESS NOTES
Diabetes Self-Management Education & Support  Professional Continuous Glucose Monitor Insertion    SUBJECTIVE/OBJECTIVE:     Anahi Gramajo presents for professional Continuous Glucose Monitor Insertion.     Patient comments/concerns: A1C recently andrea, rarely checking blood sugars so trying to figure out what's going on.     Lab Results:  Lab Results   Component Value Date    A1C 5.4 08/09/2021    A1C 7.4 01/29/2021      Lab Results   Component Value Date     08/09/2021     01/29/2021       Medication:  Diabetes Medication(s)     Biguanides       metFORMIN (GLUCOPHAGE) 1000 MG tablet    Take 1 tablet (1,000 mg) by mouth 2 times daily (with meals)     Patient taking differently: Take 1,000 mg by mouth once    Insulin       insulin NPH (NOVOLIN N RELION) 100 UNIT/ML vial    INJECT 50-54 UNITS SUBCUTANEOUS 2 TIMES DAILY AS DIRECTED, TOTAL DAILY DOSE APPROX 104 UNITS     Patient taking differently: 40 Units INJECT 50-54 UNITS SUBCUTANEOUS 2 TIMES DAILY AS DIRECTED, TOTAL DAILY DOSE APPROX 104 UNITS    Sulfonylureas       glimepiride (AMARYL) 2 MG tablet    Take 1.5 tablets (3 mg) by mouth daily as directed          ASSESSMENT:    CGM study indicated for: Difficult to manage hypoglycemia and/or hyperglycemia, Unexplained fluctuations in glucose values     INTERVENTION:   Sensor started today.     Sensor Type: LibrePro  Lot #: [id number]  Serial #: [id number]  Expiration Date: 08/31/22  Diabetes management related comments/concerns: Planning to wear sensor for 14 days to get more info 2/2 recent rise in A1C    Sensor was inserted with no resistance or bleeding at insertion site.      Pt will plan to wear the sensor through 9/6/22.    WRITTEN AND VERBAL INFORMATION GIVEN TO SUPPORT UNDERSTANDING OF:  LibrePro CGM: Sensor insertion, intention of monitoring for 14 days. Keep records of BG, food intake, exercise, and medication dosing during wear.       Patient verbalizes understanding of how to remove sensor,  if needed, and all instructions provided.     Educational and other materials:  Food/exercise/medication log sheets  Contact information    PLAN:  Pt was given instructions for tracking BG, medications, food intake and activity.  Patient to return all items associated with the professional Continuous Glucose Monitor System.  See Patient Instructions, AVS printed and provided to patient today.    Follow-up:    Follow up on 9/6/22.    Andreea Robertson RD, LD, CDCES   Time spent in DSMT: 15 minutes   Time spent in CGM insertion: 5 minutes, in addition to time spent in DSMT  Encounter Type: Individual

## 2022-08-23 NOTE — LETTER
8/23/2022         RE: Anahi Gramajo  [address]  [address]  [place] [address]        Dear Colleague,    Thank you for referring your patient, Anahi Gramajo, to the [facility] SPECIALTY [facility]. Please see a copy of my visit note below.    Diabetes Self-Management Education & Support  Professional Continuous Glucose Monitor Insertion    SUBJECTIVE/OBJECTIVE:     Anahi Gramajo presents for professional Continuous Glucose Monitor Insertion.     Patient comments/concerns: A1C recently andrea, rarely checking blood sugars so trying to figure out what's going on.     Lab Results:  Lab Results   Component Value Date    A1C 5.4 08/09/2021    A1C 7.4 01/29/2021      Lab Results   Component Value Date     08/09/2021     01/29/2021       Medication:  Diabetes Medication(s)     Biguanides       metFORMIN (GLUCOPHAGE) 1000 MG tablet    Take 1 tablet (1,000 mg) by mouth 2 times daily (with meals)     Patient taking differently: Take 1,000 mg by mouth once    Insulin       insulin NPH (NOVOLIN N RELION) 100 UNIT/ML vial    INJECT 50-54 UNITS SUBCUTANEOUS 2 TIMES DAILY AS DIRECTED, TOTAL DAILY DOSE APPROX 104 UNITS     Patient taking differently: 40 Units INJECT 50-54 UNITS SUBCUTANEOUS 2 TIMES DAILY AS DIRECTED, TOTAL DAILY DOSE APPROX 104 UNITS    Sulfonylureas       glimepiride (AMARYL) 2 MG tablet    Take 1.5 tablets (3 mg) by mouth daily as directed          ASSESSMENT:    CGM study indicated for: Difficult to manage hypoglycemia and/or hyperglycemia, Unexplained fluctuations in glucose values     INTERVENTION:   Sensor started today.     Sensor Type: LibrePro  Lot #: [id number]  Serial #: [id number]  Expiration Date: 08/31/22  Diabetes management related comments/concerns: Planning to wear sensor for 14 days to get more info 2/2 recent rise in A1C    Sensor was inserted with no resistance or bleeding at insertion site.      Pt will plan to wear the sensor through 9/6/22.    WRITTEN AND  VERBAL INFORMATION GIVEN TO SUPPORT UNDERSTANDING OF:  LibrePro CGM: Sensor insertion, intention of monitoring for 14 days. Keep records of BG, food intake, exercise, and medication dosing during wear.       Patient verbalizes understanding of how to remove sensor, if needed, and all instructions provided.     Educational and other materials:  Food/exercise/medication log sheets  Contact information    PLAN:  Pt was given instructions for tracking BG, medications, food intake and activity.  Patient to return all items associated with the professional Continuous Glucose Monitor System.  See Patient Instructions, AVS printed and provided to patient today.    Follow-up:    Follow up on 9/6/22.    Andreea Robertson RD, LD, [facility]   Time spent in DSMT: 15 minutes   Time spent in CGM insertion: 5 minutes, in addition to time spent in DSMT  Encounter Type: Individual

## 2022-08-23 NOTE — PATIENT INSTRUCTIONS
1. Plan to wear the LibrePro sensor for 14 days. It is okay to shower, bathe, and swim (up to 3 feet deep for 30 minutes)    2. Continue with your usual diabetes care plan - check blood sugars and take medicines, as prescribed.    3. Keep a log of what you eat and drink, when you take your medications and how much you take, and exercise you do while you are wearing the sensor.    4. Do not cover the sensor with extra adhesive (the small hole in the center of the sensor must remain uncovered)    5. Use a little extra care, especially when getting dressed or exercising, to avoid accidentally loosening or removing the sensor.     6. Remove the sensor if you need to have an MRI or CT scan.     If the LibrePro sensor comes off early, place it in a plastic bag or envelope and call your diabetes educator or bring it with you to your follow-up visit.     Return the sensor to the [facility] on 9/6/22.    Follow-up appointment: 9/6/22 @ 12:30p    [facility] for the [facility] Area:  For Your Diabetes Education and Nutrition Appointments Call:  [phone]   For Diabetes Education or Nutrition Related Questions:   Phone: [phone]  Send Mister Bell Message   If you need a medication refill please contact your pharmacy. Please allow 3 business days for your refills to be completed.

## 2022-09-06 ENCOUNTER — ALLIED HEALTH/NURSE VISIT (OUTPATIENT)
Dept: EDUCATION SERVICES | Facility: CLINIC | Age: 77
End: 2022-09-06
Payer: MEDICARE

## 2022-09-06 DIAGNOSIS — Z79.4 TYPE 2 DIABETES MELLITUS WITHOUT COMPLICATION, WITH LONG-TERM CURRENT USE OF INSULIN (H): Primary | ICD-10-CM

## 2022-09-06 DIAGNOSIS — E11.9 TYPE 2 DIABETES MELLITUS WITHOUT COMPLICATION, WITH LONG-TERM CURRENT USE OF INSULIN (H): Primary | ICD-10-CM

## 2022-09-06 PROCEDURE — G0108 DIAB MANAGE TRN  PER INDIV: HCPCS | Performed by: DIETITIAN, REGISTERED

## 2022-09-06 NOTE — LETTER
9/6/2022         RE: Anahi Gramajo  [address]  [address]  [facility] [address]        Dear Colleague,    Thank you for referring your patient, Anahi Gramajo, to the [facility]. Please see a copy of my visit note below.    Diabetes Self-Management Education & Support    Presents for: CGM Review    CDE VISIT MODE: In Person    Assessment Type:   REPORTS:            Pt verbalized understanding of concepts discussed and recommendations provided today.       Continue education with the following diabetes management concepts: Monitoring, Taking Medication, Problem Solving, Reducing Risks and Healthy Coping    ASSESSMENT:  Patient has made adjustments to her own diabetes medications - not taking either metformin or glimepiride as prescribed. Seeing significant variability in blood sugars - both extreme highs & lows - as a result. Reviewed changes in medications, diet, & exercise to help with this.     Glucose Patterns & Trends:  Hyperglycemia, weekend- postmeal and weekday- postmeal and Hypoglycemia, weekend- nocturnal and weekday- nocturnal      PLAN    Switch glimepiride dosing from HS to PM with food   Increase to prescribed metformin dose and take each tablet ~12 hours apart. eGFR on last check was at 49 - currently not contraindicated but will reach out to Endocrinology to see if he would like to do something differently, may require more renal monitoring   Decrease NPH 0-0-0-40 --> 0-0-0-36, did discuss BID dosing but patient declined this today  Get A1C rechecked every 3 months, due for recheck any time after 10/13/22 & future orders placed   [facility] to see how above changes are working for patient      Topics to cover at upcoming visits: Monitoring, Taking Medication, Problem Solving, Reducing Risks and Healthy Coping    Follow-up: 11/7/22    See Care Plan for co-developed, patient-state behavior change goals.  AVS provided for  "patient today.    Education Materials Provided:  Christine report      SUBJECTIVE/OBJECTIVE:  Presents for: CGM Review  Accompanied by: Self  Diabetes education in the past 24mo: Yes  Focus of Visit: CGM  Type of CGM visit: Professional CGM  Diabetes type: Type 2  Date of diagnosis: 2009  How confident are you filling out medical forms by yourself:: Not Assessed  Diabetes management related comments/concerns: Didn't even notice the sensor while it was on  Transportation concerns: No  Difficulty affording diabetes medication?: Yes  Difficulty affording diabetes testing supplies?: Yes  Other concerns:: None  Cultural Influences/Ethnic Background:  Choose not to answer      Diabetes Symptoms & Complications:     Complications assessed today?: No    Patient Problem List and Family Medical History reviewed for relevant medical history, current medical status, and diabetes risk factors.    Vitals:  There were no vitals taken for this visit.  Estimated body mass index is 44.43 kg/m  as calculated from the following:    Height as of 1/31/20: 1.575 m (5' 2\").    Weight as of 7/11/22: 110.2 kg (242 lb 14.4 oz).   Last 3 BP:   BP Readings from Last 3 Encounters:   07/11/22 112/67   08/09/21 132/80   01/31/20 (!) 164/80       History   Smoking Status     Former Smoker   Smokeless Tobacco     Never Used       Labs:  Lab Results   Component Value Date    A1C 5.4 08/09/2021    A1C 7.4 01/29/2021     Lab Results   Component Value Date     08/09/2021     01/29/2021     Lab Results   Component Value Date     08/09/2021    LDL 91 01/29/2021     HDL Cholesterol   Date Value Ref Range Status   01/29/2021 46 (L) >49 mg/dL Final     Direct Measure HDL   Date Value Ref Range Status   08/09/2021 44 (L) >=50 mg/dL Final     Comment:     0-19 years:       Greater than or equal to 45 mg/dL   Low: Less than 40 mg/dL   Borderline low: 40-44 mg/dL     20 years and older:   Female: Greater than or equal to 50 mg/dL   Male:   " Greater than or equal to 40 mg/dL        ]  GFR Estimate   Date Value Ref Range Status   08/09/2021 53 (L) >60 mL/min/1.73m2 Final     Comment:     As of July 11, 2021, eGFR is calculated by the CKD-EPI creatinine equation, without race adjustment. eGFR can be influenced by muscle mass, exercise, and diet. The reported eGFR is an estimation only and is only applicable if the renal function is stable.   01/29/2021 60 (L) >60 mL/min/[1.73_m2] Final     Comment:     Non  GFR Calc  Starting 12/18/2018, serum creatinine based estimated GFR (eGFR) will be   calculated using the Chronic Kidney Disease Epidemiology Collaboration   (CKD-EPI) equation.       GFR Estimate If Black   Date Value Ref Range Status   01/29/2021 70 >60 mL/min/[1.73_m2] Final     Comment:      GFR Calc  Starting 12/18/2018, serum creatinine based estimated GFR (eGFR) will be   calculated using the Chronic Kidney Disease Epidemiology Collaboration   (CKD-EPI) equation.       Lab Results   Component Value Date    CR 1.04 08/09/2021    CR 0.94 01/29/2021     No results found for: MICROALBUMIN    Healthy Eating:  Healthy Eating Assessed Today: Yes  Cultural/Confucianist diet restrictions?: No  Meal planning/habits: Keeps food records  Breakfast: skips or glass of OJ  Lunch: sandwich and crackers maybe (triscuits or wheat thins)  Dinner: burger OR chicken OR stouffers frozen meal   Snacks: sweet tooth in the afternoon and will eat whatever is around and might have popcorn after dinner or slice of cheese or chex mix  Beverages: Milk, Coffee, Water, Diet soda, Soda, Juice  Has patient met with a dietitian in the past?: Yes    Being Active:  Being Active Assessed Today: Yes  Exercise:: Currently not exercising  Barrier to exercise: Physical limitation    Monitoring:  Monitoring Assessed Today: Yes  Did patient bring glucose meter to appointment? : No  Blood Glucose Meter: Unknown    See LibrePro report above     Taking  Medications:  Diabetes Medication(s)     Biguanides       metFORMIN (GLUCOPHAGE) 1000 MG tablet    Take 1 tablet (1,000 mg) by mouth 2 times daily (with meals)     Patient taking differently: Take 1,000 mg by mouth At Bedtime    Insulin       insulin NPH (NOVOLIN N RELION) 100 UNIT/ML vial    INJECT 50-54 UNITS SUBCUTANEOUS 2 TIMES DAILY AS DIRECTED, TOTAL DAILY DOSE APPROX 104 UNITS     Patient taking differently: Inject 40 Units Subcutaneous At Bedtime INJECT 50-54 UNITS SUBCUTANEOUS 2 TIMES DAILY AS DIRECTED, TOTAL DAILY DOSE APPROX 104 UNITS    Sulfonylureas       glimepiride (AMARYL) 2 MG tablet    Take 1.5 tablets (3 mg) by mouth daily as directed     Patient taking differently: Take 2 mg by mouth At Bedtime Take 1.5 tablets (3 mg) by mouth daily as directed          Taking Medication Assessed Today: Yes  Current Treatments: Insulin Injections, Oral Medication (taken by mouth)  Dose schedule: At bedtime  Given by: Patient  Problems taking diabetes medications regularly?: Yes  Diabetes medication side effects?: No    Problem Solving:  Problem Solving Assessed Today: Yes  Is the patient at risk for hypoglycemia?: Yes  Hypoglycemia Frequency: Weekly  Hypoglycemia Treatment: Juice  Patient carries a carbohydrate source: Yes  Is the patient at risk for DKA?: No  Does patient have severe weather/disaster plan for diabetes management?: No  Does patient have sick day plan for diabetes management?: No    Hypoglycemia symptoms  Sweats: Yes  Feeling shaky: Yes    Hypoglycemia Complications  Nocturnal hypoglycemia: Yes    Reducing Risks:  Reducing Risks Assessed Today: No    Healthy Coping:  Healthy Coping Assessed Today: Yes  Emotional response to diabetes: Ready to learn  Stage of change: PREPARATION (Decided to change - considering how)  Support resources: None  Patient Activation Measure Survey Score:  No flowsheet data found.      Care Plan and Education Provided:  Care Plan: Diabetes   Updates made by Andreea Robertson  MANDY since 9/6/2022 12:00 AM      Problem: Diabetes Self-Management Education Needed to Optimize Self-Care Behaviors       Goal: Healthy Eating - follow a healthy eating pattern for diabetes       Task: Provide education on portion control and consistency in amount, composition and timing of food intake Completed 9/6/2022   Responsible User: Andreea Robertson RD      Goal: Monitoring - monitor glucose and ketones as directed    This Visit's Progress: 100%   Recent Progress: 0%   Note:    I will wear the LibrePro continuous glucose monitor x 14 days and take notes on what I eat, activity & the medicine I take for my diabetes in this time.      Goal: Taking Medication - patient is consistently taking medications as directed       Task: Provide education on action of prescribed medication, including when to take and possible side effects Completed 9/6/2022   Responsible User: Andreea Robertson RD      Task: Discuss barriers to medication adherence with patient and provide management technique ideas as appropriate Completed 9/6/2022   Responsible User: Andreea Roberston RD      Task: Provide education on frequency and refill details of medications Completed 9/6/2022   Responsible User: Andreea Robertson RD      Goal: Problem Solving - know how to prevent and manage short-term diabetes complications       Task: Provide education on low blood glucose - causes, signs/symptoms, prevention, treatment, carrying a carbohydrate source at all times, and medical identification Completed 9/6/2022   Responsible User: Andreea Robertson RD      Goal: Reducing Risks - know how to prevent and treat long-term diabetes complications       Task: Provide education on Hemoglobin A1c - goals and relationship to blood glucose levels Completed 9/6/2022   Responsible User: Andreea Robertson RD Elise Graham, RD, RINA, [facility]     Time Spent: 50 minutes  Encounter Type: Individual    Any diabetes medication dose changes were made via the CDE Protocol per the  patient's referring provider. A copy of this encounter was shared with the provider.

## 2022-09-06 NOTE — PATIENT INSTRUCTIONS
- Take 2 tablets (2000 mg) of metformin daily - 1 at noon and the other at bedtime   - Switch the glimepiride to mid-day with your bigger meal (noon-3p)  - Decrease your bedtime insulin dose from 40 units to 36 units to avoid low blood sugars   - Try to schedule your meals & snacks a bit to work on more consistency  - Cut out the sugary drinks - Sprite, lemonade, orange juice    Call or send a Sosei message with any questions or concerns    Andreea Robertson RD, LD, Formerly named [facility]   Diabetes Education Triage Line: [phone]  Diabetes Education Appointment [phone]

## 2022-09-06 NOTE — PROGRESS NOTES
Diabetes Self-Management Education & Support    Presents for: CGM Review    CDE VISIT MODE: In Person    Assessment Type:   REPORTS:            Pt verbalized understanding of concepts discussed and recommendations provided today.       Continue education with the following diabetes management concepts: Monitoring, Taking Medication, Problem Solving, Reducing Risks and Healthy Coping    ASSESSMENT:  Patient has made adjustments to her own diabetes medications - not taking either metformin or glimepiride as prescribed. Seeing significant variability in blood sugars - both extreme highs & lows - as a result. Reviewed changes in medications, diet, & exercise to help with this.     Glucose Patterns & Trends:  Hyperglycemia, weekend- postmeal and weekday- postmeal and Hypoglycemia, weekend- nocturnal and weekday- nocturnal      PLAN    Switch glimepiride dosing from HS to PM with food   Increase to prescribed metformin dose and take each tablet ~12 hours apart. eGFR on last check was at 49 - currently not contraindicated but will reach out to Endocrinology to see if he would like to do something differently, may require more renal monitoring   Decrease NPH 0-0-0-40 --> 0-0-0-36, did discuss BID dosing but patient declined this today  Get A1C rechecked every 3 months, due for recheck any time after 10/13/22 & future orders placed   Redo LibrePro diagnostic CGM study in 2 months to see how above changes are working for patient      Topics to cover at upcoming visits: Monitoring, Taking Medication, Problem Solving, Reducing Risks and Healthy Coping    Follow-up: 11/7/22    See Care Plan for co-developed, patient-state behavior change goals.  AVS provided for patient today.    Education Materials Provided:  Christine report      SUBJECTIVE/OBJECTIVE:  Presents for: CGM Review  Accompanied by: Self  Diabetes education in the past 24mo: Yes  Focus of Visit: CGM  Type of CGM visit: Professional CGM  Diabetes type: Type 2  Date  "of diagnosis: 2009  How confident are you filling out medical forms by yourself:: Not Assessed  Diabetes management related comments/concerns: Didn't even notice the sensor while it was on  Transportation concerns: No  Difficulty affording diabetes medication?: Yes  Difficulty affording diabetes testing supplies?: Yes  Other concerns:: None  Cultural Influences/Ethnic Background:  Choose not to answer      Diabetes Symptoms & Complications:     Complications assessed today?: No    Patient Problem List and Family Medical History reviewed for relevant medical history, current medical status, and diabetes risk factors.    Vitals:  There were no vitals taken for this visit.  Estimated body mass index is 44.43 kg/m  as calculated from the following:    Height as of 1/31/20: 1.575 m (5' 2\").    Weight as of 7/11/22: 110.2 kg (242 lb 14.4 oz).   Last 3 BP:   BP Readings from Last 3 Encounters:   07/11/22 112/67   08/09/21 132/80   01/31/20 (!) 164/80       History   Smoking Status     Former Smoker   Smokeless Tobacco     Never Used       Labs:  Lab Results   Component Value Date    A1C 5.4 08/09/2021    A1C 7.4 01/29/2021     Lab Results   Component Value Date     08/09/2021     01/29/2021     Lab Results   Component Value Date     08/09/2021    LDL 91 01/29/2021     HDL Cholesterol   Date Value Ref Range Status   01/29/2021 46 (L) >49 mg/dL Final     Direct Measure HDL   Date Value Ref Range Status   08/09/2021 44 (L) >=50 mg/dL Final     Comment:     0-19 years:       Greater than or equal to 45 mg/dL   Low: Less than 40 mg/dL   Borderline low: 40-44 mg/dL     20 years and older:   Female: Greater than or equal to 50 mg/dL   Male:   Greater than or equal to 40 mg/dL        ]  GFR Estimate   Date Value Ref Range Status   08/09/2021 53 (L) >60 mL/min/1.73m2 Final     Comment:     As of July 11, 2021, eGFR is calculated by the CKD-EPI creatinine equation, without race adjustment. eGFR can be influenced " by muscle mass, exercise, and diet. The reported eGFR is an estimation only and is only applicable if the renal function is stable.   01/29/2021 60 (L) >60 mL/min/[1.73_m2] Final     Comment:     Non  GFR Calc  Starting 12/18/2018, serum creatinine based estimated GFR (eGFR) will be   calculated using the Chronic Kidney Disease Epidemiology Collaboration   (CKD-EPI) equation.       GFR Estimate If Black   Date Value Ref Range Status   01/29/2021 70 >60 mL/min/[1.73_m2] Final     Comment:      GFR Calc  Starting 12/18/2018, serum creatinine based estimated GFR (eGFR) will be   calculated using the Chronic Kidney Disease Epidemiology Collaboration   (CKD-EPI) equation.       Lab Results   Component Value Date    CR 1.04 08/09/2021    CR 0.94 01/29/2021     No results found for: MICROALBUMIN    Healthy Eating:  Healthy Eating Assessed Today: Yes  Cultural/Restoration diet restrictions?: No  Meal planning/habits: Keeps food records  Breakfast: skips or glass of OJ  Lunch: sandwich and crackers maybe (triscuits or wheat thins)  Dinner: burger OR chicken OR stouffers frozen meal   Snacks: sweet tooth in the afternoon and will eat whatever is around and might have popcorn after dinner or slice of cheese or chex mix  Beverages: Milk, Coffee, Water, Diet soda, Soda, Juice  Has patient met with a dietitian in the past?: Yes    Being Active:  Being Active Assessed Today: Yes  Exercise:: Currently not exercising  Barrier to exercise: Physical limitation    Monitoring:  Monitoring Assessed Today: Yes  Did patient bring glucose meter to appointment? : No  Blood Glucose Meter: Unknown    See Joseline report above     Taking Medications:  Diabetes Medication(s)     Biguanides       metFORMIN (GLUCOPHAGE) 1000 MG tablet    Take 1 tablet (1,000 mg) by mouth 2 times daily (with meals)     Patient taking differently: Take 1,000 mg by mouth At Bedtime    Insulin       insulin NPH (NOVOLIN N RELION) 100  UNIT/ML vial    INJECT 50-54 UNITS SUBCUTANEOUS 2 TIMES DAILY AS DIRECTED, TOTAL DAILY DOSE APPROX 104 UNITS     Patient taking differently: Inject 40 Units Subcutaneous At Bedtime INJECT 50-54 UNITS SUBCUTANEOUS 2 TIMES DAILY AS DIRECTED, TOTAL DAILY DOSE APPROX 104 UNITS    Sulfonylureas       glimepiride (AMARYL) 2 MG tablet    Take 1.5 tablets (3 mg) by mouth daily as directed     Patient taking differently: Take 2 mg by mouth At Bedtime Take 1.5 tablets (3 mg) by mouth daily as directed          Taking Medication Assessed Today: Yes  Current Treatments: Insulin Injections, Oral Medication (taken by mouth)  Dose schedule: At bedtime  Given by: Patient  Problems taking diabetes medications regularly?: Yes  Diabetes medication side effects?: No    Problem Solving:  Problem Solving Assessed Today: Yes  Is the patient at risk for hypoglycemia?: Yes  Hypoglycemia Frequency: Weekly  Hypoglycemia Treatment: Juice  Patient carries a carbohydrate source: Yes  Is the patient at risk for DKA?: No  Does patient have severe weather/disaster plan for diabetes management?: No  Does patient have sick day plan for diabetes management?: No    Hypoglycemia symptoms  Sweats: Yes  Feeling shaky: Yes    Hypoglycemia Complications  Nocturnal hypoglycemia: Yes    Reducing Risks:  Reducing Risks Assessed Today: No    Healthy Coping:  Healthy Coping Assessed Today: Yes  Emotional response to diabetes: Ready to learn  Stage of change: PREPARATION (Decided to change - considering how)  Support resources: None  Patient Activation Measure Survey Score:  No flowsheet data found.      Care Plan and Education Provided:  Care Plan: Diabetes   Updates made by Andreea Robertson RD since 9/6/2022 12:00 AM      Problem: Diabetes Self-Management Education Needed to Optimize Self-Care Behaviors       Goal: Healthy Eating - follow a healthy eating pattern for diabetes       Task: Provide education on portion control and consistency in amount, composition  and timing of food intake Completed 9/6/2022   Responsible User: Andreea Robertson RD      Goal: Monitoring - monitor glucose and ketones as directed    This Visit's Progress: 100%   Recent Progress: 0%   Note:    I will wear the LibrePro continuous glucose monitor x 14 days and take notes on what I eat, activity & the medicine I take for my diabetes in this time.      Goal: Taking Medication - patient is consistently taking medications as directed       Task: Provide education on action of prescribed medication, including when to take and possible side effects Completed 9/6/2022   Responsible User: Andreea Robertson RD      Task: Discuss barriers to medication adherence with patient and provide management technique ideas as appropriate Completed 9/6/2022   Responsible User: Andreea Robertson RD      Task: Provide education on frequency and refill details of medications Completed 9/6/2022   Responsible User: Andreea Robertson RD      Goal: Problem Solving - know how to prevent and manage short-term diabetes complications       Task: Provide education on low blood glucose - causes, signs/symptoms, prevention, treatment, carrying a carbohydrate source at all times, and medical identification Completed 9/6/2022   Responsible User: Andreea Robertson RD      Goal: Reducing Risks - know how to prevent and treat long-term diabetes complications       Task: Provide education on Hemoglobin A1c - goals and relationship to blood glucose levels Completed 9/6/2022   Responsible User: Andreea Robertson RD Elise Graham, RD, RINA, [facility]     Time Spent: 50 minutes  Encounter Type: Individual    Any diabetes medication dose changes were made via the CDE Protocol per the patient's referring provider. A copy of this encounter was shared with the provider.

## 2022-10-17 DIAGNOSIS — E78.5 HYPERLIPIDEMIA LDL GOAL <100: ICD-10-CM

## 2022-10-18 RX ORDER — ATORVASTATIN CALCIUM 40 MG/1
40 TABLET, FILM COATED ORAL DAILY
Qty: 90 TABLET | Refills: 1 | Status: SHIPPED | OUTPATIENT
Start: 2022-10-18 | End: 2023-04-25

## 2022-10-18 NOTE — TELEPHONE ENCOUNTER
"Last Written Prescription Date:  4/26/22  Last Fill Quantity: 90,  # refills: 0   Last office visit: 7/11/2022 with prescribing provider: Dr. Goldstein  Future Office Visit: 12/14/22         Requested Prescriptions   Pending Prescriptions Disp Refills     atorvastatin (LIPITOR) 40 MG tablet 90 tablet 0     Sig: Take 1 tablet (40 mg) by mouth daily       Statins Protocol Failed - 10/17/2022  6:25 PM        Failed - LDL on file in past 12 months     Recent Labs   Lab Test 08/09/21  1124   *             Passed - No abnormal creatine kinase in past 12 months     No lab results found.             Passed - Recent (12 mo) or future (30 days) visit within the authorizing provider's specialty     Patient has had an office visit with the authorizing provider or a provider within the authorizing providers department within the previous 12 mos or has a future within next 30 days. See \"Patient Info\" tab in inbasket, or \"Choose Columns\" in Meds & Orders section of the refill encounter.              Passed - Medication is active on med list        Passed - Patient is age 18 or older        Passed - No active pregnancy on record        Passed - No positive pregnancy test in past 12 months           Refill sent per protocol  Mita Humphreys RN    "

## 2022-10-22 ENCOUNTER — HEALTH MAINTENANCE LETTER (OUTPATIENT)
Age: 77
End: 2022-10-22

## 2022-11-07 ENCOUNTER — ALLIED HEALTH/NURSE VISIT (OUTPATIENT)
Dept: EDUCATION SERVICES | Facility: CLINIC | Age: 77
End: 2022-11-07
Payer: MEDICARE

## 2022-11-07 DIAGNOSIS — E11.9 TYPE 2 DIABETES MELLITUS WITHOUT COMPLICATION, WITH LONG-TERM CURRENT USE OF INSULIN (H): Primary | ICD-10-CM

## 2022-11-07 DIAGNOSIS — Z79.4 TYPE 2 DIABETES MELLITUS WITHOUT COMPLICATION, WITH LONG-TERM CURRENT USE OF INSULIN (H): Primary | ICD-10-CM

## 2022-11-07 PROCEDURE — 95250 CONT GLUC MNTR PHYS/QHP EQP: CPT | Performed by: INTERNAL MEDICINE

## 2022-11-07 NOTE — PATIENT INSTRUCTIONS
1. Plan to wear the LibrePro sensor for 14 days. It is okay to shower, bathe, and swim (up to 3 feet deep for 30 minutes)    2. Continue with your usual diabetes care plan - check blood sugars and take medicines, as prescribed.    3. Keep a log of what you eat and drink, when you take your medications and how much you take, and exercise you do while you are wearing the sensor.    4. Do not cover the sensor with extra adhesive (the small hole in the center of the sensor must remain uncovered)    5. Use a little extra care, especially when getting dressed or exercising, to avoid accidentally loosening or removing the sensor.     6. Remove the sensor if you need to have an MRI or CT scan.     If the LibrePro sensor comes off early, place it in a plastic bag or envelope and call your diabetes educator or bring it with you to your follow-up visit.     Return the sensor to the [facility] on 11/21.    Follow-up appointment: 11/21 @ 12:30p    [place] [facility] for the [facility]:  For Your Diabetes Education and Nutrition Appointments Call:  [phone]   For Diabetes Education or Nutrition Related Questions:   Phone: [phone]  Send Playmysong Message   If you need a medication refill please contact your pharmacy. Please allow 3 business days for your refills to be completed.

## 2022-11-07 NOTE — LETTER
11/7/2022         RE: Anahi Gramajo  [address]  [address]  [facility] [address]        Dear Colleague,    Thank you for referring your patient, Anahi Gramajo, to the [facility]. Please see a copy of my visit note below.    Diabetes Self-Management Education & Support  Professional Continuous Glucose Monitor Insertion    SUBJECTIVE/OBJECTIVE:     Anahi Gramajo presents for professional Continuous Glucose Monitor Insertion.     Patient comments/concerns: Still missing majority of meds, has not rechecked A1C as she wants to get labs run at outside clinic.     Lab Results:  Lab Results   Component Value Date    A1C 5.4 08/09/2021    A1C 7.4 01/29/2021      Lab Results   Component Value Date     08/09/2021     01/29/2021       Medication:  Diabetes Medication(s)     Biguanides       metFORMIN (GLUCOPHAGE) 1000 MG tablet    Take 1 tablet (1,000 mg) by mouth 2 times daily (with meals)     Patient taking differently: Take 1,000 mg by mouth At Bedtime    Insulin       insulin NPH (NOVOLIN N RELION) 100 UNIT/ML vial    INJECT 50-54 UNITS SUBCUTANEOUS 2 TIMES DAILY AS DIRECTED, TOTAL DAILY DOSE APPROX 104 UNITS     Patient taking differently: Inject 40 Units Subcutaneous At Bedtime INJECT 50-54 UNITS SUBCUTANEOUS 2 TIMES DAILY AS DIRECTED, TOTAL DAILY DOSE APPROX 104 UNITS    Sulfonylureas       glimepiride (AMARYL) 2 MG tablet    Take 1.5 tablets (3 mg) by mouth daily as directed     Patient taking differently: Take 2 mg by mouth At Bedtime Take 1.5 tablets (3 mg) by mouth daily as directed          ASSESSMENT:    CGM study indicated for: Difficult to manage hypoglycemia and/or hyperglycemia, Unexplained fluctuations in glucose values     INTERVENTION:   Sensor started today.     Sensor Type: LibrePro  Lot #: [id number]  Serial #: [id number]  Expiration Date: 02/28/23  Diabetes management related comments/concerns: Missing diabetes pills almost every day, taking NPH  insulin as prescribed    Sensor was inserted with no resistance or bleeding at insertion site.      Pt will plan to wear the sensor through 11/21/22.    WRITTEN AND VERBAL INFORMATION GIVEN TO SUPPORT UNDERSTANDING OF:  LibrePro CGM: Sensor insertion, intention of monitoring for 14 days. Keep records of BG, food intake, exercise, and medication dosing during wear.       Patient verbalizes understanding of how to remove sensor, if needed, and all instructions provided.     Educational and other materials:  Food/exercise/medication log sheets  Contact information    PLAN:  Pt was given instructions for tracking BG, medications, food intake and activity.  Patient to return all items associated with the professional Continuous Glucose Monitor System.  See Patient Instructions, AVS printed and provided to patient today.    Follow-up:    Follow up on 11/21/22.    Andreea Robertson RD, LD, [facility]   Time spent in DSMT: 15 minutes   Time spent in CGM insertion: 3 minutes, in addition to time spent in DSMT  Encounter Type: Individual

## 2022-11-07 NOTE — PROGRESS NOTES
Diabetes Self-Management Education & Support  Professional Continuous Glucose Monitor Insertion    SUBJECTIVE/OBJECTIVE:     Anahi Gramajo presents for professional Continuous Glucose Monitor Insertion.     Patient comments/concerns: Still missing majority of meds, has not rechecked A1C as she wants to get labs run at outside clinic.     Lab Results:  Lab Results   Component Value Date    A1C 5.4 08/09/2021    A1C 7.4 01/29/2021      Lab Results   Component Value Date     08/09/2021     01/29/2021       Medication:  Diabetes Medication(s)     Biguanides       metFORMIN (GLUCOPHAGE) 1000 MG tablet    Take 1 tablet (1,000 mg) by mouth 2 times daily (with meals)     Patient taking differently: Take 1,000 mg by mouth At Bedtime    Insulin       insulin NPH (NOVOLIN N RELION) 100 UNIT/ML vial    INJECT 50-54 UNITS SUBCUTANEOUS 2 TIMES DAILY AS DIRECTED, TOTAL DAILY DOSE APPROX 104 UNITS     Patient taking differently: Inject 40 Units Subcutaneous At Bedtime INJECT 50-54 UNITS SUBCUTANEOUS 2 TIMES DAILY AS DIRECTED, TOTAL DAILY DOSE APPROX 104 UNITS    Sulfonylureas       glimepiride (AMARYL) 2 MG tablet    Take 1.5 tablets (3 mg) by mouth daily as directed     Patient taking differently: Take 2 mg by mouth At Bedtime Take 1.5 tablets (3 mg) by mouth daily as directed          ASSESSMENT:    CGM study indicated for: Difficult to manage hypoglycemia and/or hyperglycemia, Unexplained fluctuations in glucose values     INTERVENTION:   Sensor started today.     Sensor Type: LibrePro  Lot #: [id number]  Serial #: [id number]  Expiration Date: 02/28/23  Diabetes management related comments/concerns: Missing diabetes pills almost every day, taking NPH insulin as prescribed    Sensor was inserted with no resistance or bleeding at insertion site.      Pt will plan to wear the sensor through 11/21/22.    WRITTEN AND VERBAL INFORMATION GIVEN TO SUPPORT UNDERSTANDING OF:  LibrePro CGM: Sensor insertion, intention of  monitoring for 14 days. Keep records of BG, food intake, exercise, and medication dosing during wear.       Patient verbalizes understanding of how to remove sensor, if needed, and all instructions provided.     Educational and other materials:  Food/exercise/medication log sheets  Contact information    PLAN:  Pt was given instructions for tracking BG, medications, food intake and activity.  Patient to return all items associated with the professional Continuous Glucose Monitor System.  See Patient Instructions, AVS printed and provided to patient today.    Follow-up:    Follow up on 11/21/22.    Andreea Robertson RD, LD, [facility]   Time spent in DSMT: 15 minutes   Time spent in CGM insertion: 3 minutes, in addition to time spent in DSMT  Encounter Type: Individual

## 2022-11-21 ENCOUNTER — LAB (OUTPATIENT)
Dept: LAB | Facility: CLINIC | Age: 77
End: 2022-11-21
Payer: MEDICARE

## 2022-11-21 ENCOUNTER — ALLIED HEALTH/NURSE VISIT (OUTPATIENT)
Dept: EDUCATION SERVICES | Facility: CLINIC | Age: 77
End: 2022-11-21
Payer: MEDICARE

## 2022-11-21 VITALS — WEIGHT: 233.9 LBS | BODY MASS INDEX: 42.78 KG/M2

## 2022-11-21 DIAGNOSIS — E11.9 TYPE 2 DIABETES MELLITUS WITHOUT COMPLICATION, WITH LONG-TERM CURRENT USE OF INSULIN (H): ICD-10-CM

## 2022-11-21 DIAGNOSIS — E11.9 TYPE 2 DIABETES MELLITUS WITHOUT COMPLICATION, WITH LONG-TERM CURRENT USE OF INSULIN (H): Primary | ICD-10-CM

## 2022-11-21 DIAGNOSIS — Z79.4 TYPE 2 DIABETES MELLITUS WITHOUT COMPLICATION, WITH LONG-TERM CURRENT USE OF INSULIN (H): ICD-10-CM

## 2022-11-21 DIAGNOSIS — Z79.4 TYPE 2 DIABETES MELLITUS WITHOUT COMPLICATION, WITH LONG-TERM CURRENT USE OF INSULIN (H): Primary | ICD-10-CM

## 2022-11-21 LAB — HBA1C MFR BLD: 8.5 % (ref 0–5.6)

## 2022-11-21 PROCEDURE — 36415 COLL VENOUS BLD VENIPUNCTURE: CPT

## 2022-11-21 PROCEDURE — 83036 HEMOGLOBIN GLYCOSYLATED A1C: CPT

## 2022-11-21 PROCEDURE — G0108 DIAB MANAGE TRN  PER INDIV: HCPCS | Performed by: DIETITIAN, REGISTERED

## 2022-11-21 NOTE — PROGRESS NOTES
Diabetes Self-Management Education & Support    Presents for: CGM Review    Type of Service: In Person Visit    Assessment Type:   REPORTS:                  Pt verbalized understanding of concepts discussed and recommendations provided today.       Continue education with the following diabetes management concepts: Monitoring, Taking Medication, Problem Solving, Reducing Risks and Healthy Coping    ASSESSMENT:  Anahi has restarted her glimepiride, still not taking full dose off metformin and no change to insulin dosing. Consistent trend in hyperglycemia post-meals every day and numbers stay high all  Afternoon/evening. Reviewed need for more  Medication to help with this. Patient unwilling to add another injection of NPH in AM. She is will to try and take glimepiride and 2nd tablet of metformin during the day with food (same recs as last visit). She has cut out sugary beverages since last visit. Discussed use of metformin XR and she would prefer to try and take 1 tablet with first meal of day and 2nd tablet at HS. Reviewed low blood sugar and potential need for less insulin - will instead adjust glimepiride dose to take with food & during the day, rather than at bedtime.     Glucose Patterns & Trends:  Hyperglycemia, weekend- postmeal and weekday- postmeal and Hypoglycemia, weekend- nocturnal      PLAN  Take 2 tablets (2000 mg) of metformin daily - 1 at noon and the other at bedtime   Switch the glimepiride to mid-day with your bigger meal (noon-3p)  Consider another LibrePro study in 2-3 months  Get A1C rechecked, per Endocrinology     Topics to cover at upcoming visits: Monitoring, Taking Medication, Problem Solving, Reducing Risks and Healthy Coping    Follow-up: as needed, consider LibrePro study in February     See Care Plan for co-developed, patient-state behavior change goals.  AVS provided for patient today.    Education Materials Provided:  [employer] printout       SUBJECTIVE/OBJECTIVE:  Presents for: CGM  "Review  Accompanied by: Self  Diabetes education in the past 24mo: Yes  Focus of Visit: CGM  Type of CGM visit: Professional CGM  Diabetes type: Type 2  Date of diagnosis: 2009  How confident are you filling out medical forms by yourself:: Not Assessed  Diabetes management related comments/concerns: Restarted glimepiride over the past few days, taking at bedtime, had 1 low BG with symptoms that she treated with juice  Transportation concerns: No  Difficulty affording diabetes medication?: Yes  Difficulty affording diabetes testing supplies?: Yes  Other concerns:: None  Cultural Influences/Ethnic Background:  Choose not to answer      Diabetes Symptoms & Complications:  Weight trend: Decreasing  Complications assessed today?: No    Patient Problem List and Family Medical History reviewed for relevant medical history, current medical status, and diabetes risk factors.    Vitals:  Wt 106.1 kg (233 lb 14.4 oz)   BMI 42.78 kg/m    Estimated body mass index is 42.78 kg/m  as calculated from the following:    Height as of 1/31/20: 1.575 m (5' 2\").    Weight as of this encounter: 106.1 kg (233 lb 14.4 oz).   Last 3 BP:   BP Readings from Last 3 Encounters:   07/11/22 112/67   08/09/21 132/80   01/31/20 (!) 164/80       History   Smoking Status     Former   Smokeless Tobacco     Never       Labs:  Lab Results   Component Value Date    A1C 5.4 08/09/2021    A1C 7.4 01/29/2021     Lab Results   Component Value Date     08/09/2021     01/29/2021     Lab Results   Component Value Date     08/09/2021    LDL 91 01/29/2021     HDL Cholesterol   Date Value Ref Range Status   01/29/2021 46 (L) >49 mg/dL Final     Direct Measure HDL   Date Value Ref Range Status   08/09/2021 44 (L) >=50 mg/dL Final     Comment:     0-19 years:       Greater than or equal to 45 mg/dL   Low: Less than 40 mg/dL   Borderline low: 40-44 mg/dL     20 years and older:   Female: Greater than or equal to 50 mg/dL   Male:   Greater than or " equal to 40 mg/dL        ]  GFR Estimate   Date Value Ref Range Status   08/09/2021 53 (L) >60 mL/min/1.73m2 Final     Comment:     As of July 11, 2021, eGFR is calculated by the CKD-EPI creatinine equation, without race adjustment. eGFR can be influenced by muscle mass, exercise, and diet. The reported eGFR is an estimation only and is only applicable if the renal function is stable.   01/29/2021 60 (L) >60 mL/min/[1.73_m2] Final     Comment:     Non  GFR Calc  Starting 12/18/2018, serum creatinine based estimated GFR (eGFR) will be   calculated using the Chronic Kidney Disease Epidemiology Collaboration   (CKD-EPI) equation.       GFR Estimate If Black   Date Value Ref Range Status   01/29/2021 70 >60 mL/min/[1.73_m2] Final     Comment:      GFR Calc  Starting 12/18/2018, serum creatinine based estimated GFR (eGFR) will be   calculated using the Chronic Kidney Disease Epidemiology Collaboration   (CKD-EPI) equation.       Lab Results   Component Value Date    CR 1.04 08/09/2021    CR 0.94 01/29/2021     No results found for: MICROALBUMIN    Healthy Eating:  Healthy Eating Assessed Today: Yes  Cultural/Jehovah's witness diet restrictions?: No  Meal planning/habits: Keeps food records  Meals include: Lunch, Dinner, Afternoon Snack, Evening Snack  Breakfast: skips or glass of OJ  Lunch: sandwich and crackers maybe (triscuits or wheat thins)  Dinner: burger OR chicken OR stouffers frozen meal   Snacks: sweet tooth in the afternoon and will eat whatever is around and might have popcorn after dinner or slice of cheese or chex mix  Beverages: Milk, Coffee, Water, Diet soda, Juice  Has patient met with a dietitian in the past?: Yes    Being Active:  Being Active Assessed Today: Yes  Exercise:: Currently not exercising  Barrier to exercise: Physical limitation    Monitoring:  Monitoring Assessed Today: Yes  Did patient bring glucose meter to appointment? : No  Blood Glucose Meter: Unknown  Times  checking blood sugar at home (number): Never    See above.     Taking Medications:  Diabetes Medication(s)     Biguanides       metFORMIN (GLUCOPHAGE) 1000 MG tablet    Take 1 tablet (1,000 mg) by mouth 2 times daily (with meals)     Patient taking differently: Take 1,000 mg by mouth At Bedtime    Insulin       insulin NPH (NOVOLIN N RELION) 100 UNIT/ML vial    INJECT 50-54 UNITS SUBCUTANEOUS 2 TIMES DAILY AS DIRECTED, TOTAL DAILY DOSE APPROX 104 UNITS     Patient taking differently: Inject 40 Units Subcutaneous At Bedtime INJECT 50-54 UNITS SUBCUTANEOUS 2 TIMES DAILY AS DIRECTED, TOTAL DAILY DOSE APPROX 104 UNITS    Sulfonylureas       glimepiride (AMARYL) 2 MG tablet    Take 1.5 tablets (3 mg) by mouth daily as directed     Patient taking differently: Take 2 mg by mouth At Bedtime Take 1.5 tablets (3 mg) by mouth daily as directed          Taking Medication Assessed Today: Yes  Current Treatments: Insulin Injections, Oral Medication (taken by mouth)  Dose schedule: At bedtime  Given by: Patient  Problems taking diabetes medications regularly?: Yes  Diabetes medication side effects?: Yes    Problem Solving:  Problem Solving Assessed Today: Yes  Is the patient at risk for hypoglycemia?: Yes  Hypoglycemia Frequency: Weekly  Hypoglycemia Treatment: Juice  Patient carries a carbohydrate source: Yes  Is the patient at risk for DKA?: No  Does patient have severe weather/disaster plan for diabetes management?: No  Does patient have sick day plan for diabetes management?: No    Hypoglycemia symptoms  Sweats: Yes  Feeling shaky: Yes    Hypoglycemia Complications  Nocturnal hypoglycemia: Yes    Reducing Risks:  Reducing Risks Assessed Today: No    Healthy Coping:  Healthy Coping Assessed Today: Yes  Emotional response to diabetes: Ready to learn  Stage of change: PREPARATION (Decided to change - considering how)  Support resources: None  Patient Activation Measure Survey Score:  No flowsheet data found.      Care Plan and  Education Provided:  Care Plan: Diabetes   Updates made by Andreea Robertson RD since 11/21/2022 12:00 AM      Problem: Diabetes Self-Management Education Needed to Optimize Self-Care Behaviors       Goal: Monitoring - monitor glucose and ketones as directed    This Visit's Progress: 100%   Recent Progress: 0%   Note:    I will wear the LibrePro continuous glucose monitor x 14 days and take notes on what I eat, activity & the medicine I take for my diabetes in this time.      Task: Provide education on blood glucose monitoring (purpose, proper technique, frequency, glucose targets, interpreting results, when to use glucose control solution, sharps disposal) Completed 11/21/2022   Responsible User: Andreea Robertson RD      Goal: Problem Solving - know how to prevent and manage short-term diabetes complications       Task: Provide education on high blood glucose - causes, signs/symptoms, prevention and treatment Completed 11/21/2022   Responsible User: Andreea Robertson RD Elise Graham, RD, LD, [facility]     Time Spent: 30 minutes  Encounter Type: Individual    Any diabetes medication dose changes were made via the CDE Protocol per the patient's endocrinology provider. A copy of this encounter was shared with the provider.

## 2022-11-21 NOTE — LETTER
11/21/2022         RE: Anahi Gramajo  [address]  [address]  [facility] [address]        Dear Colleague,    Thank you for referring your patient, Anahi Gramajo, to the [facility] [place]. Please see a copy of my visit note below.    Diabetes Self-Management Education & Support    Presents for: CGM Review    Type of Service: In Person Visit    Assessment Type:   REPORTS:                  Pt verbalized understanding of concepts discussed and recommendations provided today.       Continue education with the following diabetes management concepts: Monitoring, Taking Medication, Problem Solving, Reducing Risks and Healthy Coping    ASSESSMENT:  Anahi has restarted her glimepiride, still not taking full dose off metformin and no change to insulin dosing. Consistent trend in hyperglycemia post-meals every day and numbers stay high all  Afternoon/evening. Reviewed need for more  Medication to help with this. Patient unwilling to add another injection of NPH in AM. She is will to try and take glimepiride and 2nd tablet of metformin during the day with food (same recs as last visit). She has cut out sugary beverages since last visit. Discussed use of metformin XR and she would prefer to try and take 1 tablet with first meal of day and 2nd tablet at HS. Reviewed low blood sugar and potential need for less insulin - will instead adjust glimepiride dose to take with food & during the day, rather than at bedtime.     Glucose Patterns & Trends:  Hyperglycemia, weekend- postmeal and weekday- postmeal and Hypoglycemia, weekend- nocturnal      PLAN  Take 2 tablets (2000 mg) of metformin daily - 1 at noon and the other at bedtime   Switch the glimepiride to mid-day with your bigger meal (noon-3p)  Consider another LibrePro study in 2-3 months  Get A1C rechecked, per Endocrinology     Topics to cover at upcoming visits: Monitoring, Taking Medication, Problem Solving, Reducing Risks and Healthy  "Coping    Follow-up: as needed, consider LibrePro study in February     See Care Plan for co-developed, patient-state behavior change goals.  AVS provided for patient today.    Education Materials Provided:  Moodyiew printout       SUBJECTIVE/OBJECTIVE:  Presents for: CGM Review  Accompanied by: Self  Diabetes education in the past 24mo: Yes  Focus of Visit: CGM  Type of CGM visit: Professional CGM  Diabetes type: Type 2  Date of diagnosis: 2009  How confident are you filling out medical forms by yourself:: Not Assessed  Diabetes management related comments/concerns: Restarted glimepiride over the past few days, taking at bedtime, had 1 low BG with symptoms that she treated with juice  Transportation concerns: No  Difficulty affording diabetes medication?: Yes  Difficulty affording diabetes testing supplies?: Yes  Other concerns:: None  Cultural Influences/Ethnic Background:  Choose not to answer      Diabetes Symptoms & Complications:  Weight trend: Decreasing  Complications assessed today?: No    Patient Problem List and Family Medical History reviewed for relevant medical history, current medical status, and diabetes risk factors.    Vitals:  Wt 106.1 kg (233 lb 14.4 oz)   BMI 42.78 kg/m    Estimated body mass index is 42.78 kg/m  as calculated from the following:    Height as of 1/31/20: 1.575 m (5' 2\").    Weight as of this encounter: 106.1 kg (233 lb 14.4 oz).   Last 3 BP:   BP Readings from Last 3 Encounters:   07/11/22 112/67   08/09/21 132/80   01/31/20 (!) 164/80       History   Smoking Status     Former   Smokeless Tobacco     Never       Labs:  Lab Results   Component Value Date    A1C 5.4 08/09/2021    A1C 7.4 01/29/2021     Lab Results   Component Value Date     08/09/2021     01/29/2021     Lab Results   Component Value Date     08/09/2021    LDL 91 01/29/2021     HDL Cholesterol   Date Value Ref Range Status   01/29/2021 46 (L) >49 mg/dL Final     Direct Measure HDL   Date " Value Ref Range Status   08/09/2021 44 (L) >=50 mg/dL Final     Comment:     0-19 years:       Greater than or equal to 45 mg/dL   Low: Less than 40 mg/dL   Borderline low: 40-44 mg/dL     20 years and older:   Female: Greater than or equal to 50 mg/dL   Male:   Greater than or equal to 40 mg/dL        ]  GFR Estimate   Date Value Ref Range Status   08/09/2021 53 (L) >60 mL/min/1.73m2 Final     Comment:     As of July 11, 2021, eGFR is calculated by the CKD-EPI creatinine equation, without race adjustment. eGFR can be influenced by muscle mass, exercise, and diet. The reported eGFR is an estimation only and is only applicable if the renal function is stable.   01/29/2021 60 (L) >60 mL/min/[1.73_m2] Final     Comment:     Non  GFR Calc  Starting 12/18/2018, serum creatinine based estimated GFR (eGFR) will be   calculated using the Chronic Kidney Disease Epidemiology Collaboration   (CKD-EPI) equation.       GFR Estimate If Black   Date Value Ref Range Status   01/29/2021 70 >60 mL/min/[1.73_m2] Final     Comment:      GFR Calc  Starting 12/18/2018, serum creatinine based estimated GFR (eGFR) will be   calculated using the Chronic Kidney Disease Epidemiology Collaboration   (CKD-EPI) equation.       Lab Results   Component Value Date    CR 1.04 08/09/2021    CR 0.94 01/29/2021     No results found for: MICROALBUMIN    Healthy Eating:  Healthy Eating Assessed Today: Yes  Cultural/Denominational diet restrictions?: No  Meal planning/habits: Keeps food records  Meals include: Lunch, Dinner, Afternoon Snack, Evening Snack  Breakfast: skips or glass of OJ  Lunch: sandwich and crackers maybe (triscuits or wheat thins)  Dinner: burger OR chicken OR stouffers frozen meal   Snacks: sweet tooth in the afternoon and will eat whatever is around and might have popcorn after dinner or slice of cheese or chex mix  Beverages: Milk, Coffee, Water, Diet soda, Juice  Has patient met with a dietitian in the  past?: Yes    Being Active:  Being Active Assessed Today: Yes  Exercise:: Currently not exercising  Barrier to exercise: Physical limitation    Monitoring:  Monitoring Assessed Today: Yes  Did patient bring glucose meter to appointment? : No  Blood Glucose Meter: Unknown  Times checking blood sugar at home (number): Never    See above.     Taking Medications:  Diabetes Medication(s)     Biguanides       metFORMIN (GLUCOPHAGE) 1000 MG tablet    Take 1 tablet (1,000 mg) by mouth 2 times daily (with meals)     Patient taking differently: Take 1,000 mg by mouth At Bedtime    Insulin       insulin NPH (NOVOLIN N RELION) 100 UNIT/ML vial    INJECT 50-54 UNITS SUBCUTANEOUS 2 TIMES DAILY AS DIRECTED, TOTAL DAILY DOSE APPROX 104 UNITS     Patient taking differently: Inject 40 Units Subcutaneous At Bedtime INJECT 50-54 UNITS SUBCUTANEOUS 2 TIMES DAILY AS DIRECTED, TOTAL DAILY DOSE APPROX 104 UNITS    Sulfonylureas       glimepiride (AMARYL) 2 MG tablet    Take 1.5 tablets (3 mg) by mouth daily as directed     Patient taking differently: Take 2 mg by mouth At Bedtime Take 1.5 tablets (3 mg) by mouth daily as directed          Taking Medication Assessed Today: Yes  Current Treatments: Insulin Injections, Oral Medication (taken by mouth)  Dose schedule: At bedtime  Given by: Patient  Problems taking diabetes medications regularly?: Yes  Diabetes medication side effects?: Yes    Problem Solving:  Problem Solving Assessed Today: Yes  Is the patient at risk for hypoglycemia?: Yes  Hypoglycemia Frequency: Weekly  Hypoglycemia Treatment: Juice  Patient carries a carbohydrate source: Yes  Is the patient at risk for DKA?: No  Does patient have severe weather/disaster plan for diabetes management?: No  Does patient have sick day plan for diabetes management?: No    Hypoglycemia symptoms  Sweats: Yes  Feeling shaky: Yes    Hypoglycemia Complications  Nocturnal hypoglycemia: Yes    Reducing Risks:  Reducing Risks Assessed Today:  No    Healthy Coping:  Healthy Coping Assessed Today: Yes  Emotional response to diabetes: Ready to learn  Stage of change: PREPARATION (Decided to change - considering how)  Support resources: None  Patient Activation Measure Survey Score:  No flowsheet data found.      Care Plan and Education Provided:  Care Plan: Diabetes   Updates made by Andreea Robertson RD since 11/21/2022 12:00 AM      Problem: Diabetes Self-Management Education Needed to Optimize Self-Care Behaviors       Goal: Monitoring - monitor glucose and ketones as directed    This Visit's Progress: 100%   Recent Progress: 0%   Note:    I will wear the LibrePro continuous glucose monitor x 14 days and take notes on what I eat, activity & the medicine I take for my diabetes in this time.      Task: Provide education on blood glucose monitoring (purpose, proper technique, frequency, glucose targets, interpreting results, when to use glucose control solution, sharps disposal) Completed 11/21/2022   Responsible User: Andreea Robertson RD      Goal: Problem Solving - know how to prevent and manage short-term diabetes complications       Task: Provide education on high blood glucose - causes, signs/symptoms, prevention and treatment Completed 11/21/2022   Responsible User: Andreea Robertson RD Elise Graham, RD, LD, [facility]     Time Spent: 30 minutes  Encounter Type: Individual    Any diabetes medication dose changes were made via the CDE Protocol per the patient's endocrinology provider. A copy of this encounter was shared with the provider.

## 2022-11-21 NOTE — PATIENT INSTRUCTIONS
- Take 2 tablets (2000 mg) of metformin daily - 1 at noon and the other at bedtime   - Switch the glimepiride to mid-day with your bigger meal (noon-3p)  - Continue your NPH insulin at bedtime at the same dose (38 units)  - Let me know if you want to do another LibrePro study in the new year, call our appointment line below     Call or send a Reach[employer] message with any questions or concerns    Andreea Robertson RD, LD, [facility]   Diabetes Education Triage Line: [phone]  Diabetes Education Appointment [phone]

## 2022-12-27 DIAGNOSIS — Z79.4 TYPE 2 DIABETES MELLITUS WITHOUT COMPLICATION, WITH LONG-TERM CURRENT USE OF INSULIN (H): ICD-10-CM

## 2022-12-27 DIAGNOSIS — E11.9 TYPE 2 DIABETES MELLITUS WITHOUT COMPLICATION, WITH LONG-TERM CURRENT USE OF INSULIN (H): ICD-10-CM

## 2022-12-27 RX ORDER — HUMAN INSULIN 100 [IU]/ML
INJECTION, SUSPENSION SUBCUTANEOUS
Qty: 40 ML | Refills: 0 | Status: SHIPPED | OUTPATIENT
Start: 2022-12-27 | End: 2023-03-23

## 2022-12-27 NOTE — TELEPHONE ENCOUNTER
"Last Written Prescription Date:  3/4/22  Last Fill Quantity: 30,  # refills: 2   Last office visit: 7/11/2022 with prescribing provider:Dr. Goldstein  Future Office Visit: 2/6/23         Requested Prescriptions   Pending Prescriptions Disp Refills     insulin NPH (NOVOLIN N RELION) 100 UNIT/ML vial [Pharmacy Med Name: NovoLIN N ReliOn 100 UNIT/ML Subcutaneous Suspension] 40 mL 0     Sig: INJECT 50-54 UNITS SUBCUTANEOUSLY TWICE DAILY AS DIRECTED . TOTAL DAILY DOSE APPROXIMATELY 104 UNITS       Intermediate Acting Insulin Protocol Failed - 12/27/2022  1:12 PM        Failed - Serum creatinine on file in past 12 months     Recent Labs   Lab Test 08/09/21  1124   CR 1.04       Ok to refill medication if creatinine is low          Passed - HgbA1C in past 3 or 6 months     If HgbA1C is 8 or greater, it needs to be on file within the past 3 months.  If less than 8, must be on file within the past 6 months.     Recent Labs   Lab Test 11/21/22  1326   A1C 8.5*             Passed - Medication is active on med list        Passed - Patient is age 18 or older        Passed - Recent (6 mo) or future (30 days) visit within the authorizing provider's specialty     Patient had office visit in the last 6 months or has a visit in the next 30 days with authorizing provider or within the authorizing provider's specialty.  See \"Patient Info\" tab in inbasket, or \"Choose Columns\" in Meds & Orders section of the refill encounter.             Refills sent  Mita Humphreys RN      "

## 2023-02-06 ENCOUNTER — VIRTUAL VISIT (OUTPATIENT)
Dept: ENDOCRINOLOGY | Facility: CLINIC | Age: 78
End: 2023-02-06
Payer: MEDICARE

## 2023-02-06 DIAGNOSIS — Z79.4 TYPE 2 DIABETES MELLITUS WITH MICROALBUMINURIA, WITH LONG-TERM CURRENT USE OF INSULIN (H): Primary | ICD-10-CM

## 2023-02-06 DIAGNOSIS — R80.9 TYPE 2 DIABETES MELLITUS WITH MICROALBUMINURIA, WITH LONG-TERM CURRENT USE OF INSULIN (H): Primary | ICD-10-CM

## 2023-02-06 DIAGNOSIS — E11.29 TYPE 2 DIABETES MELLITUS WITH MICROALBUMINURIA, WITH LONG-TERM CURRENT USE OF INSULIN (H): Primary | ICD-10-CM

## 2023-02-06 PROCEDURE — 99214 OFFICE O/P EST MOD 30 MIN: CPT | Mod: 95 | Performed by: INTERNAL MEDICINE

## 2023-02-06 RX ORDER — SERTRALINE HYDROCHLORIDE 100 MG/1
100 TABLET, FILM COATED ORAL AT BEDTIME
COMMUNITY
Start: 2023-01-30

## 2023-02-06 NOTE — LETTER
2/6/2023         RE: Anahi Gramajo  [address]  [address]  [place] [facility] [place] [address]        Dear Colleague,    Thank you for referring your patient, Anahi Gramajo, to the [facility]. Please see a copy of my visit note below.    Patient is being evaluated via a billable video visit.      How would you like to obtain your AVS? Verbally Reviewed  If the video visit is dropped, the invitation should be resent by: Cellphone  Will anyone else be joining your video visit? No        Video-Visit Details    Video Start Time: 8:05 am    Type of service:  Video Visit    Video End Time:  8:20 am    Originating Location (pt. Location):  Home    PROVIDER LOCATION On-site vs Off-site    Distant Location (provider location):  Home    Platform used for Video Visit: Jeannette        Recent issues:  Diabetes follow-up evaluation, last appt 7/11/22  Reports having mobility issues, due to knee arthritis and weak legs  Reports good FBG levels overall             Previous history of T2DM, diagnosed ~age 62  Recalls taking oral DM meds, including metformin  Had seen Dr. AMITA Flood/Herb, also Dr. RAMSES Daniel  ~2012 Began use of daily insulin  5/2019. Changed insulin from Levemir to Relion NPH              Initial dosing NPH 50U BID  Fall 2019. Patient concerned with low FBG, decided to stop the morning NPH insulin use    Current DM meds:   Metformin 1000 mg     1-tab in evening  glimeparide 2 mg        1-tab in evening  Relion NPH                 Subcutaneous 39U in evening     Uses Accucheck Guide meter   FBG  mg/dl per patient       Recent [facility] labs include:         Previous [facility] labs include:       Recent [facility] labs include:          Lab Results   Component Value Date    A1C 8.5 (H) 11/21/2022     08/09/2021    POTASSIUM 4.9 08/09/2021    CHLORIDE 105 08/09/2021    CO2 29 08/09/2021    ANIONGAP 4 08/09/2021     (H) 08/09/2021    BUN 21 08/09/2021    CR 1.04 08/09/2021    GFRESTIMATED 53 (L)  08/09/2021    GFRESTBLACK 70 01/29/2021    LESTER 9.9 08/09/2021    CHOL 210 (H) 08/09/2021    TRIG 204 (H) 08/09/2021    HDL 44 (L) 08/09/2021     (H) 08/09/2021    NHDL 166 (H) 08/09/2021    UCRR 192 08/09/2021    MICROL 28 08/09/2021    UMALCR 14.58 08/09/2021     Lab Results   Component Value Date    TSH 2.14 08/09/2021     [facility] no DR noted  DM complications:                Nephropathy:                          Microalbuminuria        Lives in [facility]  She sees Dr. Keila Stevens/[facility] for gen med evaluations     PMH/PSH:  Past Medical History:   Diagnosis Date     Asthma      Depression      Hyperlipidemia      Labile blood pressure      Obesity      Type 2 diabetes mellitus (H)      No past surgical history on file.    Family Hx:  No family history on file.      Social Hx:  Social History     Socioeconomic History     Marital status:      Spouse name: Not on file     Number of children: Not on file     Years of education: Not on file     Highest education level: Not on file   Occupational History     Not on file   Tobacco Use     Smoking status: Former     Smokeless tobacco: Never   Substance and Sexual Activity     Alcohol use: No     Drug use: No     Sexual activity: Never     Partners: Male   Other Topics Concern     Not on file   Social History Narrative     Not on file     Social Determinants of Health     Financial Resource Strain: Not on file   Food Insecurity: Not on file   Transportation Needs: Not on file   Physical Activity: Not on file   Stress: Not on file   Social Connections: Not on file   Intimate Partner Violence: Not on file   Housing Stability: Not on file          MEDICATIONS:  has a current medication list which includes the following prescription(s): albuterol, alprazolam, aspirin, atorvastatin, glimepiride, ibuprofen, novolin n relion, lisinopril, metformin, omeprazole, sertraline, accu-chek guide, budesonide-formoterol,  vitamin d3, insulin syringe-needle u-100, multivitamin, and vitamin b-12.     ROS: 10 point ROS neg other than the symptoms noted above in the HPI.     GENERAL: mild fatigue, wt stable; fevers, chills, malaise, night sweats.   HEENT: no dysphagia, odonophagia, diplopia, neck pain or tenderness  THYROID:  no apparent hyper or hypothyroid symptoms  CV:  Denies chest pain, pressure or palpitations  LUNGS:  Denies wheezing, cough, SOB, PEREZ  ABDOMEN: no diarrhea, constipation, abdominal pain  EXTREMITIES: ankle swelling; no rashes, ulcers  NEUROLOGY: no changes in vision, tingling or numbness in hands or feet.   MSK: pains at knees, low back, and right hip pains denies muscle weakness  SKIN: no rashes or lesions  : nocturia 1-2x, no menses; no hot flashes or night sweats  ENDOCRINE: no heat or cold intolerance      Physical Exam (visual exam)  VS:  no vital signs taken for video visit  CONSTITUTIONAL: healthy, alert and NAD, well dressed, answering questions appropriately  ENT: no nose swelling or nasal discharge, mouth redness or gum changes.  EYES: eyes grossly normal to inspection, conjunctivae and sclerae normal, no exophthalmos or proptosis  THYROID:  no apparent nodules or goiter  LUNGS: no audible wheeze, cough or visible cyanosis, no visible retractions or increased work of breathing  ABDOMEN: abdomen not evaluated  EXTREMITIES: no hand tremors, limited exam  NEUROLOGY: CN grossly intact, mentation intact and speech normal   SKIN:  no apparent skin lesions, rash, or edema with visualized skin appearance  PSYCH: mentation appears normal, affect normal/bright, judgement and insight intact,   normal speech and appearance well groomed       LABS:     All pertinent notes, labs, and images personally reviewed by me.        A/P:  Encounter Diagnosis   Name Primary?     Type 2 diabetes mellitus with microalbuminuria, with long-term current use of insulin (H) Yes       Comments:  Reviewed health history and overall  diabetes issues.  Reviewed and interpreted tests that I previously ordered.   Ordered appropriate tests for the endocrinology disease management.    Management options discussed and implemented after shared medical decision making with the patient.  T2DM problem is chronic-stable    Plan:  Discussed general issues with the diabetes mellitus diagnosis and management  We discussed the gawlmuqjaqC1n test which reflects previous overall glucose levels or control  Reviewed the importance of blood glucose (BG) testing to assess glucose trends  Provided general overview of diabetes (oral and injectable) medication use     Recommended:  Continue current Metformin, glimeparide, and NPH insulin medications  Shift the metformin and glimeparide meds to morning dosing:  Metformin 1000 mg     1-tab in morning  glimeparide 2 mg        1-tab in morning  Relion NPH                 Subcutaneous 40U in evening    Check BGM levels fasting, presupper and bedtime for several days  Goal target  mg/dl  Message me with these BGM data for feedback  Consider wearing CGM sensor to assess glucose trends  Check preappt labs in 5/2023    Discussed option of nearby [facility] or [facility]   Lab orders placed  Keep focus on diet, exercise, and weight management  Continue atorvastatin 40 mg po QD dose plan  Advise having fasting lipid panel testing and dilated eye examination, at least annually  Will summarize lab results when available     See PCP about the ankle swelling, possible vascular testing with duplex U/S and/or BHARATHI procedure  Addressed patient questions today    There are no Patient Instructions on file for this visit.    Future labs ordered today:   Orders Placed This Encounter   Procedures     Hemoglobin A1c     Basic metabolic panel     ALT     Radiology/Consults ordered today: None    Total time spent on day of encounter:  31 min    Follow-up:  5/16/23 at 11:30 am, Return    DAMION Goldstein MD,  MS  Endocrinology  [facility]                    Again, thank you for allowing me to participate in the care of your patient.        Sincerely,        Marco Goldstein MD

## 2023-02-06 NOTE — PROGRESS NOTES
Patient is being evaluated via a billable video visit.      How would you like to obtain your AVS? Verbally Reviewed  If the video visit is dropped, the invitation should be resent by: Cellphone  Will anyone else be joining your video visit? No        Video-Visit Details    Video Start Time: 8:05 am    Type of service:  Video Visit    Video End Time:  8:20 am    Originating Location (pt. Location):  Home    PROVIDER LOCATION On-site vs Off-site    Distant Location (provider location):  Home    Platform used for Video Visit: [email or web address]        Recent issues:  Diabetes follow-up evaluation, last appt 7/11/22  Reports having mobility issues, due to knee arthritis and weak legs  Reports good FBG levels overall             Previous history of T2DM, diagnosed ~age 62  Recalls taking oral DM meds, including metformin  Had seen Dr. AMITA Flood/Herb, also Dr. RAMSES Daniel  ~2012 Began use of daily insulin  5/2019. Changed insulin from Levemir to Relion NPH              Initial dosing NPH 50U BID  Fall 2019. Patient concerned with low FBG, decided to stop the morning NPH insulin use    Current DM meds:   Metformin 1000 mg     1-tab in evening  glimeparide 2 mg        1-tab in evening  Relion NPH                 Subcutaneous 39U in evening     Uses Accucheck Guide meter   FBG  mg/dl per patient       Recent HP labs include:         Previous FV labs include:       Recent FV labs include:          Lab Results   Component Value Date    A1C 8.5 (H) 11/21/2022     08/09/2021    POTASSIUM 4.9 08/09/2021    CHLORIDE 105 08/09/2021    CO2 29 08/09/2021    ANIONGAP 4 08/09/2021     (H) 08/09/2021    BUN 21 08/09/2021    CR 1.04 08/09/2021    GFRESTIMATED 53 (L) 08/09/2021    GFRESTBLACK 70 01/29/2021    LESTER 9.9 08/09/2021    CHOL 210 (H) 08/09/2021    TRIG 204 (H) 08/09/2021    HDL 44 (L) 08/09/2021     (H) 08/09/2021    NHDL 166 (H) 08/09/2021    UCRR 192 08/09/2021    MICROL 28 08/09/2021    UMALCR 14.58 08/09/2021      Lab Results   Component Value Date    TSH 2.14 08/09/2021     [facility] no DR noted  DM complications:                Nephropathy:                          Microalbuminuria        Lives in [facility]  She sees Dr. Keila Stevens/[facility] for gen med evaluations     PMH/PSH:  Past Medical History:   Diagnosis Date     Asthma      Depression      Hyperlipidemia      Labile blood pressure      Obesity      Type 2 diabetes mellitus (H)      No past surgical history on file.    Family Hx:  No family history on file.      Social Hx:  Social History     Socioeconomic History     Marital status:      Spouse name: Not on file     Number of children: Not on file     Years of education: Not on file     Highest education level: Not on file   Occupational History     Not on file   Tobacco Use     Smoking status: Former     Smokeless tobacco: Never   Substance and Sexual Activity     Alcohol use: No     Drug use: No     Sexual activity: Never     Partners: Male   Other Topics Concern     Not on file   Social History Narrative     Not on file     Social Determinants of Health     Financial Resource Strain: Not on file   Food Insecurity: Not on file   Transportation Needs: Not on file   Physical Activity: Not on file   Stress: Not on file   Social Connections: Not on file   Intimate Partner Violence: Not on file   Housing Stability: Not on file          MEDICATIONS:  has a current medication list which includes the following prescription(s): albuterol, alprazolam, aspirin, atorvastatin, glimepiride, ibuprofen, novolin n relion, lisinopril, metformin, omeprazole, sertraline, accu-chek guide, budesonide-formoterol, vitamin d3, insulin syringe-needle u-100, multivitamin, and vitamin b-12.     ROS: 10 point ROS neg other than the symptoms noted above in the HPI.     GENERAL: mild fatigue, wt stable; fevers, chills, malaise, night sweats.   HEENT: no dysphagia, odonophagia, diplopia,  neck pain or tenderness  THYROID:  no apparent hyper or hypothyroid symptoms  CV:  Denies chest pain, pressure or palpitations  LUNGS:  Denies wheezing, cough, SOB, PEREZ  ABDOMEN: no diarrhea, constipation, abdominal pain  EXTREMITIES: ankle swelling; no rashes, ulcers  NEUROLOGY: no changes in vision, tingling or numbness in hands or feet.   MSK: pains at knees, low back, and right hip pains denies muscle weakness  SKIN: no rashes or lesions  : nocturia 1-2x, no menses; no hot flashes or night sweats  ENDOCRINE: no heat or cold intolerance      Physical Exam (visual exam)  VS:  no vital signs taken for video visit  CONSTITUTIONAL: healthy, alert and NAD, well dressed, answering questions appropriately  ENT: no nose swelling or nasal discharge, mouth redness or gum changes.  EYES: eyes grossly normal to inspection, conjunctivae and sclerae normal, no exophthalmos or proptosis  THYROID:  no apparent nodules or goiter  LUNGS: no audible wheeze, cough or visible cyanosis, no visible retractions or increased work of breathing  ABDOMEN: abdomen not evaluated  EXTREMITIES: no hand tremors, limited exam  NEUROLOGY: CN grossly intact, mentation intact and speech normal   SKIN:  no apparent skin lesions, rash, or edema with visualized skin appearance  PSYCH: mentation appears normal, affect normal/bright, judgement and insight intact,   normal speech and appearance well groomed       LABS:     All pertinent notes, labs, and images personally reviewed by me.        A/P:  Encounter Diagnosis   Name Primary?     Type 2 diabetes mellitus with microalbuminuria, with long-term current use of insulin (H) Yes       Comments:  Reviewed health history and overall diabetes issues.  Reviewed and interpreted tests that I previously ordered.   Ordered appropriate tests for the endocrinology disease management.    Management options discussed and implemented after shared medical decision making with the patient.  T2DM problem is  chronic-stable    Plan:  Discussed general issues with the diabetes mellitus diagnosis and management  We discussed the [id number] test which reflects previous overall glucose levels or control  Reviewed the importance of blood glucose (BG) testing to assess glucose trends  Provided general overview of diabetes (oral and injectable) medication use     Recommended:  Continue current Metformin, glimeparide, and NPH insulin medications  Shift the metformin and glimeparide meds to morning dosing:  Metformin 1000 mg     1-tab in morning  glimeparide 2 mg        1-tab in morning  Relion NPH                 Subcutaneous 40U in evening    Check BGM levels fasting, presupper and bedtime for several days  Goal target  mg/dl  Message me with these BGM data for feedback  Consider wearing CGM sensor to assess glucose trends  Check preappt labs in 5/2023    Discussed option of nearby [facility] or her [facility]   Lab orders placed  Keep focus on diet, exercise, and weight management  Continue atorvastatin 40 mg po QD dose plan  Advise having fasting lipid panel testing and dilated eye examination, at least annually  Will summarize lab results when available     See PCP about the ankle swelling, possible vascular testing with duplex U/S and/or BHARATHI procedure  Addressed patient questions today    There are no Patient Instructions on file for this visit.    Future labs ordered today:   Orders Placed This Encounter   Procedures     Hemoglobin A1c     Basic metabolic panel     ALT     Radiology/Consults ordered today: None    Total time spent on day of encounter:  31 min    Follow-up:  5/16/23 at 11:30 am, Return    DAMION Goldstein MD, MS  Endocrinology  [facility]

## 2023-03-23 DIAGNOSIS — Z79.4 TYPE 2 DIABETES MELLITUS WITHOUT COMPLICATION, WITH LONG-TERM CURRENT USE OF INSULIN (H): ICD-10-CM

## 2023-03-23 DIAGNOSIS — E11.9 TYPE 2 DIABETES MELLITUS WITHOUT COMPLICATION, WITH LONG-TERM CURRENT USE OF INSULIN (H): ICD-10-CM

## 2023-03-23 RX ORDER — HUMAN INSULIN 100 [IU]/ML
INJECTION, SUSPENSION SUBCUTANEOUS
Qty: 40 ML | Refills: 0 | Status: SHIPPED | OUTPATIENT
Start: 2023-03-23 | End: 2023-06-22

## 2023-03-23 NOTE — TELEPHONE ENCOUNTER
"Last Written Prescription Date: 12/27/22  Last Fill Quantity: 40,  # refills: 0   Last office visit: 2/6/23 with prescribing provider:  Dr. Goldstein   Future Office Visit: 5/16/23         Requested Prescriptions   Pending Prescriptions Disp Refills     insulin NPH (NOVOLIN N RELION) 100 UNIT/ML vial [Pharmacy Med Name: NovoLIN N ReliOn 100 UNIT/ML Subcutaneous Suspension] 40 mL 0     Sig: INJECT 50 TO 54 UNITS SUBCUTANEOUSLY TWICE DAILY AS DIRECTED .  TOTAL  DAILY  DOSE  APPROXIMATELY  104  UNITS       Intermediate Acting Insulin Protocol Failed - 3/23/2023  8:03 AM        Failed - Serum creatinine on file in past 12 months     Recent Labs   Lab Test 08/09/21  1124   CR 1.04       Ok to refill medication if creatinine is low          Failed - HgbA1C in past 3 or 6 months     If HgbA1C is 8 or greater, it needs to be on file within the past 3 months.  If less than 8, must be on file within the past 6 months.     Recent Labs   Lab Test 11/21/22  1326   A1C 8.5*             Passed - Medication is active on med list        Passed - Patient is age 18 or older        Passed - Recent (6 mo) or future (30 days) visit within the authorizing provider's specialty     Patient had office visit in the last 6 months or has a visit in the next 30 days with authorizing provider or within the authorizing provider's specialty.  See \"Patient Info\" tab in inbasket, or \"Choose Columns\" in Meds & Orders section of the refill encounter.               Refills sent  Mita Humphreys RN    "

## 2023-04-01 ENCOUNTER — HEALTH MAINTENANCE LETTER (OUTPATIENT)
Age: 78
End: 2023-04-01

## 2023-04-24 DIAGNOSIS — E78.5 HYPERLIPIDEMIA LDL GOAL <100: ICD-10-CM

## 2023-04-24 NOTE — TELEPHONE ENCOUNTER
Last Written Prescription Date:  10/18/2022  Last Fill Quantity: 90,  # refills: 1   Last office visit: 7/11/2022 ; last virtual visit: 2/6/2023  Future Office Visit:

## 2023-04-25 RX ORDER — ATORVASTATIN CALCIUM 40 MG/1
40 TABLET, FILM COATED ORAL DAILY
Qty: 90 TABLET | Refills: 1 | Status: SHIPPED | OUTPATIENT
Start: 2023-04-25 | End: 2023-10-17

## 2023-04-25 NOTE — TELEPHONE ENCOUNTER
"Requested Prescriptions   Pending Prescriptions Disp Refills     atorvastatin (LIPITOR) 40 MG tablet 90 tablet 1     Sig: Take 1 tablet (40 mg) by mouth daily       Statins Protocol Failed - 4/24/2023  3:16 PM        Failed - LDL on file in past 12 months     Recent Labs   Lab Test 08/09/21  1124   *             Passed - No abnormal creatine kinase in past 12 months     No lab results found.             Passed - Recent (12 mo) or future (30 days) visit within the authorizing provider's specialty     Patient has had an office visit with the authorizing provider or a provider within the authorizing providers department within the previous 12 mos or has a future within next 30 days. See \"Patient Info\" tab in inbasket, or \"Choose Columns\" in Meds & Orders section of the refill encounter.              Passed - Medication is active on med list        Passed - Patient is age 18 or older        Passed - No active pregnancy on record        Passed - No positive pregnancy test in past 12 months           Next appt: 5/16/23  Refills sent  Mita Humphreys RN    "

## 2023-05-04 ENCOUNTER — LAB (OUTPATIENT)
Dept: LAB | Facility: CLINIC | Age: 78
End: 2023-05-04
Payer: MEDICARE

## 2023-05-04 DIAGNOSIS — E11.29 TYPE 2 DIABETES MELLITUS WITH MICROALBUMINURIA, WITH LONG-TERM CURRENT USE OF INSULIN (H): ICD-10-CM

## 2023-05-04 DIAGNOSIS — R80.9 TYPE 2 DIABETES MELLITUS WITH MICROALBUMINURIA, WITH LONG-TERM CURRENT USE OF INSULIN (H): ICD-10-CM

## 2023-05-04 DIAGNOSIS — Z79.4 TYPE 2 DIABETES MELLITUS WITH MICROALBUMINURIA, WITH LONG-TERM CURRENT USE OF INSULIN (H): ICD-10-CM

## 2023-05-04 LAB
ALT SERPL W P-5'-P-CCNC: 17 U/L (ref 10–35)
ANION GAP SERPL CALCULATED.3IONS-SCNC: 12 MMOL/L (ref 7–15)
BUN SERPL-MCNC: 19 MG/DL (ref 8–23)
CALCIUM SERPL-MCNC: 10.1 MG/DL (ref 8.8–10.2)
CHLORIDE SERPL-SCNC: 102 MMOL/L (ref 98–107)
CREAT SERPL-MCNC: 0.93 MG/DL (ref 0.51–0.95)
DEPRECATED HCO3 PLAS-SCNC: 26 MMOL/L (ref 22–29)
GFR SERPL CREATININE-BSD FRML MDRD: 63 ML/MIN/1.73M2
GLUCOSE SERPL-MCNC: 172 MG/DL (ref 70–99)
HBA1C MFR BLD: 9.2 % (ref 0–5.6)
POTASSIUM SERPL-SCNC: 4.9 MMOL/L (ref 3.4–5.3)
SODIUM SERPL-SCNC: 140 MMOL/L (ref 136–145)

## 2023-05-04 PROCEDURE — 84460 ALANINE AMINO (ALT) (SGPT): CPT

## 2023-05-04 PROCEDURE — 83036 HEMOGLOBIN GLYCOSYLATED A1C: CPT

## 2023-05-04 PROCEDURE — 80048 BASIC METABOLIC PNL TOTAL CA: CPT

## 2023-05-04 PROCEDURE — 36415 COLL VENOUS BLD VENIPUNCTURE: CPT

## 2023-05-16 ENCOUNTER — OFFICE VISIT (OUTPATIENT)
Dept: ENDOCRINOLOGY | Facility: CLINIC | Age: 78
End: 2023-05-16
Payer: MEDICARE

## 2023-05-16 VITALS
BODY MASS INDEX: 43.59 KG/M2 | DIASTOLIC BLOOD PRESSURE: 84 MMHG | WEIGHT: 238.3 LBS | SYSTOLIC BLOOD PRESSURE: 124 MMHG | HEART RATE: 90 BPM

## 2023-05-16 DIAGNOSIS — Z79.4 TYPE 2 DIABETES MELLITUS WITH MICROALBUMINURIA, WITH LONG-TERM CURRENT USE OF INSULIN (H): Primary | ICD-10-CM

## 2023-05-16 DIAGNOSIS — E11.29 TYPE 2 DIABETES MELLITUS WITH MICROALBUMINURIA, WITH LONG-TERM CURRENT USE OF INSULIN (H): Primary | ICD-10-CM

## 2023-05-16 DIAGNOSIS — R80.9 TYPE 2 DIABETES MELLITUS WITH MICROALBUMINURIA, WITH LONG-TERM CURRENT USE OF INSULIN (H): Primary | ICD-10-CM

## 2023-05-16 PROCEDURE — 99213 OFFICE O/P EST LOW 20 MIN: CPT | Performed by: INTERNAL MEDICINE

## 2023-05-16 NOTE — PROGRESS NOTES
Recent issues:  Diabetes follow-up evaluation  Previous [facility] Diab Ed appt and diagnostic Otilio 11/2022  Reports having mobility issues, due to knee arthritis and weak legs  Reports good FBG levels overall but significant rise in hgbA1c levels          Previous history of T2DM, diagnosed ~age 62  Recalls taking oral DM meds, including metformin  Had seen Dr. AMITA Flood/Herb, also Dr. RAMSES Daniel  ~2012 Began use of daily insulin  5/2019. Changed insulin from Levemir to Relion NPH              Initial dosing NPH 50U BID  Fall 2019. Patient concerned with low FBG, decided to stop the morning NPH insulin use    Current DM meds:   Metformin 1000 mg     1-tab in morning  glimeparide 2 mg        1-tab in morning  Relion NPH                 Subcutaneous 40U in evening     Uses Accucheck Guide meter   FBG  mg/dl per patient       Recent HP labs include:         Previous FV labs include:       Recent FV labs include:          Lab Results   Component Value Date    A1C 9.2 (H) 05/04/2023     05/04/2023    POTASSIUM 4.9 05/04/2023    CHLORIDE 102 05/04/2023    CO2 26 05/04/2023    ANIONGAP 12 05/04/2023     (H) 05/04/2023    BUN 19.0 05/04/2023    CR 0.93 05/04/2023    GFRESTIMATED 63 05/04/2023    GFRESTBLACK 70 01/29/2021    LESTER 10.1 05/04/2023    CHOL 210 (H) 08/09/2021    TRIG 204 (H) 08/09/2021    HDL 44 (L) 08/09/2021     (H) 08/09/2021    NHDL 166 (H) 08/09/2021    UCRR 192 08/09/2021    MICROL 28 08/09/2021    UMALCR 14.58 08/09/2021     Lab Results   Component Value Date    TSH 2.14 08/09/2021     [facility], no DR noted  DM complications:                Nephropathy:                          Microalbuminuria        L[facility] in [facility]  [facility]     PMH/PSH:  Past Medical History:   Diagnosis Date     Asthma      Depression      Hyperlipidemia      Labile blood pressure      Obesity      Type 2 diabetes  mellitus (H)      No past surgical history on file.    Family Hx:  No family history on file.      Social Hx:  Social History     Socioeconomic History     Marital status:      Spouse name: Not on file     Number of children: Not on file     Years of education: Not on file     Highest education level: Not on file   Occupational History     Not on file   Tobacco Use     Smoking status: Former     Smokeless tobacco: Never   Vaping Use     Vaping status: Not on file   Substance and Sexual Activity     Alcohol use: No     Drug use: No     Sexual activity: Never     Partners: Male   Other Topics Concern     Not on file   Social History Narrative     Not on file     Social Determinants of Health     Financial Resource Strain: Not on file   Food Insecurity: Not on file   Transportation Needs: Not on file   Physical Activity: Not on file   Stress: Not on file   Social Connections: Not on file   Intimate Partner Violence: Not on file   Housing Stability: Not on file          MEDICATIONS:  has a current medication list which includes the following prescription(s): albuterol, alprazolam, aspirin, atorvastatin, glimepiride, ibuprofen, novolin n relion, lisinopril, metformin, omeprazole, semaglutide, sertraline, accu-chek guide, budesonide-formoterol, vitamin d3, insulin syringe-needle u-100, multivitamin, and vitamin b-12.     ROS: 10 point ROS neg other than the symptoms noted above in the HPI.     GENERAL: mild fatigue, wt stable; fevers, chills, malaise, night sweats.   HEENT: no dysphagia, odonophagia, diplopia, neck pain or tenderness  THYROID:  no apparent hyper or hypothyroid symptoms  CV:  Denies chest pain, pressure or palpitations  LUNGS:  Denies wheezing, cough, SOB, PEREZ  ABDOMEN: no diarrhea, constipation, abdominal pain  EXTREMITIES: ankle swelling; no rashes, ulcers  NEUROLOGY: no changes in vision, tingling or numbness in hands or feet.   MSK: pains at knees, low back, and right hip pains denies muscle  weakness  SKIN: no rashes or lesions  : nocturia 1-2x, no menses; no hot flashes or night sweats  ENDOCRINE: no heat or cold intolerance      Physical Exam   VS: /84   Pulse 90   Wt 108.1 kg (238 lb 4.8 oz)   BMI 43.59 kg/m    GENERAL: AXOX3, NAD, well dressed, answering questions appropriately, appears stated age.  ENT: no nose swelling or nasal discharge, mouth redness or gum changes.  EYES: eyes grossly normal to inspection, conjunctivae and sclerae normal, no exophthalmos or proptosis  THYROID:  no apparent nodules or goiter  LUNGS: no audible wheeze, cough or visible cyanosis, or increased work of breathing  ABDOMEN: abdomen obese size  EXTREMITIES: mild ankle edema noted  NEUROLOGY: CN grossly intact, no tremors  MSK: grossly intact  SKIN:  Superficial v.veins at feet and ankles; no apparent skin lesions, rash, or edema with visualized skin appearance  PSYCH: mentation appears normal, affect normal/bright, judgement and insight intact,   normal speech and appearance well groomed       LABS:     All pertinent notes, labs, and images personally reviewed by me.        A/P:  Encounter Diagnosis   Name Primary?     Type 2 diabetes mellitus with microalbuminuria, with long-term current use of insulin (H) Yes       Comments:  Reviewed health history and overall diabetes issues.  Reviewed and interpreted tests that I previously ordered.   Ordered appropriate tests for the endocrinology disease management.    Management options discussed and implemented after shared medical decision making with the patient.  T2DM problem is chronic-stable    Plan:  Discussed general issues with the diabetes mellitus diagnosis and management  We discussed the [id number] test which reflects previous overall glucose levels or control  Reviewed the importance of blood glucose (BG) testing to assess glucose trends  Provided general overview of diabetes (oral and injectable) medication use     Recommended:  Continue current  Metformin, glimeparide, and NPH insulin medications:  Metformin 1000 mg     1-tab in morning  glimeparide 2 mg        1-tab in morning  Relion NPH                 Subcutaneous 40U in evening    Discussed idea of adding another medication such as a GLP1-RA medication   Reviewed the Victoza, Ozempic, Trulicity, Mounjaro med options, pen dosing technique   Discussed the expected medication effects and possible SE's   Will send pen Rx to pharmacy and see if covered... start with Ozempic 0.25 mg weekly  Patient to contact me with update on the Ozempic med Rx,    since we would lower the glimeparide and NPH insulin doses when Ozempic started  Check BGM levels fasting, presupper and bedtime for several days  Goal target  mg/dl  Consider wearing CGM sensor to assess glucose trends  Check preappt labs in 5/2023    Discussed option of nearby [facility] or her [facility]   Lab orders placed  Keep focus on diet, exercise, and weight management  Continue atorvastatin 40 mg po QD dose plan  Advise having fasting lipid panel testing and dilated eye examination, at least annually     See PCP about the ankle swelling  Addressed patient questions today    There are no Patient Instructions on file for this visit.    Future labs ordered today: No orders of the defined types were placed in this encounter.    Radiology/Consults ordered today: None    Total time spent on day of encounter:  25 min    Follow-up:  9/13/23 Return    DAMION Goldstein MD, MS  Endocrinology  [facility]

## 2023-05-16 NOTE — LETTER
5/16/2023         RE: Anahi Gramajo  [address]  [address]  [place] [address]        Dear Colleague,    Thank you for referring your patient, Anahi Gramajo, to the [facility]. Please see a copy of my visit note below.      Recent issues:  Diabetes follow-up evaluation  Previous [facility] Diab Ed appt and diagnostic Otilio 11/2022  Reports having mobility issues, due to knee arthritis and weak legs  Reports good FBG levels overall but significant rise in hgbA1c levels          Previous history of T2DM, diagnosed ~age 62  Recalls taking oral DM meds, including metformin  Had seen Dr. AMITA Flood/Herb, also Dr. RAMSES Daniel  ~2012 Began use of daily insulin  5/2019. Changed insulin from Levemir to Relion NPH              Initial dosing NPH 50U BID  Fall 2019. Patient concerned with low FBG, decided to stop the morning NPH insulin use    Current DM meds:   Metformin 1000 mg     1-tab in morning  glimeparide 2 mg        1-tab in morning  Relion NPH                 Subcutaneous 40U in evening     Uses Accucheck Guide meter   FBG  mg/dl per patient       Recent [facility] labs include:         Previous [facility] labs include:       Recent [facility] labs include:          Lab Results   Component Value Date    A1C 9.2 (H) 05/04/2023     05/04/2023    POTASSIUM 4.9 05/04/2023    CHLORIDE 102 05/04/2023    CO2 26 05/04/2023    ANIONGAP 12 05/04/2023     (H) 05/04/2023    BUN 19.0 05/04/2023    CR 0.93 05/04/2023    GFRESTIMATED 63 05/04/2023    GFRESTBLACK 70 01/29/2021    LESTER 10.1 05/04/2023    CHOL 210 (H) 08/09/2021    TRIG 204 (H) 08/09/2021    HDL 44 (L) 08/09/2021     (H) 08/09/2021    NHDL 166 (H) 08/09/2021    UCRR 192 08/09/2021    MICROL 28 08/09/2021    UMALCR 14.58 08/09/2021     Lab Results   Component Value Date    TSH 2.14 08/09/2021     [facility] noted  DM  complications:                Nephropathy:                          Microalbuminuria        Lives in [facility]  She sees Dr. Keila Stevens/[facility] for gen med evaluations     PMH/PSH:  Past Medical History:   Diagnosis Date     Asthma      Depression      Hyperlipidemia      Labile blood pressure      Obesity      Type 2 diabetes mellitus (H)      No past surgical history on file.    Family Hx:  No family history on file.      Social Hx:  Social History     Socioeconomic History     Marital status:      Spouse name: Not on file     Number of children: Not on file     Years of education: Not on file     Highest education level: Not on file   Occupational History     Not on file   Tobacco Use     Smoking status: Former     Smokeless tobacco: Never   Vaping Use     Vaping status: Not on file   Substance and Sexual Activity     Alcohol use: No     Drug use: No     Sexual activity: Never     Partners: Male   Other Topics Concern     Not on file   Social History Narrative     Not on file     Social Determinants of Health     Financial Resource Strain: Not on file   Food Insecurity: Not on file   Transportation Needs: Not on file   Physical Activity: Not on file   Stress: Not on file   Social Connections: Not on file   Intimate Partner Violence: Not on file   Housing Stability: Not on file          MEDICATIONS:  has a current medication list which includes the following prescription(s): albuterol, alprazolam, aspirin, atorvastatin, glimepiride, ibuprofen, novolin n relion, lisinopril, metformin, omeprazole, semaglutide, sertraline, accu-chek guide, budesonide-formoterol, vitamin d3, insulin syringe-needle u-100, multivitamin, and vitamin b-12.     ROS: 10 point ROS neg other than the symptoms noted above in the HPI.     GENERAL: mild fatigue, wt stable; fevers, chills, malaise, night sweats.   HEENT: no dysphagia, odonophagia, diplopia, neck pain or tenderness  THYROID:  no apparent hyper or hypothyroid  symptoms  CV:  Denies chest pain, pressure or palpitations  LUNGS:  Denies wheezing, cough, SOB, PEREZ  ABDOMEN: no diarrhea, constipation, abdominal pain  EXTREMITIES: ankle swelling; no rashes, ulcers  NEUROLOGY: no changes in vision, tingling or numbness in hands or feet.   MSK: pains at knees, low back, and right hip pains denies muscle weakness  SKIN: no rashes or lesions  : nocturia 1-2x, no menses; no hot flashes or night sweats  ENDOCRINE: no heat or cold intolerance      Physical Exam   VS: /84   Pulse 90   Wt 108.1 kg (238 lb 4.8 oz)   BMI 43.59 kg/m    GENERAL: AXOX3, NAD, well dressed, answering questions appropriately, appears stated age.  ENT: no nose swelling or nasal discharge, mouth redness or gum changes.  EYES: eyes grossly normal to inspection, conjunctivae and sclerae normal, no exophthalmos or proptosis  THYROID:  no apparent nodules or goiter  LUNGS: no audible wheeze, cough or visible cyanosis, or increased work of breathing  ABDOMEN: abdomen obese size  EXTREMITIES: mild ankle edema noted  NEUROLOGY: CN grossly intact, no tremors  MSK: grossly intact  SKIN:  Superficial v.veins at feet and ankles; no apparent skin lesions, rash, or edema with visualized skin appearance  PSYCH: mentation appears normal, affect normal/bright, judgement and insight intact,   normal speech and appearance well groomed       LABS:     All pertinent notes, labs, and images personally reviewed by me.        A/P:  Encounter Diagnosis   Name Primary?     Type 2 diabetes mellitus with microalbuminuria, with long-term current use of insulin (H) Yes       Comments:  Reviewed health history and overall diabetes issues.  Reviewed and interpreted tests that I previously ordered.   Ordered appropriate tests for the endocrinology disease management.    Management options discussed and implemented after shared medical decision making with the patient.  T2DM problem is chronic-stable    Plan:  Discussed general issues  with the diabetes mellitus diagnosis and management  We discussed the [id number] test which reflects previous overall glucose levels or control  Reviewed the importance of blood glucose (BG) testing to assess glucose trends  Provided general overview of diabetes (oral and injectable) medication use     Recommended:  Continue current Metformin, glimeparide, and NPH insulin medications:  Metformin 1000 mg     1-tab in morning  glimeparide 2 mg        1-tab in morning  Relion NPH                 Subcutaneous 40U in evening    Discussed idea of adding another medication such as a GLP1-RA medication   Reviewed the Victoza, Ozempic, Trulicity, Mounjaro med options, pen dosing technique   Discussed the expected medication effects and possible SE's   Will send pen Rx to pharmacy and see if covered... start with Ozempic 0.25 mg weekly   Contact me if questions about this new med Rx and treatment plan  Check BGM levels fasting, presupper and bedtime for several days  Goal target  mg/dl  Consider wearing CGM sensor to assess glucose trends  Check preappt labs in 5/2023    Discussed option of nearby [facility] or [facility]   Lab orders placed  Keep focus on diet, exercise, and weight management  Continue atorvastatin 40 mg po QD dose plan  Advise having fasting lipid panel testing and dilated eye examination, at least annually     See PCP about the ankle swelling  Addressed patient questions today    There are no Patient Instructions on file for this visit.    Future labs ordered today: No orders of the defined types were placed in this encounter.    Radiology/Consults ordered today: None    Total time spent on day of encounter:  25 min    Follow-up:  9/13/23 Return    DAMION Goldstein MD, MS  Endocrinology  [facility]                      Again, thank you for allowing me to participate in the care of your patient.        Sincerely,        Marco Goldstein MD

## 2023-05-17 ENCOUNTER — TELEPHONE (OUTPATIENT)
Dept: ENDOCRINOLOGY | Facility: CLINIC | Age: 78
End: 2023-05-17
Payer: MEDICARE

## 2023-05-17 DIAGNOSIS — Z79.4 TYPE 2 DIABETES MELLITUS WITH MICROALBUMINURIA, WITH LONG-TERM CURRENT USE OF INSULIN (H): ICD-10-CM

## 2023-05-17 DIAGNOSIS — R80.9 TYPE 2 DIABETES MELLITUS WITH MICROALBUMINURIA, WITH LONG-TERM CURRENT USE OF INSULIN (H): ICD-10-CM

## 2023-05-17 DIAGNOSIS — E11.29 TYPE 2 DIABETES MELLITUS WITH MICROALBUMINURIA, WITH LONG-TERM CURRENT USE OF INSULIN (H): ICD-10-CM

## 2023-05-17 NOTE — TELEPHONE ENCOUNTER
Prior Authorization Retail Medication Request    Medication/Dose: semaglutide (OZEMPIC) 2 MG/3ML pen  ICD code (if different than what is on RX):  E11.29, R80.9, Z79.4    Insurance Name:  [facility]   Insurance ID:  [id number]       Pharmacy Information (if different than what is on RX)  Name:  [facility]  Phone:  [phone]

## 2023-05-18 ENCOUNTER — TELEPHONE (OUTPATIENT)
Dept: ENDOCRINOLOGY | Facility: CLINIC | Age: 78
End: 2023-05-18
Payer: MEDICARE

## 2023-05-18 NOTE — TELEPHONE ENCOUNTER
[facility] Call Center    Phone Message    May a detailed message be left on voicemail: yes     Reason for Call: Medication Question or concern regarding medication   Prescription Clarification  Name of Medication: semaglutide (OZEMPIC) 2 MG/3ML pen [[id number]]  insulin NPH (NOVOLIN N RELION) 100 UNIT/ML vial [[id number]]    Prescribing Provider: Dr Goldstein      What on the order needs clarification? requires clinic notes including alternative medications.  Will be faxing request to the [place] [facility]           Action Taken: Other: endo    Travel Screening: Not Applicable

## 2023-05-25 NOTE — TELEPHONE ENCOUNTER
Pharmacy called and requested a resubmission of this RX asap . The PA was approved but the rx was accidentally deleted. THANK YOU

## 2023-06-22 DIAGNOSIS — Z79.4 TYPE 2 DIABETES MELLITUS WITHOUT COMPLICATION, WITH LONG-TERM CURRENT USE OF INSULIN (H): ICD-10-CM

## 2023-06-22 DIAGNOSIS — E11.9 TYPE 2 DIABETES MELLITUS WITHOUT COMPLICATION, WITH LONG-TERM CURRENT USE OF INSULIN (H): ICD-10-CM

## 2023-06-22 RX ORDER — HUMAN INSULIN 100 [IU]/ML
INJECTION, SUSPENSION SUBCUTANEOUS
Qty: 40 ML | Refills: 0 | Status: SHIPPED | OUTPATIENT
Start: 2023-06-22 | End: 2023-09-19

## 2023-06-22 NOTE — TELEPHONE ENCOUNTER
"Last Written Prescription Date:  3/23/23  Last Fill Quantity: 40,  # refills: 0   Last office visit: 5/16/2023 with Dr. Goldstein  Future Office Visit:  9/13/23        Requested Prescriptions   Pending Prescriptions Disp Refills     insulin NPH (NOVOLIN N RELION) 100 UNIT/ML vial [Pharmacy Med Name: NovoLIN N ReliOn 100 UNIT/ML Subcutaneous Suspension] 40 mL 0     Sig: INJECT 50-54 UNITS SUBCUTANEOUSLY TWICE DAILY AS DIRECTED. TDD APPROX 104 UNITS.       Intermediate Acting Insulin Protocol Passed - 6/22/2023 12:54 PM        Passed - Serum creatinine on file in past 12 months     Recent Labs   Lab Test 05/04/23  1100   CR 0.93       Ok to refill medication if creatinine is low          Passed - HgbA1C in past 3 or 6 months     If HgbA1C is 8 or greater, it needs to be on file within the past 3 months.  If less than 8, must be on file within the past 6 months.     Recent Labs   Lab Test 05/04/23  1100   A1C 9.2*             Passed - Medication is active on med list        Passed - Patient is age 18 or older        Passed - Recent (6 mo) or future (30 days) visit within the authorizing provider's specialty     Patient had office visit in the last 6 months or has a visit in the next 30 days with authorizing provider or within the authorizing provider's specialty.  See \"Patient Info\" tab in inbasket, or \"Choose Columns\" in Meds & Orders section of the refill encounter.               Refills sent  Mita Humphreys RN    "

## 2023-09-13 ENCOUNTER — OFFICE VISIT (OUTPATIENT)
Dept: ENDOCRINOLOGY | Facility: CLINIC | Age: 78
End: 2023-09-13
Payer: MEDICARE

## 2023-09-13 VITALS
BODY MASS INDEX: 42.98 KG/M2 | DIASTOLIC BLOOD PRESSURE: 83 MMHG | SYSTOLIC BLOOD PRESSURE: 126 MMHG | WEIGHT: 235 LBS | HEART RATE: 96 BPM

## 2023-09-13 DIAGNOSIS — R80.9 TYPE 2 DIABETES MELLITUS WITH MICROALBUMINURIA, WITH LONG-TERM CURRENT USE OF INSULIN (H): Primary | ICD-10-CM

## 2023-09-13 DIAGNOSIS — E78.5 HYPERLIPIDEMIA LDL GOAL <100: ICD-10-CM

## 2023-09-13 DIAGNOSIS — Z79.4 TYPE 2 DIABETES MELLITUS WITH MICROALBUMINURIA, WITH LONG-TERM CURRENT USE OF INSULIN (H): Primary | ICD-10-CM

## 2023-09-13 DIAGNOSIS — E11.29 TYPE 2 DIABETES MELLITUS WITH MICROALBUMINURIA, WITH LONG-TERM CURRENT USE OF INSULIN (H): Primary | ICD-10-CM

## 2023-09-13 PROCEDURE — 99214 OFFICE O/P EST MOD 30 MIN: CPT | Performed by: INTERNAL MEDICINE

## 2023-09-13 NOTE — Clinical Note
9/13/2023         RE: Anahi Gramajo  [address]  [address]  [facility] [address]        Dear Colleague,    Thank you for referring your patient, Anahi Gramajo, to the [facility]. Please see a copy of my visit note below.      Recent issues:  Diabetes follow-up evaluation  Started Ozempic medication ~7/2023 but forgetful   Subsequently stopped metformin and glimeparide  Reports good FBG levels overall but significant rise in hgbA1c levels          Previous history of T2DM, diagnosed ~age 62  Recalls taking oral DM meds, including metformin  Had seen Dr. AMITA Flood/Herb, also Dr. RAMSES Daniel  ~2012 Began use of daily insulin  5/2019. Changed insulin from Levemir to Relion NPH              Initial dosing NPH 50U BID  Fall 2019. Patient concerned with low FBG, decided to stop the morning NPH insulin use    Current DM meds:   Relion NPH                 Subcutaneous 40U in evening  Ozempic 0.25 mg Subcutaneous weekly     Uses Accucheck Guide meter   FBG  mg/dl per patient       Recent HP labs include:      Previous FV labs include:       Recent FV labs include:          Lab Results   Component Value Date    A1C 9.2 (H) 05/04/2023     05/04/2023    POTASSIUM 4.9 05/04/2023    CHLORIDE 102 05/04/2023    CO2 26 05/04/2023    ANIONGAP 12 05/04/2023     (H) 05/04/2023    BUN 19.0 05/04/2023    CR 0.93 05/04/2023    GFRESTIMATED 63 05/04/2023    GFRESTBLACK 70 01/29/2021    LESTER 10.1 05/04/2023    CHOL 210 (H) 08/09/2021    TRIG 204 (H) 08/09/2021    HDL 44 (L) 08/09/2021     (H) 08/09/2021    NHDL 166 (H) 08/09/2021    UCRR 192 08/09/2021    MICROL 28 08/09/2021    UMALCR 14.58 08/09/2021     Lab Results   Component Value Date    TSH 2.14 08/09/2021     [facility] no DR noted  DM complications:                Nephropathy:                          Microalbuminuria        Lives in [facility]  She sees Dr. Keila Stevens/[facility]  [place] for gen med evaluations     PMH/PSH:  Past Medical History:   Diagnosis Date    Asthma     Depression     Hyperlipidemia     Labile blood pressure     Obesity     Type 2 diabetes mellitus (H)      No past surgical history on file.    Family Hx:  No family history on file.      Social Hx:  Social History     Socioeconomic History    Marital status:      Spouse name: Not on file    Number of children: Not on file    Years of education: Not on file    Highest education level: Not on file   Occupational History    Not on file   Tobacco Use    Smoking status: Former    Smokeless tobacco: Never   Substance and Sexual Activity    Alcohol use: No    Drug use: No    Sexual activity: Never     Partners: Male   Other Topics Concern    Not on file   Social History Narrative    Not on file     Social Determinants of Health     Financial Resource Strain: Not on file   Food Insecurity: Not on file   Transportation Needs: Not on file   Physical Activity: Not on file   Stress: Not on file   Social Connections: Not on file   Intimate Partner Violence: Not on file   Housing Stability: Not on file          MEDICATIONS:  has a current medication list which includes the following prescription(s): albuterol, alprazolam, aspirin, atorvastatin, budesonide-formoterol, ibuprofen, novolin n relion, lisinopril, omeprazole, semaglutide, sertraline, accu-chek guide, vitamin d3, glimepiride, insulin syringe-needle u-100, metformin, multivitamin, and vitamin b-12.     ROS: 10 point ROS neg other than the symptoms noted above in the HPI.     GENERAL: mild fatigue, wt stable; fevers, chills, malaise, night sweats.   HEENT: no dysphagia, odonophagia, diplopia, neck pain or tenderness  THYROID:  no apparent hyper or hypothyroid symptoms  CV:  Denies chest pain, pressure or palpitations  LUNGS:  Denies wheezing, cough, SOB, PEREZ  ABDOMEN: no diarrhea, constipation, abdominal pain  EXTREMITIES: ankle swelling; no rashes, ulcers  NEUROLOGY:  no changes in vision, tingling or numbness in hands or feet.   MSK: pains at knees, low back, and right hip pains denies muscle weakness  SKIN: no rashes or lesions  : nocturia 1-2x, no menses; no hot flashes or night sweats  ENDOCRINE: no heat or cold intolerance      Physical Exam   VS: /83   Pulse 96   Wt 106.6 kg (235 lb)   BMI 42.98 kg/m    GENERAL: AXOX3, NAD, well dressed, answering questions appropriately, appears stated age.  ENT: no nose swelling or nasal discharge, mouth redness or gum changes.  EYES: eyes grossly normal to inspection, conjunctivae and sclerae normal, no exophthalmos or proptosis  THYROID:  no apparent nodules or goiter  LUNGS: no audible wheeze, cough or visible cyanosis, or increased work of breathing  ABDOMEN: abdomen obese size  EXTREMITIES: mild ankle edema noted  NEUROLOGY: CN grossly intact, no tremors  MSK: grossly intact  SKIN:  Superficial v.veins at feet and ankles; no apparent skin lesions, rash, or edema with visualized skin appearance  PSYCH: mentation appears normal, affect normal/bright, judgement and insight intact,   normal speech and appearance well groomed       LABS:     All pertinent notes, labs, and images personally reviewed by me.        A/P:  No diagnosis found.      Comments:  Reviewed health history and overall diabetes issues.  Reviewed and interpreted tests that I previously ordered.   Ordered appropriate tests for the endocrinology disease management.    Management options discussed and implemented after shared medical decision making with the patient.  T2DM problem is chronic-stable    Plan:  Discussed general issues with the diabetes mellitus diagnosis and management  We discussed the [id number] test which reflects previous overall glucose levels or control  Reviewed the importance of blood glucose (BG) testing to assess glucose trends  Provided general overview of diabetes (oral and injectable) medication use     Recommended:  Continue  current Metformin, glimeparide, and NPH insulin medications:  Metformin 1000 mg     1-tab in morning  glimeparide 2 mg        1-tab in morning  Relion NPH                 Subcutaneous 40U in evening    Discussed idea of adding another medication such as a GLP1-RA medication   Reviewed the Victoza, Ozempic, Trulicity, Mounjaro med options, pen dosing technique   Discussed the expected medication effects and possible SE's   Will send pen Rx to pharmacy and see if covered... start with Ozempic 0.25 mg weekly  Patient to contact me with update on the Ozempic med Rx,    since we would lower the glimeparide and NPH insulin doses when Ozempic started  Check BGM levels fasting, presupper and bedtime for several days  Goal target  mg/dl  Consider wearing CGM sensor to assess glucose trends      Libre2, Ozempic , NPH 20 BID  Consider adding glimeparide again  See PCP about RSV vacc  No edema?  Mild wt loss  Needs repeat fasting labs...   Discussed option of nearby [facility] or her [facility]   Lab orders placed  Keep focus on diet, exercise, and weight management  Continue atorvastatin 40 mg po QD dose plan  Advise having fasting lipid panel testing and dilated eye examination, at least annually     See PCP about the ankle swelling  Addressed patient questions today    There are no Patient Instructions on file for this visit.    Future labs ordered today: No orders of the defined types were placed in this encounter.    Radiology/Consults ordered today: None    Total time spent on day of encounter:  ***    Follow-up:  9/27/23 at 9:45 am, Return    DAMION Goldstein MD, MS  Endocrinology  [facility]                      Recent issues:  Diabetes follow-up evaluation  Started Ozempic medication ~7/2023 but forgetful   Subsequently stopped metformin and glimeparide  Reports good FBG levels overall but significant rise in hgbA1c levels          Previous history of T2DM, diagnosed ~age 62  Recalls taking  oral DM meds, including metformin  Had seen Dr. AMITA Flood/Herb, also Dr. RAMSES Daniel  ~2012 Began use of daily insulin  5/2019. Changed insulin from Levemir to Relion NPH              Initial dosing NPH 50U BID  Fall 2019. Patient concerned with low FBG, decided to stop the morning NPH insulin use    Current DM meds:   Relion NPH                 Subcutaneous 40U in evening  Ozempic 0.25 mg Subcutaneous weekly     Uses Accucheck Guide meter   FBG  mg/dl per patient    Recent HP labs include:      Previous FV labs include:       Recent FV labs include:          Lab Results   Component Value Date    A1C 9.2 (H) 05/04/2023     05/04/2023    POTASSIUM 4.9 05/04/2023    CHLORIDE 102 05/04/2023    CO2 26 05/04/2023    ANIONGAP 12 05/04/2023     (H) 05/04/2023    BUN 19.0 05/04/2023    CR 0.93 05/04/2023    GFRESTIMATED 63 05/04/2023    GFRESTBLACK 70 01/29/2021    LESTER 10.1 05/04/2023    CHOL 210 (H) 08/09/2021    TRIG 204 (H) 08/09/2021    HDL 44 (L) 08/09/2021     (H) 08/09/2021    NHDL 166 (H) 08/09/2021    UCRR 192 08/09/2021    MICROL 28 08/09/2021    UMALCR 14.58 08/09/2021     Lab Results   Component Value Date    TSH 2.14 08/09/2021     [facility], no DR noted  DM complications:                Nephropathy:                          Microalbuminuria        Lives in [facility]  She sees Dr. Keila Stevens/[facility] for gen med evaluations     PMH/PSH:  Past Medical History:   Diagnosis Date     Asthma      Depression      Hyperlipidemia      Labile blood pressure      Obesity      Type 2 diabetes mellitus (H)      No past surgical history on file.    Family Hx:  No family history on file.      Social Hx:  Social History     Socioeconomic History     Marital status:      Spouse name: Not on file     Number of children: Not on file     Years of education: Not on file     Highest education level: Not on file   Occupational History     Not on file    Tobacco Use     Smoking status: Former     Smokeless tobacco: Never   Substance and Sexual Activity     Alcohol use: No     Drug use: No     Sexual activity: Never     Partners: Male   Other Topics Concern     Not on file   Social History Narrative     Not on file     Social Determinants of Health     Financial Resource Strain: Not on file   Food Insecurity: Not on file   Transportation Needs: Not on file   Physical Activity: Not on file   Stress: Not on file   Social Connections: Not on file   Intimate Partner Violence: Not on file   Housing Stability: Not on file          MEDICATIONS:  has a current medication list which includes the following prescription(s): albuterol, alprazolam, aspirin, atorvastatin, budesonide-formoterol, freestyle selma 2 reader, freestyle selma 2 sensor, ibuprofen, novolin n relion, lisinopril, omeprazole, semaglutide, sertraline, accu-chek guide, vitamin d3, glimepiride, insulin syringe-needle u-100, metformin, multivitamin, and vitamin b-12.     ROS: 10 point ROS neg other than the symptoms noted above in the HPI.     GENERAL: mild fatigue, wt stable; fevers, chills, malaise, night sweats.   HEENT: no dysphagia, odonophagia, diplopia, neck pain or tenderness  THYROID:  no apparent hyper or hypothyroid symptoms  CV:  Denies chest pain, pressure or palpitations  LUNGS:  Denies wheezing, cough, SOB, PEREZ  ABDOMEN: no diarrhea, constipation, abdominal pain  EXTREMITIES: less ankle swelling; no rashes, ulcers  NEUROLOGY: no changes in vision, tingling or numbness in hands or feet.   MSK: pains at knees, low back, and right hip pains denies muscle weakness  SKIN: no rashes or lesions  : nocturia 1-2x, no menses; no hot flashes or night sweats  ENDOCRINE: no heat or cold intolerance      Physical Exam   VS: /83   Pulse 96   Wt 106.6 kg (235 lb)   BMI 42.98 kg/m    GENERAL: AXOX3, NAD, well dressed, answering questions appropriately, appears stated age.  ENT: no nose swelling or  nasal discharge, mouth redness or gum changes.  EYES: eyes grossly normal to inspection, conjunctivae and sclerae normal, no exophthalmos or proptosis  THYROID:  no apparent nodules or goiter  LUNGS: no audible wheeze, cough or visible cyanosis, or increased work of breathing  ABDOMEN: abdomen obese size  EXTREMITIES: mild ankle edema noted  NEUROLOGY: CN grossly intact, no tremors  MSK: grossly intact  SKIN:  Superficial v.veins at feet and ankles; no apparent skin lesions, rash, or edema with visualized skin appearance  PSYCH: mentation appears normal, affect normal/bright, judgement and insight intact,   normal speech and appearance well groomed       LABS:     All pertinent notes, labs, and images personally reviewed by me.        A/P:  Encounter Diagnosis   Name Primary?     Type 2 diabetes mellitus with microalbuminuria, with long-term current use of insulin (H) Yes     Comments:  Reviewed health history and overall diabetes issues.  I am surprised she discontinued diabetes medications without contact me  Reviewed and interpreted tests that I previously ordered.   Ordered appropriate tests for the endocrinology disease management.    Management options discussed and implemented after shared medical decision making with the patient.  T2DM problem is chronic-stable    Plan:  Discussed general issues with the diabetes mellitus diagnosis and management  We discussed the yefzknccznY1m test which reflects previous overall glucose levels or control  Reviewed the importance of blood glucose (BG) testing to assess glucose trends  Provided general overview of diabetes (oral and injectable) medication use     Recommended:  Continue the Ozempic and NPH insulin medications:  Ozempic 0.5 mg  Subcutaneous weekly  NPH 20U  Subcutaneous BID    Reviewed future option changing NPH BID to basal insulin every day  Consider restarting glimeparide med use  Check BGM levels fasting, presupper and bedtime for several days  Goal target   mg/dl  Consider wearing CGM sensor to assess glucose trends   Discussed Libre2 CGM option, use of [place]   New Libre2 Rx's sent to pharmacy   Plan follow-up endocrinology evaluation and Libre2 start soon  Needs repeat fasting labs soon   Discussed option of nearby [facility] or her [facility]   Lab orders placed  Keep focus on diet, exercise, and weight management  Continue atorvastatin 40 mg po QD dose plan  Advise having fasting lipid panel testing and dilated eye examination, at least annually     See PCP about the ankle swelling, RSV vaccination question  Addressed patient questions today    There are no Patient Instructions on file for this visit.    Future labs ordered today: No orders of the defined types were placed in this encounter.    Radiology/Consults ordered today: None    Total time spent on day of encounter:  35 min    Follow-up:  9/27/23 at 9:45 am, Return    DAMION Goldstein MD, MS  Endocrinology  [facility]                      Again, thank you for allowing me to participate in the care of your patient.        Sincerely,        Marco Goldstein MD

## 2023-09-13 NOTE — PROGRESS NOTES
Recent issues:  Diabetes follow-up evaluation  Started Ozempic medication ~7/2023 but forgetful   Subsequently stopped metformin and glimeparide  Reports good FBG levels overall but significant rise in hgbA1c levels          Previous history of T2DM, diagnosed ~age 62  Recalls taking oral DM meds, including metformin  Had seen Dr. AMITA Flood/Herb, also Dr. RAMSES Daniel  ~2012 Began use of daily insulin  5/2019. Changed insulin from Levemir to Relion NPH              Initial dosing NPH 50U BID  Fall 2019. Patient concerned with low FBG, decided to stop the morning NPH insulin use    Current DM meds:   Relion NPH                 Subcutaneous 40U in evening  Ozempic 0.25 mg Subcutaneous weekly     Uses Accucheck Guide meter   FBG  mg/dl per patient    Recent HP labs include:      Previous FV labs include:       Recent FV labs include:          Lab Results   Component Value Date    A1C 9.2 (H) 05/04/2023     05/04/2023    POTASSIUM 4.9 05/04/2023    CHLORIDE 102 05/04/2023    CO2 26 05/04/2023    ANIONGAP 12 05/04/2023     (H) 05/04/2023    BUN 19.0 05/04/2023    CR 0.93 05/04/2023    GFRESTIMATED 63 05/04/2023    GFRESTBLACK 70 01/29/2021    LESTER 10.1 05/04/2023    CHOL 210 (H) 08/09/2021    TRIG 204 (H) 08/09/2021    HDL 44 (L) 08/09/2021     (H) 08/09/2021    NHDL 166 (H) 08/09/2021    UCRR 192 08/09/2021    MICROL 28 08/09/2021    UMALCR 14.58 08/09/2021     Lab Results   Component Value Date    TSH 2.14 08/09/2021     [facility], no DR noted  DM complications:                Nephropathy:                          Microalbuminuria        L[facility] in [facility]  [facility]     PMH/PSH:  Past Medical History:   Diagnosis Date    Asthma     Depression     Hyperlipidemia     Labile blood pressure     Obesity     Type 2 diabetes mellitus (H)      No past surgical history on file.    Family Hx:  No family history on  file.      Social Hx:  Social History     Socioeconomic History    Marital status:      Spouse name: Not on file    Number of children: Not on file    Years of education: Not on file    Highest education level: Not on file   Occupational History    Not on file   Tobacco Use    Smoking status: Former    Smokeless tobacco: Never   Substance and Sexual Activity    Alcohol use: No    Drug use: No    Sexual activity: Never     Partners: Male   Other Topics Concern    Not on file   Social History Narrative    Not on file     Social Determinants of Health     Financial Resource Strain: Not on file   Food Insecurity: Not on file   Transportation Needs: Not on file   Physical Activity: Not on file   Stress: Not on file   Social Connections: Not on file   Intimate Partner Violence: Not on file   Housing Stability: Not on file          MEDICATIONS:  has a current medication list which includes the following prescription(s): albuterol, alprazolam, aspirin, atorvastatin, budesonide-formoterol, freestyle selma 2 reader, freestyle selma 2 sensor, ibuprofen, novolin n relion, lisinopril, omeprazole, semaglutide, sertraline, accu-chek guide, vitamin d3, glimepiride, insulin syringe-needle u-100, metformin, multivitamin, and vitamin b-12.     ROS: 10 point ROS neg other than the symptoms noted above in the HPI.     GENERAL: mild fatigue, wt stable; fevers, chills, malaise, night sweats.   HEENT: no dysphagia, odonophagia, diplopia, neck pain or tenderness  THYROID:  no apparent hyper or hypothyroid symptoms  CV:  Denies chest pain, pressure or palpitations  LUNGS:  Denies wheezing, cough, SOB, PEREZ  ABDOMEN: no diarrhea, constipation, abdominal pain  EXTREMITIES: less ankle swelling; no rashes, ulcers  NEUROLOGY: no changes in vision, tingling or numbness in hands or feet.   MSK: pains at knees, low back, and right hip pains denies muscle weakness  SKIN: no rashes or lesions  : nocturia 1-2x, no menses; no hot flashes or  night sweats  ENDOCRINE: no heat or cold intolerance      Physical Exam   VS: /83   Pulse 96   Wt 106.6 kg (235 lb)   BMI 42.98 kg/m    GENERAL: AXOX3, NAD, well dressed, answering questions appropriately, appears stated age.  ENT: no nose swelling or nasal discharge, mouth redness or gum changes.  EYES: eyes grossly normal to inspection, conjunctivae and sclerae normal, no exophthalmos or proptosis  THYROID:  no apparent nodules or goiter  LUNGS: no audible wheeze, cough or visible cyanosis, or increased work of breathing  ABDOMEN: abdomen obese size  EXTREMITIES: mild ankle edema noted  NEUROLOGY: CN grossly intact, no tremors  MSK: grossly intact  SKIN:  Superficial v.veins at feet and ankles; no apparent skin lesions, rash, or edema with visualized skin appearance  PSYCH: mentation appears normal, affect normal/bright, judgement and insight intact,   normal speech and appearance well groomed       LABS:     All pertinent notes, labs, and images personally reviewed by me.        A/P:  Encounter Diagnosis   Name Primary?    Type 2 diabetes mellitus with microalbuminuria, with long-term current use of insulin (H) Yes     Comments:  Reviewed health history and overall diabetes issues.  I am surprised she discontinued diabetes medications without contact me  Reviewed and interpreted tests that I previously ordered.   Ordered appropriate tests for the endocrinology disease management.    Management options discussed and implemented after shared medical decision making with the patient.  T2DM problem is chronic-stable    Plan:  Discussed general issues with the diabetes mellitus diagnosis and management  We discussed the [id number] test which reflects previous overall glucose levels or control  Reviewed the importance of blood glucose (BG) testing to assess glucose trends  Provided general overview of diabetes (oral and injectable) medication use     Recommended:  Continue the Ozempic and NPH insulin  medications:  Ozempic 0.5 mg  Subcutaneous weekly  NPH 20U  Subcutaneous BID    Reviewed future option changing NPH BID to basal insulin every day  Consider restarting glimeparide med use  Doubt she could manage MDI medication plan  Check BGM levels fasting, presupper and bedtime for several days  Goal target  mg/dl  Consider wearing CGM sensor to assess glucose trends   Discussed Libre2 CGM option, use of [place]   New Libre2 Rx's sent to pharmacy   Plan follow-up endocrinology evaluation and Libre2 start soon  Needs repeat fasting labs soon   Discussed option of nearby [facility] or her [facility]   Lab orders placed  Keep focus on diet, exercise, and weight management  Continue atorvastatin 40 mg po QD dose plan  Advise having fasting lipid panel testing and dilated eye examination, at least annually     See PCP about the ankle swelling, RSV vaccination question  Addressed patient questions today    There are no Patient Instructions on file for this visit.    Future labs ordered today: No orders of the defined types were placed in this encounter.    Radiology/Consults ordered today: None    Total time spent on day of encounter:  35 min    Follow-up:  9/27/23 at 9:45 am, Return    DAMION Goldstein MD, MS  Endocrinology  [facility]    CC:  Keila Chatterjee

## 2023-09-14 ENCOUNTER — TRANSFERRED RECORDS (OUTPATIENT)
Dept: HEALTH INFORMATION MANAGEMENT | Facility: CLINIC | Age: 78
End: 2023-09-14
Payer: MEDICARE

## 2023-09-14 LAB — RETINOPATHY: NEGATIVE

## 2023-09-18 ENCOUNTER — TELEPHONE (OUTPATIENT)
Dept: ENDOCRINOLOGY | Facility: CLINIC | Age: 78
End: 2023-09-18
Payer: MEDICARE

## 2023-09-18 DIAGNOSIS — R80.9 TYPE 2 DIABETES MELLITUS WITH MICROALBUMINURIA, WITH LONG-TERM CURRENT USE OF INSULIN (H): ICD-10-CM

## 2023-09-18 DIAGNOSIS — Z79.4 TYPE 2 DIABETES MELLITUS WITH MICROALBUMINURIA, WITH LONG-TERM CURRENT USE OF INSULIN (H): ICD-10-CM

## 2023-09-18 DIAGNOSIS — E11.29 TYPE 2 DIABETES MELLITUS WITH MICROALBUMINURIA, WITH LONG-TERM CURRENT USE OF INSULIN (H): ICD-10-CM

## 2023-09-18 NOTE — TELEPHONE ENCOUNTER
Called pharmacy to see if there is any problem with the libre2 rx, and it is not covered.  Will route to provider to advise.  iMta Humphreys RN

## 2023-09-18 NOTE — TELEPHONE ENCOUNTER
[facility] Call Center    Phone Message    May a detailed message be left on voicemail: yes     Reason for Call: Medication Question or concern regarding medication   Prescription Clarification  Name of Medication: Continuous Blood Gluc  (FREESTYLE TAMARA 2 READER) JOSSUE [[id number]]  Continuous Blood Gluc Sensor (FREESTYLE TAMARA 2 SENSOR) [facility] [[id number]]     Prescribing Provider: Marco Goldstein MD   Pharmacy: [facility] #[id number] [place] [address] [id number] AT [facility]     What on the order needs clarification? Patient is stating that her pharmacy does not have the required.  She would like a call back from the care team to discuss and to determine where she will be able to get what is required.        Action Taken: Other: endo    Travel Screening: Not Applicable

## 2023-09-19 DIAGNOSIS — E11.9 TYPE 2 DIABETES MELLITUS WITHOUT COMPLICATION, WITH LONG-TERM CURRENT USE OF INSULIN (H): ICD-10-CM

## 2023-09-19 DIAGNOSIS — Z79.4 TYPE 2 DIABETES MELLITUS WITHOUT COMPLICATION, WITH LONG-TERM CURRENT USE OF INSULIN (H): ICD-10-CM

## 2023-09-19 RX ORDER — HUMAN INSULIN 100 [IU]/ML
INJECTION, SUSPENSION SUBCUTANEOUS
Qty: 40 ML | Refills: 0 | Status: SHIPPED | OUTPATIENT
Start: 2023-09-19 | End: 2023-12-15

## 2023-09-19 NOTE — TELEPHONE ENCOUNTER
Called pt to notify that Otilio rx's were sent to [facility] in [place] since [facility] doesn't contract with Medicare Part B.  Pt verbalized understanding. Mita Humphreys RN

## 2023-09-19 NOTE — TELEPHONE ENCOUNTER
Message reviewed. I called her [facility] pharmacy today and was told that [facility] does not contract with Medicare Part B.  I then decided to re-send the Freestyle Libre2 sensor and [place] Rx's to her [facility] pharmacy... hope they will cover these items. If they don't, maybe [facility] a future option.    Please relay message to patient.    DAMION Goldstein MD, MS  Endocrinology  [facility]

## 2023-09-19 NOTE — TELEPHONE ENCOUNTER
"Last Written Prescription Date:  6/22/23  Last Fill Quantity: 40,  # refills: 0   Last office visit: 9/13/2023 with Dr. Goldstein  Future Office Visit:  9/27/23        Requested Prescriptions   Pending Prescriptions Disp Refills    insulin NPH (NOVOLIN N RELION) 100 UNIT/ML vial [Pharmacy Med Name: NovoLIN N ReliOn 100 UNIT/ML Subcutaneous Suspension] 40 mL 0     Sig: INJECT 50-54 UNITS SUBCUTANEOUSLY TWICE DAILY AS DIRECTED. **TOTAL DAILY DOSE APPROXIMATELY 104 UNITS**       Intermediate Acting Insulin Protocol Failed - 9/19/2023 10:27 AM        Failed - HgbA1C in past 3 or 6 months     If HgbA1C is 8 or greater, it needs to be on file within the past 3 months.  If less than 8, must be on file within the past 6 months.     Recent Labs   Lab Test 05/04/23  1100   A1C 9.2*             Passed - Serum creatinine on file in past 12 months     Recent Labs   Lab Test 05/04/23  1100   CR 0.93       Ok to refill medication if creatinine is low          Passed - Medication is active on med list        Passed - Patient is age 18 or older        Passed - Recent (6 mo) or future (30 days) visit within the authorizing provider's specialty     Patient had office visit in the last 6 months or has a visit in the next 30 days with authorizing provider or within the authorizing provider's specialty.  See \"Patient Info\" tab in inbasket, or \"Choose Columns\" in Meds & Orders section of the refill encounter.               Refill sent.  Mita Humphreys RN    "

## 2023-09-27 ENCOUNTER — OFFICE VISIT (OUTPATIENT)
Dept: ENDOCRINOLOGY | Facility: CLINIC | Age: 78
End: 2023-09-27
Payer: MEDICARE

## 2023-09-27 VITALS
OXYGEN SATURATION: 91 % | BODY MASS INDEX: 42.84 KG/M2 | WEIGHT: 234.2 LBS | SYSTOLIC BLOOD PRESSURE: 120 MMHG | DIASTOLIC BLOOD PRESSURE: 72 MMHG

## 2023-09-27 DIAGNOSIS — Z79.4 TYPE 2 DIABETES MELLITUS WITH MICROALBUMINURIA, WITH LONG-TERM CURRENT USE OF INSULIN (H): Primary | ICD-10-CM

## 2023-09-27 DIAGNOSIS — E11.29 TYPE 2 DIABETES MELLITUS WITH MICROALBUMINURIA, WITH LONG-TERM CURRENT USE OF INSULIN (H): Primary | ICD-10-CM

## 2023-09-27 DIAGNOSIS — R80.9 TYPE 2 DIABETES MELLITUS WITH MICROALBUMINURIA, WITH LONG-TERM CURRENT USE OF INSULIN (H): Primary | ICD-10-CM

## 2023-09-27 PROCEDURE — 99212 OFFICE O/P EST SF 10 MIN: CPT | Performed by: INTERNAL MEDICINE

## 2023-09-27 PROCEDURE — 95250 CONT GLUC MNTR PHYS/QHP EQP: CPT | Performed by: INTERNAL MEDICINE

## 2023-09-27 NOTE — PROGRESS NOTES
Recent issues:  Diabetes follow-up evaluation  Using the higher dose Ozempic 0.5 mg weekly, also the Relion NPH insulin daily  Rise in hgbA1c level, new plan to start [employer] Libre2 CGM... training visit today          Previous history of T2DM, diagnosed ~age 62  Recalls taking oral DM meds, including metformin  Had seen Dr. AMITA Flood/Herb, also Dr. RAMSES Daniel  ~2012 Began use of daily insulin  5/2019. Changed insulin from Levemir to Relion NPH              Initial dosing NPH 50U BID  Fall 2019. Patient concerned with low FBG, decided to stop the morning NPH insulin use    Current DM meds:   Relion NPH                 Subcutaneous 40U in evening  Ozempic 0.5 mg Subcutaneous weekly     Uses Accucheck Guide meter   FBG  mg/dl per patient    Recent HP labs include:      Previous FV labs include:       Recent FV labs include:          Lab Results   Component Value Date    A1C 9.2 (H) 05/04/2023     05/04/2023    POTASSIUM 4.9 05/04/2023    CHLORIDE 102 05/04/2023    CO2 26 05/04/2023    ANIONGAP 12 05/04/2023     (H) 05/04/2023    BUN 19.0 05/04/2023    CR 0.93 05/04/2023    GFRESTIMATED 63 05/04/2023    GFRESTBLACK 70 01/29/2021    LESTER 10.1 05/04/2023    CHOL 210 (H) 08/09/2021    TRIG 204 (H) 08/09/2021    HDL 44 (L) 08/09/2021     (H) 08/09/2021    NHDL 166 (H) 08/09/2021    UCRR 192 08/09/2021    MICROL 28 08/09/2021    UMALCR 14.58 08/09/2021     Lab Results   Component Value Date    TSH 2.14 08/09/2021     [facility] DR noted  DM complications:                Nephropathy:                          Microalbuminuria        Lives in [facility]  She sees Dr. Keila Stevens/[facility] for gen med evaluations     PMH/PSH:  Past Medical History:   Diagnosis Date    Asthma     Depression     Hyperlipidemia     Labile blood pressure     Obesity     Type 2 diabetes mellitus (H)      No past surgical history on file.    Family Hx:  No family history on  file.      Social Hx:  Social History     Socioeconomic History    Marital status:      Spouse name: Not on file    Number of children: Not on file    Years of education: Not on file    Highest education level: Not on file   Occupational History    Not on file   Tobacco Use    Smoking status: Former    Smokeless tobacco: Never   Substance and Sexual Activity    Alcohol use: No    Drug use: No    Sexual activity: Never     Partners: Male   Other Topics Concern    Not on file   Social History Narrative    Not on file     Social Determinants of Health     Financial Resource Strain: Not on file   Food Insecurity: Not on file   Transportation Needs: Not on file   Physical Activity: Not on file   Stress: Not on file   Social Connections: Not on file   Interpersonal Safety: Not on file   Housing Stability: Not on file          MEDICATIONS:  has a current medication list which includes the following prescription(s): albuterol, alprazolam, aspirin, atorvastatin, accu-chek guide, budesonide-formoterol, vitamin d3, freestyle selma 2 reader, freestyle selma 2 sensor, glimepiride, ibuprofen, novolin n relion, insulin syringe-needle u-100, lisinopril, metformin, multivitamin, omeprazole, semaglutide, sertraline, and vitamin b-12.     ROS: 10 point ROS neg other than the symptoms noted above in the HPI.     GENERAL: mild fatigue, wt stable; fevers, chills, malaise, night sweats.   HEENT: no dysphagia, odonophagia, diplopia, neck pain or tenderness  THYROID:  no apparent hyper or hypothyroid symptoms  CV:  Denies chest pain, pressure or palpitations  LUNGS:  Denies wheezing, cough, SOB, PEREZ  ABDOMEN: no diarrhea, constipation, abdominal pain  EXTREMITIES: less ankle swelling; no rashes, ulcers  NEUROLOGY: no changes in vision, tingling or numbness in hands or feet.   MSK: pains at knees, low back, and right hip pains denies muscle weakness  SKIN: no rashes or lesions  : nocturia 1-2x, no menses; no hot flashes or night  sweats  ENDOCRINE: no heat or cold intolerance      Physical Exam   VS: /72 (BP Location: Left arm, Patient Position: Sitting, Cuff Size: Adult Regular)   Wt 106.2 kg (234 lb 3.2 oz)   SpO2 91%   BMI 42.84 kg/m    GENERAL: AXOX3, NAD, well dressed, answering questions appropriately, appears stated age.  ENT: no nose swelling or nasal discharge, mouth redness or gum changes.  EYES: eyes grossly normal to inspection, conjunctivae and sclerae normal, no exophthalmos or proptosis  THYROID:  no apparent nodules or goiter  LUNGS: no audible wheeze, cough or visible cyanosis, or increased work of breathing  ABDOMEN: abdomen obese size  EXTREMITIES: mild ankle edema noted  NEUROLOGY: CN grossly intact, no tremors  MSK: grossly intact  SKIN:  Superficial v.veins at feet and ankles; no apparent skin lesions, rash, or edema with visualized skin appearance  PSYCH: mentation appears normal, affect normal/bright, judgement and insight intact,   normal speech and appearance well groomed       LABS:     All pertinent notes, labs, and images personally reviewed by me.        A/P:  No diagnosis found.    Comments:  Reviewed health history and overall diabetes issues.  I am surprised she discontinued diabetes medications without contact me  Reviewed and interpreted tests that I previously ordered.   Ordered appropriate tests for the endocrinology disease management.    Management options discussed and implemented after shared medical decision making with the patient.  T2DM problem is chronic-stable    Plan:  Discussed general issues with the diabetes mellitus diagnosis and management  We discussed the oevtrsmoqtE2l test which reflects previous overall glucose levels or control  Reviewed the importance of blood glucose (BG) testing to assess glucose trends  Provided general overview of diabetes (oral and injectable) medication use     Recommended:  Continue the Ozempic and NPH insulin medications:  Ozempic 0.5 mg  Subcutaneous  weekly  NPH 20U  Subcutaneous BID    Reviewed future option changing NPH BID to basal insulin every day  Check BGM levels fasting, presupper and bedtime for several days  Goal target  mg/dl    Discussed option of wearing the Freestyle Otilio 2 Personal CGM sensor and patient agreed.         Provided overview of the sensor insertion, use of [place], activation step, and scanning process       While receiving instruction from me, patient placed sensor on left upper arm and activated sensor       Plan to scan sensor after the initial 60-min warm up phase, scan 3-4x/day or when curious of sensor glucose level       Plan to remove sensor  in 14 days, also remove if significant local skin reaction or if dislodged.       Patient to message me with Libre2 update in several days   Patient needs repeat fasting labs soon   Discussed option of nearby [facility] or her [facility]   Lab orders available  Keep focus on diet, exercise, and weight management  Continue atorvastatin 40 mg po QD dose plan  Advise having fasting lipid panel testing and dilated eye examination, at least annually     See PCP about the ankle swelling, RSV vaccination question  Addressed patient questions today    There are no Patient Instructions on file for this visit.    Future labs ordered today: No orders of the defined types were placed in this encounter.    Radiology/Consults ordered today: None    Total time spent on day of encounter:  15 min    Follow-up:  12/2023 or 1/2024    DAMION Goldstein MD, MS  Endocrinology  [facility]

## 2023-09-27 NOTE — LETTER
9/27/2023         RE: Anahi Gramajo  [address]  [address]  [place] [address]        Dear Colleague,    Thank you for referring your patient, Anahi Gramajo, to the [facility]TY [facility]. Please see a copy of my visit note below.      Recent issues:  Diabetes follow-up evaluation  Using the higher dose Ozempic 0.5 mg weekly, also the Relion NPH insulin daily  Rise in hgbA1c level, new plan to start [employer] Libre2 CGM... training visit today          Previous history of T2DM, diagnosed ~age 62  Recalls taking oral DM meds, including metformin  Had seen Dr. AMITA Flood/Herb, also Dr. RAMSES Daniel  ~2012 Began use of daily insulin  5/2019. Changed insulin from Levemir to Relion NPH              Initial dosing NPH 50U BID  Fall 2019. Patient concerned with low FBG, decided to stop the morning NPH insulin use    Current DM meds:   Relion NPH                 Subcutaneous 40U in evening  Ozempic 0.5 mg Subcutaneous weekly     Uses Accucheck Guide meter   FBG  mg/dl per patient    Recent HP labs include:      Previous FV labs include:       Recent FV labs include:          Lab Results   Component Value Date    A1C 9.2 (H) 05/04/2023     05/04/2023    POTASSIUM 4.9 05/04/2023    CHLORIDE 102 05/04/2023    CO2 26 05/04/2023    ANIONGAP 12 05/04/2023     (H) 05/04/2023    BUN 19.0 05/04/2023    CR 0.93 05/04/2023    GFRESTIMATED 63 05/04/2023    GFRESTBLACK 70 01/29/2021    LESTER 10.1 05/04/2023    CHOL 210 (H) 08/09/2021    TRIG 204 (H) 08/09/2021    HDL 44 (L) 08/09/2021     (H) 08/09/2021    NHDL 166 (H) 08/09/2021    UCRR 192 08/09/2021    MICROL 28 08/09/2021    UMALCR 14.58 08/09/2021     Lab Results   Component Value Date    TSH 2.14 08/09/2021     [facility], no DR noted  DM complications:                Nephropathy:                          Microalbuminuria        Lives in [facility]  She sees Dr. Keila Stevens/[facility] for  gen med evaluations     PMH/PSH:  Past Medical History:   Diagnosis Date     Asthma      Depression      Hyperlipidemia      Labile blood pressure      Obesity      Type 2 diabetes mellitus (H)      No past surgical history on file.    Family Hx:  No family history on file.      Social Hx:  Social History     Socioeconomic History     Marital status:      Spouse name: Not on file     Number of children: Not on file     Years of education: Not on file     Highest education level: Not on file   Occupational History     Not on file   Tobacco Use     Smoking status: Former     Smokeless tobacco: Never   Substance and Sexual Activity     Alcohol use: No     Drug use: No     Sexual activity: Never     Partners: Male   Other Topics Concern     Not on file   Social History Narrative     Not on file     Social Determinants of Health     Financial Resource Strain: Not on file   Food Insecurity: Not on file   Transportation Needs: Not on file   Physical Activity: Not on file   Stress: Not on file   Social Connections: Not on file   Interpersonal Safety: Not on file   Housing Stability: Not on file          MEDICATIONS:  has a current medication list which includes the following prescription(s): albuterol, alprazolam, aspirin, atorvastatin, accu-chek guide, budesonide-formoterol, vitamin d3, freestyle selma 2 reader, freestyle selma 2 sensor, glimepiride, ibuprofen, novolin n relion, insulin syringe-needle u-100, lisinopril, metformin, multivitamin, omeprazole, semaglutide, sertraline, and vitamin b-12.     ROS: 10 point ROS neg other than the symptoms noted above in the HPI.     GENERAL: mild fatigue, wt stable; fevers, chills, malaise, night sweats.   HEENT: no dysphagia, odonophagia, diplopia, neck pain or tenderness  THYROID:  no apparent hyper or hypothyroid symptoms  CV:  Denies chest pain, pressure or palpitations  LUNGS:  Denies wheezing, cough, SOB, PEREZ  ABDOMEN: no diarrhea, constipation, abdominal  pain  EXTREMITIES: less ankle swelling; no rashes, ulcers  NEUROLOGY: no changes in vision, tingling or numbness in hands or feet.   MSK: pains at knees, low back, and right hip pains denies muscle weakness  SKIN: no rashes or lesions  : nocturia 1-2x, no menses; no hot flashes or night sweats  ENDOCRINE: no heat or cold intolerance      Physical Exam   VS: /72 (BP Location: Left arm, Patient Position: Sitting, Cuff Size: Adult Regular)   Wt 106.2 kg (234 lb 3.2 oz)   SpO2 91%   BMI 42.84 kg/m    GENERAL: AXOX3, NAD, well dressed, answering questions appropriately, appears stated age.  ENT: no nose swelling or nasal discharge, mouth redness or gum changes.  EYES: eyes grossly normal to inspection, conjunctivae and sclerae normal, no exophthalmos or proptosis  THYROID:  no apparent nodules or goiter  LUNGS: no audible wheeze, cough or visible cyanosis, or increased work of breathing  ABDOMEN: abdomen obese size  EXTREMITIES: mild ankle edema noted  NEUROLOGY: CN grossly intact, no tremors  MSK: grossly intact  SKIN:  Superficial v.veins at feet and ankles; no apparent skin lesions, rash, or edema with visualized skin appearance  PSYCH: mentation appears normal, affect normal/bright, judgement and insight intact,   normal speech and appearance well groomed       LABS:     All pertinent notes, labs, and images personally reviewed by me.        A/P:  No diagnosis found.    Comments:  Reviewed health history and overall diabetes issues.  I am surprised she discontinued diabetes medications without contact me  Reviewed and interpreted tests that I previously ordered.   Ordered appropriate tests for the endocrinology disease management.    Management options discussed and implemented after shared medical decision making with the patient.  T2DM problem is chronic-stable    Plan:  Discussed general issues with the diabetes mellitus diagnosis and management  We discussed the [id number] test which reflects  previous overall glucose levels or control  Reviewed the importance of blood glucose (BG) testing to assess glucose trends  Provided general overview of diabetes (oral and injectable) medication use     Recommended:  Continue the Ozempic and NPH insulin medications:  Ozempic 0.5 mg  Subcutaneous weekly  NPH 20U  Subcutaneous BID    Reviewed future option changing NPH BID to basal insulin every day  Check BGM levels fasting, presupper and bedtime for several days  Goal target  mg/dl    Discussed option of wearing the Freestyle Otilio 2 Personal CGM sensor and patient agreed.         Provided overview of the sensor insertion, use of [place], activation step, and scanning process       While receiving instruction from me, patient placed sensor on left upper arm and activated sensor       Plan to scan sensor after the initial 60-min warm up phase, scan 3-4x/day or when curious of sensor glucose level       Plan to remove sensor  in 14 days, also remove if significant local skin reaction or if dislodged.       Patient to message me with Libre2 update in several days   Patient needs repeat fasting labs soon   Discussed option of nearby [facility] or her [facility]   Lab orders available  Keep focus on diet, exercise, and weight management  Continue atorvastatin 40 mg po QD dose plan  Advise having fasting lipid panel testing and dilated eye examination, at least annually     See PCP about the ankle swelling, RSV vaccination question  Addressed patient questions today    There are no Patient Instructions on file for this visit.    Future labs ordered today: No orders of the defined types were placed in this encounter.    Radiology/Consults ordered today: None    Total time spent on day of encounter:  15 min    Follow-up:  12/2023 or 1/2024    DAMION Goldstein MD, MS  Endocrinology  [facility]                    Again, thank you for allowing me to participate in the care of your patient.         Sincerely,        Marco Goldstein MD

## 2023-09-27 NOTE — NURSING NOTE
Chief Complaint   Patient presents with    Diabetes     Libre2 placement.        Vitals:    09/27/23 1004   BP: 120/72   BP Location: Left arm   Patient Position: Sitting   Cuff Size: Adult Regular   SpO2: 91%   Weight: 106.2 kg (234 lb 3.2 oz)       Body mass index is 42.84 kg/m .      Wm Hillman, ICVF

## 2023-10-17 DIAGNOSIS — E78.5 HYPERLIPIDEMIA LDL GOAL <100: ICD-10-CM

## 2023-10-18 NOTE — TELEPHONE ENCOUNTER
Pending Prescriptions:                       Disp   Refills    atorvastatin (LIPITOR) 40 MG tablet       90 tab*1            Sig: Take 1 tablet (40 mg) by mouth daily    Routing refill request to provider for review/approval because:  Labs out of range:    LDL Cholesterol Calculated   Date Value Ref Range Status   08/09/2021 125 (H) <=100 mg/dL Final     Comment:     Age 0-19 years:  Desirable: 0-110 mg/dL   Borderline high: 110-129 mg/dL   High: >= 130 mg/dL    Age 20 years and older:  Desirable: <100mg/dL  Above desirable: 100-129 mg/dL   Borderline high: 130-159 mg/dL   High: 160-189 mg/dL   Very high: >= 190 mg/dL   01/29/2021 91 <100 mg/dL Final     Comment:     Desirable:       <100 mg/dl     Prieto Marks RN

## 2023-10-19 RX ORDER — ATORVASTATIN CALCIUM 40 MG/1
40 TABLET, FILM COATED ORAL DAILY
Qty: 90 TABLET | Refills: 1 | Status: SHIPPED | OUTPATIENT
Start: 2023-10-19 | End: 2024-04-11

## 2023-11-05 ENCOUNTER — HEALTH MAINTENANCE LETTER (OUTPATIENT)
Age: 78
End: 2023-11-05

## 2023-12-15 DIAGNOSIS — E11.9 TYPE 2 DIABETES MELLITUS WITHOUT COMPLICATION, WITH LONG-TERM CURRENT USE OF INSULIN (H): ICD-10-CM

## 2023-12-15 DIAGNOSIS — Z79.4 TYPE 2 DIABETES MELLITUS WITHOUT COMPLICATION, WITH LONG-TERM CURRENT USE OF INSULIN (H): ICD-10-CM

## 2023-12-15 RX ORDER — HUMAN INSULIN 100 [IU]/ML
INJECTION, SUSPENSION SUBCUTANEOUS
Qty: 40 ML | Refills: 0 | Status: SHIPPED | OUTPATIENT
Start: 2023-12-15 | End: 2024-03-17

## 2023-12-15 NOTE — TELEPHONE ENCOUNTER
"Last Written Prescription Date:  9/19/23  Last Fill Quantity: 40,  # refills: 0   Last office visit: 9/27/2023 with Dr. Goldstein  Future Office Visit:  1/16/24        Requested Prescriptions   Pending Prescriptions Disp Refills    insulin NPH (NOVOLIN N RELION) 100 UNIT/ML vial [Pharmacy Med Name: NovoLIN N ReliOn 100 UNIT/ML Subcutaneous Suspension] 40 mL 0     Sig: INJECT 50 TO 54 UNITS SUBCUTANEOUSLY TWICE DAILY AS  DIRECTED  .  TOTAL  DAILY  DOSE  IS  APPROXIMATELY  104  UNITS       Intermediate Acting Insulin Protocol Failed - 12/15/2023  1:25 PM        Failed - HgbA1C in past 3 or 6 months     If HgbA1C is 8 or greater, it needs to be on file within the past 3 months.  If less than 8, must be on file within the past 6 months.     Recent Labs   Lab Test 05/04/23  1100   A1C 9.2*             Passed - Serum creatinine on file in past 12 months     Recent Labs   Lab Test 05/04/23  1100   CR 0.93       Ok to refill medication if creatinine is low          Passed - Medication is active on med list        Passed - Patient is age 18 or older        Passed - Recent (6 mo) or future (30 days) visit within the authorizing provider's specialty     Patient had office visit in the last 6 months or has a visit in the next 30 days with authorizing provider or within the authorizing provider's specialty.  See \"Patient Info\" tab in inbasket, or \"Choose Columns\" in Meds & Orders section of the refill encounter.               Refills sent  Mita Humphreys RN    "

## 2024-01-08 ENCOUNTER — LAB (OUTPATIENT)
Dept: LAB | Facility: CLINIC | Age: 79
End: 2024-01-08
Payer: MEDICARE

## 2024-01-08 DIAGNOSIS — Z79.4 TYPE 2 DIABETES MELLITUS WITH MICROALBUMINURIA, WITH LONG-TERM CURRENT USE OF INSULIN (H): ICD-10-CM

## 2024-01-08 DIAGNOSIS — E11.29 TYPE 2 DIABETES MELLITUS WITH MICROALBUMINURIA, WITH LONG-TERM CURRENT USE OF INSULIN (H): ICD-10-CM

## 2024-01-08 DIAGNOSIS — R80.9 TYPE 2 DIABETES MELLITUS WITH MICROALBUMINURIA, WITH LONG-TERM CURRENT USE OF INSULIN (H): ICD-10-CM

## 2024-01-08 DIAGNOSIS — E78.5 HYPERLIPIDEMIA LDL GOAL <100: ICD-10-CM

## 2024-01-08 LAB
CHOLEST SERPL-MCNC: 151 MG/DL
CREAT UR-MCNC: 224 MG/DL
FASTING STATUS PATIENT QL REPORTED: YES
HBA1C MFR BLD: 8.6 % (ref 0–5.6)
HDLC SERPL-MCNC: 47 MG/DL
LDLC SERPL CALC-MCNC: 78 MG/DL
MICROALBUMIN UR-MCNC: 29.6 MG/L
MICROALBUMIN/CREAT UR: 13.21 MG/G CR (ref 0–25)
NONHDLC SERPL-MCNC: 104 MG/DL
TRIGL SERPL-MCNC: 131 MG/DL

## 2024-01-08 PROCEDURE — 82570 ASSAY OF URINE CREATININE: CPT

## 2024-01-08 PROCEDURE — 82043 UR ALBUMIN QUANTITATIVE: CPT

## 2024-01-08 PROCEDURE — 80061 LIPID PANEL: CPT

## 2024-01-08 PROCEDURE — 83036 HEMOGLOBIN GLYCOSYLATED A1C: CPT

## 2024-01-08 PROCEDURE — 36415 COLL VENOUS BLD VENIPUNCTURE: CPT

## 2024-01-16 ENCOUNTER — VIRTUAL VISIT (OUTPATIENT)
Dept: ENDOCRINOLOGY | Facility: CLINIC | Age: 79
End: 2024-01-16
Payer: MEDICARE

## 2024-01-16 DIAGNOSIS — R80.9 TYPE 2 DIABETES MELLITUS WITH MICROALBUMINURIA, WITH LONG-TERM CURRENT USE OF INSULIN (H): Primary | ICD-10-CM

## 2024-01-16 DIAGNOSIS — Z79.4 TYPE 2 DIABETES MELLITUS WITH MICROALBUMINURIA, WITH LONG-TERM CURRENT USE OF INSULIN (H): Primary | ICD-10-CM

## 2024-01-16 DIAGNOSIS — E11.29 TYPE 2 DIABETES MELLITUS WITH MICROALBUMINURIA, WITH LONG-TERM CURRENT USE OF INSULIN (H): Primary | ICD-10-CM

## 2024-01-16 PROCEDURE — 99213 OFFICE O/P EST LOW 20 MIN: CPT | Mod: 95 | Performed by: INTERNAL MEDICINE

## 2024-01-16 NOTE — Clinical Note
1/16/2024         RE: Anahi Gramajo  [address]  [address]  [place] [address]        Dear Colleague,    Thank you for referring your patient, Anahi Gramajo, to the [facility]. Please see a copy of my visit note below.    No notes on file    Again, thank you for allowing me to participate in the care of your patient.        Sincerely,        Marco Goldstein MD

## 2024-01-16 NOTE — PROGRESS NOTES
Virtual Visit Details    Type of service:  Video Visit   Video Start Time: 1:25 PM  Video End Time:1:45 PM    Originating Location (pt. Location): Home  Distant Location (provider location):  Off-site  Platform used for Video Visit: Jeannette        Recent issues:  Diabetes follow-up evaluation  Using the higher dose Ozempic 0.5 mg weekly, also the Novolin NPH Relion insulin daily  Reports that her Ozempic med cost much higher in 2024, but cheaper after she's met deductible          Previous history of T2DM, diagnosed ~age 62  Recalls taking oral DM meds, including metformin  Had seen Dr. AMITA Flood/Herb, also Dr. RAMSES Daniel  ~2012 Began use of daily insulin  5/2019. Changed insulin from Levemir to Relion NPH              Initial dosing NPH 50U BID  Fall 2019. Patient concerned with low FBG, decided to stop the morning NPH insulin use    Current DM meds:   Relion NPH                 Subcutaneous 40U in evening (sometimes takes extra 30U at lunchtime)  Ozempic 0.5 mg Subcutaneous weekly     Uses Accucheck Guide meter   Infrequent use  Has used a Otilio sensor, but states they didn't last long   Unclear if bumped off, fell off skin, or stopped working   Few weeks ago, CGM avg 14d 168, 20d 170, 90d 158    Recent [facility] labs include:      Previous [facility] labs include:       Recent [facility] labs include:          Lab Results   Component Value Date    A1C 8.6 (H) 01/08/2024     05/04/2023    POTASSIUM 4.9 05/04/2023    CHLORIDE 102 05/04/2023    CO2 26 05/04/2023    ANIONGAP 12 05/04/2023     (H) 05/04/2023    BUN 19.0 05/04/2023    CR 0.93 05/04/2023    GFRESTIMATED 63 05/04/2023    GFRESTBLACK 70 01/29/2021    LESTER 10.1 05/04/2023    CHOL 151 01/08/2024    TRIG 131 01/08/2024    HDL 47 (L) 01/08/2024    LDL 78 01/08/2024    NHDL 104 01/08/2024    UCRR 224.0 01/08/2024    MICROL 29.6 01/08/2024    UMALCR 13.21 01/08/2024     Lab Results   Component Value Date    TSH 2.14 08/09/2021     [facility]  [facility] noted  DM complications:                Nephropathy:                          Microalbuminuria        Lives in [facility]  She sees Dr. Keila Stevens/[facility] for gen med evaluations     PMH/PSH:  Past Medical History:   Diagnosis Date    Asthma     Depression     Hyperlipidemia     Labile blood pressure     Obesity     Type 2 diabetes mellitus (H)      No past surgical history on file.    Family Hx:  No family history on file.      Social Hx:  Social History     Socioeconomic History    Marital status:      Spouse name: Not on file    Number of children: Not on file    Years of education: Not on file    Highest education level: Not on file   Occupational History    Not on file   Tobacco Use    Smoking status: Former    Smokeless tobacco: Never   Substance and Sexual Activity    Alcohol use: No    Drug use: No    Sexual activity: Never     Partners: Male   Other Topics Concern    Not on file   Social History Narrative    Not on file     Social Determinants of Health     Financial Resource Strain: Not on file   Food Insecurity: Not on file   Transportation Needs: Not on file   Physical Activity: Not on file   Stress: Not on file   Social Connections: Not on file   Interpersonal Safety: Not on file   Housing Stability: Not on file          MEDICATIONS:  has a current medication list which includes the following prescription(s): aspirin, atorvastatin, novolin n relion, lisinopril, omeprazole, albuterol, alprazolam, accu-chek guide, budesonide-formoterol, vitamin d3, freestyle selma 2 reader, freestyle selma 2 sensor, glimepiride, ibuprofen, insulin syringe-needle u-100, metformin, multivitamin, semaglutide, sertraline, and vitamin b-12.     ROS: 10 point ROS neg other than the symptoms noted above in the HPI.     GENERAL: mild fatigue, wt stable; fevers, chills, malaise, night sweats.   HEENT: no dysphagia, odonophagia, diplopia, neck pain or tenderness  THYROID:  no apparent hyper or hypothyroid  symptoms  CV:  Denies chest pain, pressure or palpitations  LUNGS:  Denies wheezing, cough, SOB, PEREZ  ABDOMEN: no diarrhea, constipation, abdominal pain  EXTREMITIES: less ankle swelling; no rashes, ulcers  NEUROLOGY: no changes in vision, tingling or numbness in hands or feet.   MSK: pains at knees, low back, and right hip pains denies muscle weakness  SKIN: no rashes or lesions  : nocturia 1-2x, no menses; no hot flashes or night sweats  ENDOCRINE: no heat or cold intolerance    Physical Exam (visual exam)  VS:  no vital signs taken for video visit  CONSTITUTIONAL: healthy, alert and NAD, well dressed, answering questions appropriately  ENT: no nose swelling or nasal discharge, mouth redness or gum changes.  EYES: eyes grossly normal to inspection, conjunctivae and sclerae normal, no exophthalmos or proptosis  THYROID:  no apparent nodules or goiter  LUNGS: no audible wheeze, cough or visible cyanosis, no visible retractions or increased work of breathing  ABDOMEN: abdomen not evaluated  EXTREMITIES: no hand tremors, limited exam  NEUROLOGY: CN grossly intact, mentation intact and speech normal   SKIN:  no apparent skin lesions, rash, or edema with visualized skin appearance  PSYCH: mentation appears normal, affect normal/bright, judgement and insight intact,   normal speech and appearance well groomed         LABS:     All pertinent notes, labs, and images personally reviewed by me.        A/P:  Encounter Diagnosis   Name Primary?    Type 2 diabetes mellitus with microalbuminuria, with long-term current use of insulin (H) Yes         Comments:  Reviewed health history and overall diabetes issues.  Difficult to assess glycemic control without recent BGM or CGM data  Reviewed and interpreted tests that I previously ordered.   Ordered appropriate tests for the endocrinology disease management.    Management options discussed and implemented after shared medical decision making with the patient.  Diabetes problem  is chronic with exacerbation progression, hyperglycemia    Plan:  Discussed general issues with the diabetes mellitus diagnosis and management  We discussed the [id number] test which reflects previous overall glucose levels or control  Reviewed the importance of blood glucose (BG) testing to assess glucose trends  Provided general overview of diabetes (oral and injectable) medication use     Recommended:  Continue the Ozempic and NPH insulin medications, but use more consistent NPH dosing:  Relion NPH                 20U subcutaneous midday and 30U in late evening  Ozempic 0.5 mg Subcutaneous weekly    Reviewed future option changing NPH BID to basal insulin every day  Check BGM levels fasting, presupper and bedtime for several days  Goal target  mg/dl  Resume use of the Freestyle Otilio vs Libre2 CGM sensor  No labs ordered at this time  Keep focus on diet, exercise, and weight management  Continue atorvastatin 40 mg po QD dose plan  Advise having fasting lipid panel testing and dilated eye examination, at least annually  Contact our office if questions or issues with the diabetes management    Addressed patient questions today    There are no Patient Instructions on file for this visit.    Future labs ordered today: No orders of the defined types were placed in this encounter.    Radiology/Consults ordered today: None    Total time spent on day of encounter:  26 min    Follow-up:  3/27/24 at 11:45am, Return    DAMION Goldstein MD, MS  Endocrinology  [facility]

## 2024-03-08 DIAGNOSIS — E11.9 TYPE 2 DIABETES MELLITUS WITHOUT COMPLICATION, WITH LONG-TERM CURRENT USE OF INSULIN (H): ICD-10-CM

## 2024-03-08 DIAGNOSIS — Z79.4 TYPE 2 DIABETES MELLITUS WITHOUT COMPLICATION, WITH LONG-TERM CURRENT USE OF INSULIN (H): ICD-10-CM

## 2024-03-11 NOTE — TELEPHONE ENCOUNTER
Requested Prescriptions   Pending Prescriptions Disp Refills    NOVOLIN N RELION 100 UNIT/ML susp [Pharmacy Med Name: NovoLIN N ReliOn 100 UNIT/ML Subcutaneous Suspension] 40 mL 0     Sig: INJECT 50 TO 54 UNITS SUBCUTANEOUSLY TWICE DAILY AS DIRECTED. TOTAL DAILY DOSE IS APPROXIMATELY 104 UNITS       Insulin Protocol Failed - 3/8/2024  1:14 PM        Failed - Chart Review Required     Review Chart.    Do not approve if insulin is used in a pump.  Instead, direct refill request to the patient's endocrinologist.  If the patient doesn't have an endocrinologist, then send the refill to the patient's PCP for review            Passed - Medication is active on med list        Passed - Has GFR on file in past 12 months and most recent value is normal        Passed - Recent (6 mo) or future (90 days) visit within the authorizing provider's specialty     The patient must have completed an in-person or virtual visit within the past 6 months or has a future visit scheduled within the next 90 days with the authorizing provider s specialty.  Urgent care and e-visits do not quality as an office visit for this protocol.          Passed - Medication indicated for associated diagnosis     Medication is associated with one or more of the following diagnoses:   - Type 1 diabetes mellitus  - Type 2 diabetes mellitus  - Diabetic nephropathy; Prophylaxis  - Neuropathy due to diabetes mellitus; Prophylaxis  - Retinopathy due to diabetes mellitus; Prophylaxis  - Diabetes mellitus associated with cystic fibrosis  - Disorder of cardiovascular system; Prophylaxis - Type 1 diabetes mellitus   - Disorder of cardiovascular system; Prophylaxis - Type 2 diabetes mellitus            Passed - Patient is 18 years of age or older       Intermediate Acting Insulin Protocol Passed - 3/8/2024  1:14 PM        Passed - Serum creatinine on file in past 12 months     Recent Labs   Lab Test 05/04/23  1100   CR 0.93       Ok to refill medication if creatinine is  "low          Passed - HgbA1C in past 3 or 6 months     If HgbA1C is 8 or greater, it needs to be on file within the past 3 months.  If less than 8, must be on file within the past 6 months.     Recent Labs   Lab Test 01/08/24  1121   A1C 8.6*             Passed - Medication is active on med list        Passed - Patient is age 18 or older        Passed - Recent (6 mo) or future (30 days) visit within the authorizing provider's specialty     Patient had office visit in the last 6 months or has a visit in the next 30 days with authorizing provider or within the authorizing provider's specialty.  See \"Patient Info\" tab in inbasket, or \"Choose Columns\" in Meds & Orders section of the refill encounter.               Last Written Prescription Date:  12/15/23  Last Fill Quantity: 40mL,  # refills: 0   Last office visit: 9/27/2023 ; last virtual visit: 1/16/2024 with prescribing provider:  Dr Goldstein   Future Office Visit:  3/18/24    Per LOV with Dr Goldstein (1/16/24)    Adjusted Rx and routed to Dr Goldstein to review.    Gamaliel French RN              "

## 2024-03-17 RX ORDER — HUMAN INSULIN 100 [IU]/ML
INJECTION, SUSPENSION SUBCUTANEOUS
Qty: 30 ML | Refills: 5 | Status: SHIPPED | OUTPATIENT
Start: 2024-03-17 | End: 2024-04-30 | Stop reason: ALTCHOICE

## 2024-03-18 ENCOUNTER — OFFICE VISIT (OUTPATIENT)
Dept: ENDOCRINOLOGY | Facility: CLINIC | Age: 79
End: 2024-03-18
Payer: MEDICARE

## 2024-03-18 VITALS
SYSTOLIC BLOOD PRESSURE: 119 MMHG | DIASTOLIC BLOOD PRESSURE: 71 MMHG | BODY MASS INDEX: 42.62 KG/M2 | WEIGHT: 233 LBS | HEART RATE: 105 BPM

## 2024-03-18 DIAGNOSIS — E66.01 MORBID OBESITY (H): ICD-10-CM

## 2024-03-18 DIAGNOSIS — Z79.4 TYPE 2 DIABETES MELLITUS WITH MICROALBUMINURIA, WITH LONG-TERM CURRENT USE OF INSULIN (H): Primary | ICD-10-CM

## 2024-03-18 DIAGNOSIS — E11.29 TYPE 2 DIABETES MELLITUS WITH MICROALBUMINURIA, WITH LONG-TERM CURRENT USE OF INSULIN (H): Primary | ICD-10-CM

## 2024-03-18 DIAGNOSIS — R80.9 TYPE 2 DIABETES MELLITUS WITH MICROALBUMINURIA, WITH LONG-TERM CURRENT USE OF INSULIN (H): Primary | ICD-10-CM

## 2024-03-18 PROCEDURE — 95251 CONT GLUC MNTR ANALYSIS I&R: CPT | Performed by: INTERNAL MEDICINE

## 2024-03-18 PROCEDURE — 99214 OFFICE O/P EST MOD 30 MIN: CPT | Performed by: INTERNAL MEDICINE

## 2024-03-18 PROCEDURE — G2211 COMPLEX E/M VISIT ADD ON: HCPCS | Performed by: INTERNAL MEDICINE

## 2024-03-18 NOTE — PROGRESS NOTES
Recent issues:  Diabetes follow-up evaluation  Using the Ozempic 0.5 mg weekly, also the Novolin NPH Relion insulin daily...   hopeful to stop Ozempic due to constipation and cost  Had left torso shingles illness in 1/2024          Previous history of T2DM, diagnosed ~age 62  Recalls taking oral DM meds, including metformin  Had seen Dr. AMITA Flood/Herb, also Dr. RAMSES Daniel  ~2012 Began use of daily insulin  5/2019. Changed insulin from Levemir to Relion NPH              Initial dosing NPH 50U BID  Fall 2019. Patient concerned with low FBG, decided to stop the morning NPH insulin use  Added Ozempic medication with treatment plan    Current DM meds:   Relion NPH                 Subcutaneous 30U mid afternoon and 40U in evening   (misses mid afternoon dose 50% of the time)  Ozempic 0.5 mg Subcutaneous weekly     Uses Accucheck Guide meter   Infrequent use  Using the Freestyle Otilio CGM sensor  Recent Otilio data:          Recent [facility] labs include:      Previous [facility] labs include:       Recent [facility] labs include:          Lab Results   Component Value Date    A1C 8.6 (H) 01/08/2024     05/04/2023    POTASSIUM 4.9 05/04/2023    CHLORIDE 102 05/04/2023    CO2 26 05/04/2023    ANIONGAP 12 05/04/2023     (H) 05/04/2023    BUN 19.0 05/04/2023    CR 0.93 05/04/2023    GFRESTIMATED 63 05/04/2023    GFRESTBLACK 70 01/29/2021    LESTER 10.1 05/04/2023    CHOL 151 01/08/2024    TRIG 131 01/08/2024    HDL 47 (L) 01/08/2024    LDL 78 01/08/2024    NHDL 104 01/08/2024    UCRR 224.0 01/08/2024    MICROL 29.6 01/08/2024    UMALCR 13.21 01/08/2024     Lab Results   Component Value Date    TSH 2.14 08/09/2021     [facility], no DR noted  DM complications:                Nephropathy:                          Microalbuminuria        Lives in [facility]  She sees Dr. Keila Stevens/[facility] for gen med evaluations     PMH/PSH:  Past Medical History:   Diagnosis Date    Asthma     Depression      Hyperlipidemia     Labile blood pressure     Obesity     Type 2 diabetes mellitus (H)      No past surgical history on file.    Family Hx:  No family history on file.      Social Hx:  Social History     Socioeconomic History    Marital status:      Spouse name: Not on file    Number of children: Not on file    Years of education: Not on file    Highest education level: Not on file   Occupational History    Not on file   Tobacco Use    Smoking status: Former    Smokeless tobacco: Never   Substance and Sexual Activity    Alcohol use: No    Drug use: No    Sexual activity: Never     Partners: Male   Other Topics Concern    Not on file   Social History Narrative    Not on file     Social Determinants of Health     Financial Resource Strain: Not on file   Food Insecurity: Not on file   Transportation Needs: Not on file   Physical Activity: Not on file   Stress: Not on file   Social Connections: Not on file   Interpersonal Safety: Not on file   Housing Stability: Not on file          MEDICATIONS:  has a current medication list which includes the following prescription(s): albuterol, alprazolam, aspirin, atorvastatin, budesonide-formoterol, freestyle selma 2 reader, freestyle selma 2 sensor, ibuprofen, novolin n relion, lisinopril, omeprazole, semaglutide, sertraline, accu-chek guide, vitamin d3, glimepiride, insulin syringe-needle u-100, metformin, multivitamin, and vitamin b-12.     ROS: 10 point ROS neg other than the symptoms noted above in the HPI.     GENERAL: mild fatigue, wt stable; fevers, chills, malaise, night sweats.   HEENT: no dysphagia, odonophagia, diplopia, neck pain or tenderness  THYROID:  no apparent hyper or hypothyroid symptoms  CV:  Denies chest pain, pressure or palpitations  LUNGS:  Denies wheezing, cough, SOB, PEREZ  ABDOMEN: constipation; no diarrhea, abdominal pain  EXTREMITIES: less ankle swelling; no rashes, ulcers  NEUROLOGY: no changes in vision, tingling or numbness in hands or feet.    MSK: pains at knees, low back, and right hip pains denies muscle weakness  SKIN: no rashes or lesions  : nocturia 1-2x, no menses; no hot flashes or night sweats  ENDOCRINE: no heat or cold intolerance      Physical Exam   VS: /71   Pulse 105   Wt 105.7 kg (233 lb)   BMI 42.62 kg/m    GENERAL: AXOX3, NAD, well dressed, answering questions appropriately, appears stated age.  ENT: no nose swelling or nasal discharge, mouth redness or gum changes.  EYES: eyes grossly normal to inspection, conjunctivae and sclerae normal, no exophthalmos or proptosis  THYROID:  no apparent nodules or goiter  LUNGS: no audible wheeze, cough or visible cyanosis, or increased work of breathing  ABDOMEN: abdomen obese size  EXTREMITIES: normal appearance of feet but cool temp, pulses R/L DP 2/2; no edema noted  NEUROLOGY: CN grossly intact, no tremors  MSK: grossly intact  SKIN:  no apparent skin lesions, rash, or edema with visualized skin appearance  PSYCH: mentation appears normal, affect normal/bright, judgement and insight intact,   normal speech and appearance well groomed      LABS:     All pertinent notes, labs, and images personally reviewed by me.        A/P:  Encounter Diagnoses   Name Primary?    Type 2 diabetes mellitus with microalbuminuria, with long-term current use of insulin (H) Yes    Morbid obesity (H)      Comments:  Reviewed health history and overall diabetes issues.  Difficult to assess glycemic control without knowing which days the midday NPH not taken  Reviewed and interpreted tests that I previously ordered.   Ordered appropriate tests for the endocrinology disease management.    Management options discussed and implemented after shared medical decision making with the patient.  Diabetes problem is chronic with exacerbation progression, hyperglycemia    Plan:  Discussed general issues with the diabetes mellitus diagnosis and management  We discussed the [id number] test which reflects previous  overall glucose levels or control  Reviewed the importance of blood glucose (BG) testing to assess glucose trends  Provided general overview of diabetes (oral and injectable) medication use     Recommended:  Continue the Ozempic and NPH insulin medications, but use more consistent NPH dosing (BID every day!):  Relion NPH                 30U subcutaneous midday and 40U in late evening  Ozempic 0.5 mg Subcutaneous weekly    Stop the Ozempic medication when supply gone, since constipation and expenesive  Reviewed future option changing NPH BID to basal insulin every day  Check BGM levels fasting, presupper and bedtime for several days  Goal target  mg/dl  Resume use of the Freestyle Otilio vs Libre2 CGM sensor  Advise repeat [facility] Diab Education appt, review CGM trends and assist with med dosing  Diab Ed Referral placed    No labs ordered at this time  Keep focus on diet, exercise, and weight management  Continue atorvastatin 40 mg po QD dose plan  Advise having fasting lipid panel testing and dilated eye examination, at least annually  Contact our office if questions or issues with the diabetes management    Addressed patient questions today    The longitudinal plan of care for the endocrine problem(s) were addressed during this visit.  Due to added complexity of care,   we will continue to support the patient and the subsequent management of this condition with ongoing continuity of care.    There are no Patient Instructions on file for this visit.    Future labs ordered today:   Orders Placed This Encounter   Procedures    GLUCOSE MONITOR, 72 HOUR, PHYS INTERP    Adult Diabetes Education  Referral     Radiology/Consults ordered today: ADULT DIABETES EDUCATION  REFERRAL    Total time spent on day of encounter:  30 min    Follow-up:  7/18/24 at 11:30 am, Return    DAMION Goldstein MD, MS  Endocrinology  [facility]

## 2024-04-11 DIAGNOSIS — E78.5 HYPERLIPIDEMIA LDL GOAL <100: ICD-10-CM

## 2024-04-11 RX ORDER — ATORVASTATIN CALCIUM 40 MG/1
40 TABLET, FILM COATED ORAL DAILY
Qty: 90 TABLET | Refills: 1 | Status: SHIPPED | OUTPATIENT
Start: 2024-04-11 | End: 2024-10-02

## 2024-04-11 NOTE — TELEPHONE ENCOUNTER
Continue with therapy   Last Written Prescription Date:  10/19/23  Last Fill Quantity: 90,  # refills: 1   Last office visit: 3/18/2024    Future Office Visit: 7/18/24     Requested Prescriptions   Pending Prescriptions Disp Refills    atorvastatin (LIPITOR) 40 MG tablet [Pharmacy Med Name: ATORVASTATIN 40MG TABLETS] 90 tablet 1     Sig: TAKE 1 TABLET(40 MG) BY MOUTH DAILY       Antihyperlipidemic agents Passed - 4/11/2024  8:47 AM        Passed - LDL on file in the past 12 months        Passed - Medication is active on med list        Passed - Recent (12 mo) or future (90 days) visit within the authorizing provider's specialty     The patient must have completed an in-person or virtual visit within the past 12 months or has a future visit scheduled within the next 90 days with the authorizing provider s specialty.  Urgent care and e-visits do not quality as an office visit for this protocol.          Passed - Patient is age 18 years or older        Passed - No active pregnancy on record        Passed - No positive pregnancy test in past 12 mos           Refills sent  Mita Humphreys RN

## 2024-04-30 ENCOUNTER — ALLIED HEALTH/NURSE VISIT (OUTPATIENT)
Dept: EDUCATION SERVICES | Facility: CLINIC | Age: 79
End: 2024-04-30
Attending: INTERNAL MEDICINE
Payer: MEDICARE

## 2024-04-30 ENCOUNTER — TELEPHONE (OUTPATIENT)
Dept: ENDOCRINOLOGY | Facility: CLINIC | Age: 79
End: 2024-04-30

## 2024-04-30 DIAGNOSIS — R80.9 TYPE 2 DIABETES MELLITUS WITH MICROALBUMINURIA, WITH LONG-TERM CURRENT USE OF INSULIN (H): ICD-10-CM

## 2024-04-30 DIAGNOSIS — E11.29 TYPE 2 DIABETES MELLITUS WITH MICROALBUMINURIA, WITH LONG-TERM CURRENT USE OF INSULIN (H): ICD-10-CM

## 2024-04-30 DIAGNOSIS — Z79.4 TYPE 2 DIABETES MELLITUS WITH MICROALBUMINURIA, WITH LONG-TERM CURRENT USE OF INSULIN (H): ICD-10-CM

## 2024-04-30 PROCEDURE — G0108 DIAB MANAGE TRN  PER INDIV: HCPCS | Performed by: DIETITIAN, REGISTERED

## 2024-04-30 RX ORDER — INSULIN DEGLUDEC 200 U/ML
60 INJECTION, SOLUTION SUBCUTANEOUS DAILY
Qty: 15 ML | Refills: 11 | OUTPATIENT
Start: 2024-04-30 | End: 2024-05-06

## 2024-04-30 NOTE — PROGRESS NOTES
Diabetes Self-Management Education & Support    Presents for: CGM Review    Type of Service: In Person Visit      REPORTS:          Pt verbalized understanding of concepts discussed and recommendations provided today.       Continue education with the following diabetes management concepts: Reducing Risks and Healthy Coping    ASSESSMENT:  Anahi comes in today for CGM review and med adjustments. She has been using NPH BID for a long time, often missing daytime shot - 3-4 days/week. She uses Relion brand of NPH as she only has to pay $25/vial for this. We discussed that insulin prices have been capped for those on Medicare and that she could get a better/safer insulin for less money. She is agreeable. We reviewed use of Tresiba U200 once daily injection and I discussed a reasonable dose with Dr. Goldstein who suggested 60 units/day. She was instructed on insulin pen use and performed return demonstration. She is also paying $75/month for her Otilio 2 sensors and we reviewed Medicare Part B coverage for these - [facility] sensors to see if she can get better coverage. She is aware that this is mail order. We also spent time reviewing symptoms of diabetes distress - she expresses frustration, mental burden of managing diabetes and was tearful at end of visit. I encouraged to continue to talk about her emotions related to her diabetes, and other life stressors. She works with a psychiatrist and has had therapists in the past, is aware of these resources.     Glucose Patterns & Trends:  Time in range of  mg/dL, 54% of the time. Overnight & morning lows and then big spikes in blood sugars likely related to missed NPH dose midday     PLAN    Switch to Tresiba U200 60 units at HS, STOP the NPH insulin when starting the Tresiba  Otilio 2 CGMS sent to [facility] to run through Medicare Part B  Patient requesting A1C recheck, orders placed   Continue lifestyle  "adjustments - healthy, consistent meal plan    Topics to cover at upcoming visits: Reducing Risks and Healthy Coping    Follow-up: 6/4/24    See Care Plan for co-developed, patient-state behavior change goals.  AVS provided for patient today.    Education Materials Provided:  No new materials provided today      SUBJECTIVE/OBJECTIVE:  Presents for: CGM Review  Accompanied by: Self  Diabetes education in the past 24mo: Yes  Focus of Visit: CGM, Taking Medication  Type of CGM visit: Personal CGM Follow-up  Diabetes type: Type 2  Disease course: Getting harder to manage  How confident are you filling out medical forms by yourself:: Not Assessed  Diabetes management related comments/concerns: Dr. Goldstein told me to come here.  Transportation concerns: Yes  Difficulty affording diabetes medication?: Sometimes  Difficulty affording diabetes testing supplies?: Sometimes  Other concerns:: Physical impairment  Cultural Influences/Ethnic Background:  Choose not to answer    Diabetes Symptoms & Complications:  Weight trend: Stable  Symptom course: Stable  Disease course: Getting harder to manage  Complications assessed today?: No    Patient Problem List and Family Medical History reviewed for relevant medical history, current medical status, and diabetes risk factors.    Vitals:  There were no vitals taken for this visit.  Estimated body mass index is 42.62 kg/m  as calculated from the following:    Height as of 1/31/20: 1.575 m (5' 2\").    Weight as of 3/18/24: 105.7 kg (233 lb).   Last 3 BP:   BP Readings from Last 3 Encounters:   03/18/24 119/71   09/27/23 120/72   09/13/23 126/83       History   Smoking Status    Former   Smokeless Tobacco    Never       Labs:  Lab Results   Component Value Date    A1C 8.6 01/08/2024    A1C 7.4 01/29/2021     Lab Results   Component Value Date     05/04/2023     08/09/2021     01/29/2021     Lab Results   Component Value Date    LDL 78 01/08/2024    LDL 91 01/29/2021 " "    HDL Cholesterol   Date Value Ref Range Status   01/29/2021 46 (L) >49 mg/dL Final     Direct Measure HDL   Date Value Ref Range Status   01/08/2024 47 (L) >=50 mg/dL Final   ]  GFR Estimate   Date Value Ref Range Status   05/04/2023 63 >60 mL/min/1.73m2 Final     Comment:     eGFR calculated using 2021 CKD-EPI equation.   01/29/2021 60 (L) >60 mL/min/[1.73_m2] Final     Comment:     Non  GFR Calc  Starting 12/18/2018, serum creatinine based estimated GFR (eGFR) will be   calculated using the Chronic Kidney Disease Epidemiology Collaboration   (CKD-EPI) equation.       GFR Estimate If Black   Date Value Ref Range Status   01/29/2021 70 >60 mL/min/[1.73_m2] Final     Comment:      GFR Calc  Starting 12/18/2018, serum creatinine based estimated GFR (eGFR) will be   calculated using the Chronic Kidney Disease Epidemiology Collaboration   (CKD-EPI) equation.       Lab Results   Component Value Date    CR 0.93 05/04/2023    CR 0.94 01/29/2021     No results found for: \"MICROALBUMIN\"    Healthy Eating:  Healthy Eating Assessed Today: Yes  Cultural/Hinduism diet restrictions?: No  Do you have any food allergies or intolerances?: No  Meal planning/habits: None  Who cooks/prepares meals for you?: Self  Who purchases food in  your home?: Self  How many times a week on average do you eat food made away from home (restaurant/take-out)?: 5+  Meals include: Lunch, Dinner  Breakfast: skips  Lunch: fast food - hamburger OR chicken sandwich OR L&B meals  Dinner: [employer] meal  Other: Doesn't like to cook, eats out frequently or gets pre-made meals  Has patient met with a dietitian in the past?: Yes    Being Active:  Being Active Assessed Today: Yes  Exercise:: Currently not exercising  Barrier to exercise: Physical limitation    Monitoring:  Monitoring Assessed Today: Yes  Did patient bring glucose meter to appointment? : Yes  Blood Glucose Meter: CGM  Times checking blood sugar at home " (number): 4  Times checking blood sugar at home (per): Day  Blood glucose trend: Fluctuating    See above.     Taking Medications:  Diabetes Medication(s)       Biguanides       metFORMIN (GLUCOPHAGE) 1000 MG tablet Take 1 tablet (1,000 mg) by mouth 2 times daily (with meals)     Patient not taking: Reported on 1/16/2024       Insulin       insulin degludec (TRESIBA FLEXTOUCH) 200 UNIT/ML pen Inject 60 Units Subcutaneous daily       Sulfonylureas       glimepiride (AMARYL) 2 MG tablet Take 1.5 tablets (3 mg) by mouth daily as directed     Patient not taking: Reported on 1/16/2024       Incretin Mimetic Agents       semaglutide (OZEMPIC) 2 MG/3ML pen Inject 0.5 mg Subcutaneous every 7 days Inject 0.5 mg subcutaneous weekly as directed            Taking Medication Assessed Today: Yes  Current Treatments: Insulin Injections  Dose schedule: At bedtime, Pre-lunch  Given by: Patient  Problems taking diabetes medications regularly?: Yes  Diabetes medication side effects?: Yes    Problem Solving:  Problem Solving Assessed Today: Yes  Is the patient at risk for hypoglycemia?: Yes  Hypoglycemia Frequency: Weekly  Medical ID: No  Does patient have glucagon emergency kit?: No  Is the patient at risk for DKA?: No  Does patient have severe weather/disaster plan for diabetes management?: No  Does patient have sick day plan for diabetes management?: No              Reducing Risks:  Reducing Risks Assessed Today: No    Healthy Coping:  Healthy Coping Assessed Today: Yes  Emotional response to diabetes: Fear/Anxiety, Concern for health and well-being, Guilt/Self-blame, Depression  Informal Support system:: Friends, Children  Stage of change: PREPARATION (Decided to change - considering how)  Support resources: None  Patient Activation Measure Survey Score:       No data to display                  Care Plan and Education Provided:  Healthy Eating: Consistency in amount and timing of carbohydrate intake,   Monitoring: insurance  coverage of CGM,   Taking Medication: Action of prescribed medication(s), Side effects of prescribed medication(s), When to take medication(s), and Insulin Instruction - Insulin administration technique taught today. Patient verbalized understanding and was able to perform an accurate return demonstration of administration technique., and   Healthy Coping: Methods of coping with stress, Recognize feelings about diagnosis, Utilize support systems, and When to seek professional counseling      Andreea Robertson RD, LD, [facility]     Time Spent: 50 minutes  Encounter Type: Individual    Any diabetes medication dose changes were made via the CDE Protocol per the patient's referring provider. A copy of this encounter was shared with the provider.

## 2024-04-30 NOTE — TELEPHONE ENCOUNTER
PRIOR AUTHORIZATION DENIED    Medication: TRESIBA FLEXTOUCH 200 UNIT/ML SC SOPN  Insurance Company: Medicare Blue RX - Phone [phone] Fax [phone]  Denial Date: 4/30/2024  Denial Reason(s): See denial letter   Appeal Information: See denial letter

## 2024-04-30 NOTE — LETTER
4/30/2024         RE: Anahi Gramajo  [address]  [address]  [place] [address]        Dear Colleague,    Thank you for referring your patient, Anahi Gramajo, to the [facility] [place]. Please see a copy of my visit note below.    Diabetes Self-Management Education & Support    Presents for: CGM Review    Type of Service: In Person Visit      REPORTS:          Pt verbalized understanding of concepts discussed and recommendations provided today.       Continue education with the following diabetes management concepts: Reducing Risks and Healthy Coping    ASSESSMENT:  Anahi comes in today for CGM review and med adjustments. She has been using NPH BID for a long time, often missing daytime shot - 3-4 days/week. She uses [employer]ion brand of NPH as she only has to pay $25/vial for this. We discussed that insulin prices have been capped for those on Medicare and that she could get a better/safer insulin for less money. She is agreeable. We reviewed use of Tresiba U200 once daily injection and I discussed a reasonable dose with Dr. Goldstein who suggested 60 units/day. She was instructed on insulin pen use and performed return demonstration. She is also paying $75/month for her Otilio 2 sensors and we reviewed Medicare Part B coverage for these - [facility] sensors to see if she can get better coverage. She is aware that this is mail order. We also spent time reviewing symptoms of diabetes distress - she expresses frustration, mental burden of managing diabetes and was tearful at end of visit. I encouraged to continue to talk about her emotions related to her diabetes, and other life stressors. She works with a psychiatrist and has had therapists in the past, is aware of these resources.     Glucose Patterns & Trends:  Time in range of  mg/dL, 54% of the time. Overnight & morning lows and then big spikes in blood sugars likely related to  "missed NPH dose midday     PLAN    Switch to Tresiba U200 60 units at HS, STOP the NPH insulin when starting the Tresiba  Otilio 2 CGMS sent to [facility] to run through Medicare Part B  Patient requesting A1C recheck, orders placed   Continue lifestyle adjustments - healthy, consistent meal plan    Topics to cover at upcoming visits: Reducing Risks and Healthy Coping    Follow-up: 6/4/24    See Care Plan for co-developed, patient-state behavior change goals.  AVS provided for patient today.    Education Materials Provided:  No new materials provided today      SUBJECTIVE/OBJECTIVE:  Presents for: CGM Review  Accompanied by: Self  Diabetes education in the past 24mo: Yes  Focus of Visit: CGM, Taking Medication  Type of CGM visit: Personal CGM Follow-up  Diabetes type: Type 2  Disease course: Getting harder to manage  How confident are you filling out medical forms by yourself:: Not Assessed  Diabetes management related comments/concerns: Dr. Goldstein told me to come here.  Transportation concerns: Yes  Difficulty affording diabetes medication?: Sometimes  Difficulty affording diabetes testing supplies?: Sometimes  Other concerns:: Physical impairment  Cultural Influences/Ethnic Background:  Choose not to answer    Diabetes Symptoms & Complications:  Weight trend: Stable  Symptom course: Stable  Disease course: Getting harder to manage  Complications assessed today?: No    Patient Problem List and Family Medical History reviewed for relevant medical history, current medical status, and diabetes risk factors.    Vitals:  There were no vitals taken for this visit.  Estimated body mass index is 42.62 kg/m  as calculated from the following:    Height as of 1/31/20: 1.575 m (5' 2\").    Weight as of 3/18/24: 105.7 kg (233 lb).   Last 3 BP:   BP Readings from Last 3 Encounters:   03/18/24 119/71   09/27/23 120/72   09/13/23 126/83       History   Smoking Status     Former   Smokeless Tobacco     Never       Labs:  Lab " "Results   Component Value Date    A1C 8.6 01/08/2024    A1C 7.4 01/29/2021     Lab Results   Component Value Date     05/04/2023     08/09/2021     01/29/2021     Lab Results   Component Value Date    LDL 78 01/08/2024    LDL 91 01/29/2021     HDL Cholesterol   Date Value Ref Range Status   01/29/2021 46 (L) >49 mg/dL Final     Direct Measure HDL   Date Value Ref Range Status   01/08/2024 47 (L) >=50 mg/dL Final   ]  GFR Estimate   Date Value Ref Range Status   05/04/2023 63 >60 mL/min/1.73m2 Final     Comment:     eGFR calculated using 2021 CKD-EPI equation.   01/29/2021 60 (L) >60 mL/min/[1.73_m2] Final     Comment:     Non  GFR Calc  Starting 12/18/2018, serum creatinine based estimated GFR (eGFR) will be   calculated using the Chronic Kidney Disease Epidemiology Collaboration   (CKD-EPI) equation.       GFR Estimate If Black   Date Value Ref Range Status   01/29/2021 70 >60 mL/min/[1.73_m2] Final     Comment:      GFR Calc  Starting 12/18/2018, serum creatinine based estimated GFR (eGFR) will be   calculated using the Chronic Kidney Disease Epidemiology Collaboration   (CKD-EPI) equation.       Lab Results   Component Value Date    CR 0.93 05/04/2023    CR 0.94 01/29/2021     No results found for: \"MICROALBUMIN\"    Healthy Eating:  Healthy Eating Assessed Today: Yes  Cultural/Adventist diet restrictions?: No  Do you have any food allergies or intolerances?: No  Meal planning/habits: None  Who cooks/prepares meals for you?: Self  Who purchases food in  your home?: Self  How many times a week on average do you eat food made away from home (restaurant/take-out)?: 5+  Meals include: Lunch, Dinner  Breakfast: skips  Lunch: fast food - hamburger OR chicken sandwich OR L&B meals  Dinner: [employer] meal  Other: Doesn't like to cook, eats out frequently or gets pre-made meals  Has patient met with a dietitian in the past?: Yes    Being Active:  Being Active " Assessed Today: Yes  Exercise:: Currently not exercising  Barrier to exercise: Physical limitation    Monitoring:  Monitoring Assessed Today: Yes  Did patient bring glucose meter to appointment? : Yes  Blood Glucose Meter: CGM  Times checking blood sugar at home (number): 4  Times checking blood sugar at home (per): Day  Blood glucose trend: Fluctuating    See above.     Taking Medications:  Diabetes Medication(s)       Biguanides       metFORMIN (GLUCOPHAGE) 1000 MG tablet Take 1 tablet (1,000 mg) by mouth 2 times daily (with meals)     Patient not taking: Reported on 1/16/2024       Insulin       insulin degludec (TRESIBA FLEXTOUCH) 200 UNIT/ML pen Inject 60 Units Subcutaneous daily       Sulfonylureas       glimepiride (AMARYL) 2 MG tablet Take 1.5 tablets (3 mg) by mouth daily as directed     Patient not taking: Reported on 1/16/2024       Incretin Mimetic Agents       semaglutide (OZEMPIC) 2 MG/3ML pen Inject 0.5 mg Subcutaneous every 7 days Inject 0.5 mg subcutaneous weekly as directed            Taking Medication Assessed Today: Yes  Current Treatments: Insulin Injections  Dose schedule: At bedtime, Pre-lunch  Given by: Patient  Problems taking diabetes medications regularly?: Yes  Diabetes medication side effects?: Yes    Problem Solving:  Problem Solving Assessed Today: Yes  Is the patient at risk for hypoglycemia?: Yes  Hypoglycemia Frequency: Weekly  Medical ID: No  Does patient have glucagon emergency kit?: No  Is the patient at risk for DKA?: No  Does patient have severe weather/disaster plan for diabetes management?: No  Does patient have sick day plan for diabetes management?: No              Reducing Risks:  Reducing Risks Assessed Today: No    Healthy Coping:  Healthy Coping Assessed Today: Yes  Emotional response to diabetes: Fear/Anxiety, Concern for health and well-being, Guilt/Self-blame, Depression  Informal Support system:: Friends, Children  Stage of change: PREPARATION (Decided to change -  considering how)  Support resources: None  Patient Activation Measure Survey Score:       No data to display                  Care Plan and Education Provided:  Healthy Eating: Consistency in amount and timing of carbohydrate intake,   Monitoring: insurance coverage of CGM,   Taking Medication: Action of prescribed medication(s), Side effects of prescribed medication(s), When to take medication(s), and Insulin Instruction - Insulin administration technique taught today. Patient verbalized understanding and was able to perform an accurate return demonstration of administration technique., and   Healthy Coping: Methods of coping with stress, Recognize feelings about diagnosis, Utilize support systems, and When to seek professional counseling      Andreea Robertson RD, LD, [facility]     Time Spent: 50 minutes  Encounter Type: Individual    Any diabetes medication dose changes were made via the CDE Protocol per the patient's referring provider. A copy of this encounter was shared with the provider.

## 2024-04-30 NOTE — PATIENT INSTRUCTIONS
Switch to the Tresiba U200 and take 60 units at bedtime every day   STOP  taking the NPH/Relion insulin when you start the Tresiba   [facility] will call about refills of your sensors - if you need call them, their number is [phone]  Keep up the hard work with your diabetes management!     Call or send a Reflex message with any questions or concerns    Andreea Robertson RD, LD, [facility]   Diabetes Education Triage Line: [phone]  Diabetes Education Appointment [phone]

## 2024-04-30 NOTE — TELEPHONE ENCOUNTER
PA Initiation    Medication: TRESIBA FLEXTOUCH 200 UNIT/ML SC SOPN  Insurance Company: Medicare Blue RX - Phone [phone] Fax [phone]  Pharmacy Filling the Rx: [id number] [address] AT [facility]  Filling Pharmacy Phone: [phone]  Filling Pharmacy Fax: [phone]  Start Date: 4/30/2024     Key: [id number]

## 2024-05-03 ENCOUNTER — TELEPHONE (OUTPATIENT)
Dept: EDUCATION SERVICES | Facility: CLINIC | Age: 79
End: 2024-05-03
Payer: MEDICARE

## 2024-05-03 DIAGNOSIS — Z79.4 TYPE 2 DIABETES MELLITUS WITH MICROALBUMINURIA, WITH LONG-TERM CURRENT USE OF INSULIN (H): Primary | ICD-10-CM

## 2024-05-03 DIAGNOSIS — R80.9 TYPE 2 DIABETES MELLITUS WITH MICROALBUMINURIA, WITH LONG-TERM CURRENT USE OF INSULIN (H): Primary | ICD-10-CM

## 2024-05-03 DIAGNOSIS — E11.29 TYPE 2 DIABETES MELLITUS WITH MICROALBUMINURIA, WITH LONG-TERM CURRENT USE OF INSULIN (H): Primary | ICD-10-CM

## 2024-05-03 NOTE — TELEPHONE ENCOUNTER
Reviewed prescription denial.  Patient must try and fail Lantus and Toujeo prior to Tresiba being covered.  Called patient and expained the difference.  Will also run a test claim.    $35.00 copay Basaglar -prior auth required Toujeo -35.00 copay Levemir (product being discontinued) -prior auth required Tresiba -prior auth denied.    Recommend trying Lantus, Basaglar, or Toujeo.  PA specifically requires Lantus or Toujeo trial so would consider either of those first.     Farideh Shaffer MS, RD, LD, CDE

## 2024-05-03 NOTE — TELEPHONE ENCOUNTER
[facility] Call Center    Phone Message    May a detailed message be left on voicemail: yes     Reason for Call: Other: Patient called in and is upset with he trisiba being denied wants a call for diab edcuator.     Action Taken: Message routed to:  [facility] ([facility]): diab ed    Travel Screening: Not Applicable

## 2024-05-06 NOTE — TELEPHONE ENCOUNTER
Called out to patient.  Discussed change to lantus 0-0-0-60 units.  Patient verbalized understanding.  She will contact us with any issues with cost at the pharmacy.     Farideh Shaffer MS, RD, LD, CDE

## 2024-05-06 NOTE — TELEPHONE ENCOUNTER
Tresiba pen denial noted.  We will change Rx to the Lantus Solostar pen, sent today.    DAMION Goldstein MD, MS  Endocrinology  [facility]

## 2024-06-04 ENCOUNTER — ALLIED HEALTH/NURSE VISIT (OUTPATIENT)
Dept: EDUCATION SERVICES | Facility: CLINIC | Age: 79
End: 2024-06-04
Payer: MEDICARE

## 2024-06-04 DIAGNOSIS — Z79.4 TYPE 2 DIABETES MELLITUS WITHOUT COMPLICATION, WITH LONG-TERM CURRENT USE OF INSULIN (H): ICD-10-CM

## 2024-06-04 DIAGNOSIS — E11.9 TYPE 2 DIABETES MELLITUS WITHOUT COMPLICATION, WITH LONG-TERM CURRENT USE OF INSULIN (H): ICD-10-CM

## 2024-06-04 PROCEDURE — G0108 DIAB MANAGE TRN  PER INDIV: HCPCS | Performed by: DIETITIAN, REGISTERED

## 2024-06-04 RX ORDER — GLIPIZIDE 2.5 MG/1
2.5 TABLET, EXTENDED RELEASE ORAL DAILY
Qty: 90 TABLET | Refills: 0 | Status: SHIPPED | OUTPATIENT
Start: 2024-06-04 | End: 2024-07-12

## 2024-06-04 RX ORDER — GLIPIZIDE 2.5 MG/1
2.5 TABLET, EXTENDED RELEASE ORAL DAILY
Qty: 30 TABLET | Refills: 0 | Status: SHIPPED | OUTPATIENT
Start: 2024-06-04 | End: 2024-06-04

## 2024-06-04 NOTE — TELEPHONE ENCOUNTER
Pt req 90 day supply of glipizide.  1 mo was sent today.  Sent 90 day supply. Pt last appt 5/24, and next 9/24. Mita Humphreys RN

## 2024-06-04 NOTE — PATIENT INSTRUCTIONS
- Start the glipizide XL -- ok to start with 2.5 mg/day in the AM with breakfast for 3-4 days and then increase to 5 mg/day with breakfast   - If you're noticing low blood sugars on a regular basis, let me know ASAP and we can reassess  - Scan your Otilio a little more frequently so we don't have gaps in data -- midday and before bed   - No change in insulin dosing today     Call or send a Lightbox message with any questions or concerns    Andreea Robertson RD, LD, [facility]   Diabetes Education Triage Line: [phone]  Diabetes Education Appointment [phone]     Yes

## 2024-06-04 NOTE — LETTER
6/4/2024         RE: Anahi Gramjao  [address]  [address]  [facility] [address]        Dear Colleague,    Thank you for referring your patient, Anahi Gramajo, to the [facility] [address] [place]. Please see a copy of my visit note below.    Diabetes Self-Management Education & Support    Presents for: CGM Review    Type of Service: In Person Visit      REPORTS:          Pt verbalized understanding of concepts discussed and recommendations provided today.       Continue education with the following diabetes management concepts: Taking Medication, Problem Solving, Reducing Risks, and Healthy Coping    ASSESSMENT:  Anahi is upset to see very high blood sugars since switching to Lantus and stopping Ozempic, other diabetes meds. We reviewed blood sugar data. She is saving money on sensors since switching to DME. She is just not used to see such high spikes. She has not recently changed her diet at all. She is under more stress - her son is moving in with her soon, who struggles with significant mental health issues. Cost is a consistent issue for different med choices as well as tolerance -- she hasn't tolerated metformin, GLP1. Discussed her case with Dr. Goldstein -- decided to return to [facility].     Glucose Patterns & Trends:  Time in range of  mg/dL, 27% of the time., down significantly from last visit 2/2 med changes     PLAN    No change to insulin dosing  Start glipizide XL 5 mg/day with food. Orders placed per standing orders policy and verbal approval from Endocrinology.   Watch for low blood sugars, especially in the AM   Could consider use of SGLT2 in the future - per pharm liaison, would be $47/month for Farxiga or Jardiance     Topics to cover at upcoming visits: Reducing Risks and Healthy Coping    Follow-up: 7/18 w/ Endocrinology, 8/6 diabetes ed    See Care Plan for co-developed, patient-state behavior change goals.  AVS provided for patient today.    Education Materials  "Provided:  No new materials provided today      SUBJECTIVE/OBJECTIVE:  Presents for: CGM Review  Accompanied by: Self  Diabetes education in the past 24mo: Yes  Focus of Visit: CGM, Taking Medication  Type of CGM visit: Personal CGM Follow-up  Diabetes type: Type 2  Disease course: Worsening  How confident are you filling out medical forms by yourself:: Not Assessed  Diabetes management related comments/concerns: Seeing very high numbers since switching insulins  Transportation concerns: Yes  Difficulty affording diabetes medication?: Sometimes  Difficulty affording diabetes testing supplies?: Sometimes  Other concerns:: Physical impairment  Cultural Influences/Ethnic Background:  Choose not to answer    Diabetes Symptoms & Complications:  Weight trend: Stable  Symptom course: Stable  Disease course: Worsening  Complications assessed today?: No    Patient Problem List and Family Medical History reviewed for relevant medical history, current medical status, and diabetes risk factors.    Vitals:  There were no vitals taken for this visit.  Estimated body mass index is 42.62 kg/m  as calculated from the following:    Height as of 1/31/20: 1.575 m (5' 2\").    Weight as of 3/18/24: 105.7 kg (233 lb).   Last 3 BP:   BP Readings from Last 3 Encounters:   03/18/24 119/71   09/27/23 120/72   09/13/23 126/83       History   Smoking Status     Former   Smokeless Tobacco     Never       Labs:  Lab Results   Component Value Date    A1C 8.6 01/08/2024    A1C 7.4 01/29/2021     Lab Results   Component Value Date     05/04/2023     08/09/2021     01/29/2021     Lab Results   Component Value Date    LDL 78 01/08/2024    LDL 91 01/29/2021     HDL Cholesterol   Date Value Ref Range Status   01/29/2021 46 (L) >49 mg/dL Final     Direct Measure HDL   Date Value Ref Range Status   01/08/2024 47 (L) >=50 mg/dL Final   ]  GFR Estimate   Date Value Ref Range Status   05/04/2023 63 >60 mL/min/1.73m2 Final     Comment:    " " eGFR calculated using 2021 CKD-EPI equation.   01/29/2021 60 (L) >60 mL/min/[1.73_m2] Final     Comment:     Non  GFR Calc  Starting 12/18/2018, serum creatinine based estimated GFR (eGFR) will be   calculated using the Chronic Kidney Disease Epidemiology Collaboration   (CKD-EPI) equation.       GFR Estimate If Black   Date Value Ref Range Status   01/29/2021 70 >60 mL/min/[1.73_m2] Final     Comment:      GFR Calc  Starting 12/18/2018, serum creatinine based estimated GFR (eGFR) will be   calculated using the Chronic Kidney Disease Epidemiology Collaboration   (CKD-EPI) equation.       Lab Results   Component Value Date    CR 0.93 05/04/2023    CR 0.94 01/29/2021     No results found for: \"MICROALBUMIN\"    Healthy Eating:  Healthy Eating Assessed Today: No  Cultural/Tenriism diet restrictions?: No  Do you have any food allergies or intolerances?: No  Meal planning/habits: None  Who cooks/prepares meals for you?: Self  Who purchases food in  your home?: Self  How many times a week on average do you eat food made away from home (restaurant/take-out)?: 5+  Meals include: Lunch, Dinner  Breakfast: skips  Lunch: fast food - hamburger OR chicken sandwich OR L&B meals  Dinner: [employer] meal  Other: Doesn't like to cook, eats out frequently or gets pre-made meals  Has patient met with a dietitian in the past?: Yes    Being Active:  Being Active Assessed Today: No  Exercise:: Currently not exercising  Barrier to exercise: Physical limitation    Monitoring:  Monitoring Assessed Today: Yes  Did patient bring glucose meter to appointment? : Yes  Blood Glucose Meter: CGM  Times checking blood sugar at home (number): 2  Times checking blood sugar at home (per): Day  Blood glucose trend: Fluctuating    See above.     Taking Medications:  Diabetes Medication(s)       Biguanides       metFORMIN (GLUCOPHAGE) 1000 MG tablet Take 1 tablet (1,000 mg) by mouth 2 times daily (with meals)     " Patient not taking: Reported on 1/16/2024       Insulin       insulin glargine (LANTUS PEN) 100 UNIT/ML pen Inject 60 Units Subcutaneous daily       Sulfonylureas       glipiZIDE (GLUCOTROL XL) 2.5 MG 24 hr tablet TAKE 1 TABLET(2.5 MG) BY MOUTH DAILY       Incretin Mimetic Agents       semaglutide (OZEMPIC) 2 MG/3ML pen Inject 0.5 mg Subcutaneous every 7 days Inject 0.5 mg subcutaneous weekly as directed     Patient not taking: Reported on 6/4/2024            Taking Medication Assessed Today: Yes  Current Treatments: Insulin Injections  Dose schedule: At bedtime  Given by: Patient  Problems taking diabetes medications regularly?: No  Diabetes medication side effects?: No    Problem Solving:  Problem Solving Assessed Today: Yes  Is the patient at risk for hypoglycemia?: Yes  Hypoglycemia Frequency: Rarely  Medical ID: No  Does patient have glucagon emergency kit?: No  Is the patient at risk for DKA?: No  Does patient have severe weather/disaster plan for diabetes management?: No  Does patient have sick day plan for diabetes management?: No              Reducing Risks:  Reducing Risks Assessed Today: No    Healthy Coping:  Healthy Coping Assessed Today: Yes  Emotional response to diabetes: Fear/Anxiety, Concern for health and well-being, Guilt/Self-blame, Depression  Informal Support system:: Friends, Children  Stage of change: PREPARATION (Decided to change - considering how)  Support resources: None  Patient Activation Measure Survey Score:       No data to display                  Care Plan and Education Provided:  Taking Medication: Action of prescribed medication(s), Side effects of prescribed medication(s), and When to take medication(s) and Problem Solving: Low glucose - causes, signs/symptoms, treatment and prevention and Rule of 15 and carrying a carbohydrate source at all times in case of low glucose    Andreea Robertson, RD, LD, [facility]     Time Spent: 45 minutes  Encounter Type: Individual    Any diabetes  medication dose changes were made via the CDE Protocol per the patient's referring provider. A copy of this encounter was shared with the provider.

## 2024-06-04 NOTE — PROGRESS NOTES
Diabetes Self-Management Education & Support    Presents for: CGM Review    Type of Service: In Person Visit      REPORTS:          Pt verbalized understanding of concepts discussed and recommendations provided today.       Continue education with the following diabetes management concepts: Taking Medication, Problem Solving, Reducing Risks, and Healthy Coping    ASSESSMENT:  Anahi is upset to see very high blood sugars since switching to Lantus and stopping Ozempic, other diabetes meds. We reviewed blood sugar data. She is saving money on sensors since switching to DME. She is just not used to see such high spikes. She has not recently changed her diet at all. She is under more stress - her son is moving in with her soon, who struggles with significant mental health issues. Cost is a consistent issue for different med choices as well as tolerance -- she hasn't tolerated metformin, GLP1. Discussed her case with Dr. Goldstein -- decided to return to [facility].     Glucose Patterns & Trends:  Time in range of  mg/dL, 27% of the time., down significantly from last visit 2/2 med changes     PLAN    No change to insulin dosing  Start glipizide XL 5 mg/day with food. Orders placed per standing orders policy and verbal approval from Endocrinology.   Watch for low blood sugars, especially in the AM   Could consider use of SGLT2 in the future - per pharm liaison, would be $47/month for Farxiga or Jardiance     Topics to cover at upcoming visits: Reducing Risks and Healthy Coping    Follow-up: 7/18 w/ Endocrinology, 8/6 diabetes ed    See Care Plan for co-developed, patient-state behavior change goals.  AVS provided for patient today.    Education Materials Provided:  No new materials provided today      SUBJECTIVE/OBJECTIVE:  Presents for: CGM Review  Accompanied by: Self  Diabetes education in the past 24mo: Yes  Focus of Visit: CGM, Taking Medication  Type of CGM visit: Personal CGM Follow-up  Diabetes type: Type 2  Disease  "course: Worsening  How confident are you filling out medical forms by yourself:: Not Assessed  Diabetes management related comments/concerns: Seeing very high numbers since switching insulins  Transportation concerns: Yes  Difficulty affording diabetes medication?: Sometimes  Difficulty affording diabetes testing supplies?: Sometimes  Other concerns:: Physical impairment  Cultural Influences/Ethnic Background:  Choose not to answer    Diabetes Symptoms & Complications:  Weight trend: Stable  Symptom course: Stable  Disease course: Worsening  Complications assessed today?: No    Patient Problem List and Family Medical History reviewed for relevant medical history, current medical status, and diabetes risk factors.    Vitals:  There were no vitals taken for this visit.  Estimated body mass index is 42.62 kg/m  as calculated from the following:    Height as of 1/31/20: 1.575 m (5' 2\").    Weight as of 3/18/24: 105.7 kg (233 lb).   Last 3 BP:   BP Readings from Last 3 Encounters:   03/18/24 119/71   09/27/23 120/72   09/13/23 126/83       History   Smoking Status    Former   Smokeless Tobacco    Never       Labs:  Lab Results   Component Value Date    A1C 8.6 01/08/2024    A1C 7.4 01/29/2021     Lab Results   Component Value Date     05/04/2023     08/09/2021     01/29/2021     Lab Results   Component Value Date    LDL 78 01/08/2024    LDL 91 01/29/2021     HDL Cholesterol   Date Value Ref Range Status   01/29/2021 46 (L) >49 mg/dL Final     Direct Measure HDL   Date Value Ref Range Status   01/08/2024 47 (L) >=50 mg/dL Final   ]  GFR Estimate   Date Value Ref Range Status   05/04/2023 63 >60 mL/min/1.73m2 Final     Comment:     eGFR calculated using 2021 CKD-EPI equation.   01/29/2021 60 (L) >60 mL/min/[1.73_m2] Final     Comment:     Non  GFR Calc  Starting 12/18/2018, serum creatinine based estimated GFR (eGFR) will be   calculated using the Chronic Kidney Disease Epidemiology " "Collaboration   (CKD-EPI) equation.       GFR Estimate If Black   Date Value Ref Range Status   01/29/2021 70 >60 mL/min/[1.73_m2] Final     Comment:      GFR Calc  Starting 12/18/2018, serum creatinine based estimated GFR (eGFR) will be   calculated using the Chronic Kidney Disease Epidemiology Collaboration   (CKD-EPI) equation.       Lab Results   Component Value Date    CR 0.93 05/04/2023    CR 0.94 01/29/2021     No results found for: \"MICROALBUMIN\"    Healthy Eating:  Healthy Eating Assessed Today: No  Cultural/Buddhist diet restrictions?: No  Do you have any food allergies or intolerances?: No  Meal planning/habits: None  Who cooks/prepares meals for you?: Self  Who purchases food in  your home?: Self  How many times a week on average do you eat food made away from home (restaurant/take-out)?: 5+  Meals include: Lunch, Dinner  Breakfast: skips  Lunch: fast food - hamburger OR chicken sandwich OR L&B meals  Dinner: [employer] meal  Other: Doesn't like to cook, eats out frequently or gets pre-made meals  Has patient met with a dietitian in the past?: Yes    Being Active:  Being Active Assessed Today: No  Exercise:: Currently not exercising  Barrier to exercise: Physical limitation    Monitoring:  Monitoring Assessed Today: Yes  Did patient bring glucose meter to appointment? : Yes  Blood Glucose Meter: CGM  Times checking blood sugar at home (number): 2  Times checking blood sugar at home (per): Day  Blood glucose trend: Fluctuating    See above.     Taking Medications:  Diabetes Medication(s)       Biguanides       metFORMIN (GLUCOPHAGE) 1000 MG tablet Take 1 tablet (1,000 mg) by mouth 2 times daily (with meals)     Patient not taking: Reported on 1/16/2024       Insulin       insulin glargine (LANTUS PEN) 100 UNIT/ML pen Inject 60 Units Subcutaneous daily       Sulfonylureas       glipiZIDE (GLUCOTROL XL) 2.5 MG 24 hr tablet TAKE 1 TABLET(2.5 MG) BY MOUTH DAILY       Incretin Mimetic " Agents       semaglutide (OZEMPIC) 2 MG/3ML pen Inject 0.5 mg Subcutaneous every 7 days Inject 0.5 mg subcutaneous weekly as directed     Patient not taking: Reported on 6/4/2024            Taking Medication Assessed Today: Yes  Current Treatments: Insulin Injections  Dose schedule: At bedtime  Given by: Patient  Problems taking diabetes medications regularly?: No  Diabetes medication side effects?: No    Problem Solving:  Problem Solving Assessed Today: Yes  Is the patient at risk for hypoglycemia?: Yes  Hypoglycemia Frequency: Rarely  Medical ID: No  Does patient have glucagon emergency kit?: No  Is the patient at risk for DKA?: No  Does patient have severe weather/disaster plan for diabetes management?: No  Does patient have sick day plan for diabetes management?: No              Reducing Risks:  Reducing Risks Assessed Today: No    Healthy Coping:  Healthy Coping Assessed Today: Yes  Emotional response to diabetes: Fear/Anxiety, Concern for health and well-being, Guilt/Self-blame, Depression  Informal Support system:: Friends, Children  Stage of change: PREPARATION (Decided to change - considering how)  Support resources: None  Patient Activation Measure Survey Score:       No data to display                  Care Plan and Education Provided:  Taking Medication: Action of prescribed medication(s), Side effects of prescribed medication(s), and When to take medication(s) and Problem Solving: Low glucose - causes, signs/symptoms, treatment and prevention and Rule of 15 and carrying a carbohydrate source at all times in case of low glucose    Andreea Robertson RD, LD, [facility]     Time Spent: 45 minutes  Encounter Type: Individual    Any diabetes medication dose changes were made via the CDE Protocol per the patient's referring provider. A copy of this encounter was shared with the provider.

## 2024-07-05 ENCOUNTER — MYC MEDICAL ADVICE (OUTPATIENT)
Dept: ENDOCRINOLOGY | Facility: CLINIC | Age: 79
End: 2024-07-05
Payer: MEDICARE

## 2024-07-05 DIAGNOSIS — Z79.4 TYPE 2 DIABETES MELLITUS WITHOUT COMPLICATION, WITH LONG-TERM CURRENT USE OF INSULIN (H): ICD-10-CM

## 2024-07-05 DIAGNOSIS — E11.9 TYPE 2 DIABETES MELLITUS WITHOUT COMPLICATION, WITH LONG-TERM CURRENT USE OF INSULIN (H): ICD-10-CM

## 2024-07-05 RX ORDER — GLIPIZIDE 2.5 MG/1
2.5 TABLET, EXTENDED RELEASE ORAL DAILY
Qty: 30 TABLET | OUTPATIENT
Start: 2024-07-05

## 2024-07-05 NOTE — TELEPHONE ENCOUNTER
Last Written Prescription Date:  6/4/24  Last Fill Quantity: 90,  # refills: 0   Future Office Visit:  7/18/24 with Dr. Gimenez    90 day supply was sent 6/4/2024 - too soon for refill. Sent Mychart to pt advising this.   Kathy Terry RN on 7/5/2024 at 10:51 AM

## 2024-07-12 RX ORDER — GLIPIZIDE 2.5 MG/1
2.5 TABLET, EXTENDED RELEASE ORAL DAILY
Qty: 90 TABLET | Refills: 0 | Status: SHIPPED | OUTPATIENT
Start: 2024-07-12 | End: 2024-10-04

## 2024-07-12 NOTE — TELEPHONE ENCOUNTER
Called pharmacy to ask if pt had rec'd a 90 day supply of glipizide last month per current rx.  They only show an rx from last month for a 1 mo supply. Will re-send the 90 day supply. Mita Humphreys RN

## 2024-08-11 ENCOUNTER — HEALTH MAINTENANCE LETTER (OUTPATIENT)
Age: 79
End: 2024-08-11

## 2024-09-10 ENCOUNTER — TELEPHONE (OUTPATIENT)
Dept: ENDOCRINOLOGY | Facility: CLINIC | Age: 79
End: 2024-09-10
Payer: MEDICARE

## 2024-09-10 DIAGNOSIS — Z79.4 TYPE 2 DIABETES MELLITUS WITH MICROALBUMINURIA, WITH LONG-TERM CURRENT USE OF INSULIN (H): Primary | ICD-10-CM

## 2024-09-10 DIAGNOSIS — E11.29 TYPE 2 DIABETES MELLITUS WITH MICROALBUMINURIA, WITH LONG-TERM CURRENT USE OF INSULIN (H): Primary | ICD-10-CM

## 2024-09-10 DIAGNOSIS — E11.9 TYPE 2 DIABETES MELLITUS WITHOUT COMPLICATION, WITH LONG-TERM CURRENT USE OF INSULIN (H): ICD-10-CM

## 2024-09-10 DIAGNOSIS — R80.9 TYPE 2 DIABETES MELLITUS WITH MICROALBUMINURIA, WITH LONG-TERM CURRENT USE OF INSULIN (H): Primary | ICD-10-CM

## 2024-09-10 DIAGNOSIS — Z79.4 TYPE 2 DIABETES MELLITUS WITHOUT COMPLICATION, WITH LONG-TERM CURRENT USE OF INSULIN (H): ICD-10-CM

## 2024-09-10 NOTE — TELEPHONE ENCOUNTER
Patient Returning Call    Reason for call:  Pt would like to know if she needs a lab for blood work before her appt in October     Information relayed to patient:  Yes.    Patient has additional questions:  No      Could we [facility] this [facility] or would you prefer to receive a phone call?:   Patient would prefer a phone call   Okay to leave a detailed message?: Yes at Home number on file [phone] (home)

## 2024-09-11 NOTE — TELEPHONE ENCOUNTER
"Message reviewed.  Yes, patient should have a repeat \"lab appointment\" before her next endocrinology appointment.  These non-fasting lab orders available in her chart.  Please relay message to patient.    DAMION Goldstein MD, MS  Endocrinology  [facility]      "

## 2024-09-11 NOTE — TELEPHONE ENCOUNTER
Called pt to notify lab orders placed and # given to schedule appt. Pt verbalized understanding. Mita Humphreys RN

## 2024-09-26 ENCOUNTER — LAB (OUTPATIENT)
Dept: LAB | Facility: CLINIC | Age: 79
End: 2024-09-26
Payer: MEDICARE

## 2024-09-26 DIAGNOSIS — R80.9 TYPE 2 DIABETES MELLITUS WITH MICROALBUMINURIA, WITH LONG-TERM CURRENT USE OF INSULIN (H): ICD-10-CM

## 2024-09-26 DIAGNOSIS — E11.29 TYPE 2 DIABETES MELLITUS WITH MICROALBUMINURIA, WITH LONG-TERM CURRENT USE OF INSULIN (H): ICD-10-CM

## 2024-09-26 DIAGNOSIS — Z79.4 TYPE 2 DIABETES MELLITUS WITH MICROALBUMINURIA, WITH LONG-TERM CURRENT USE OF INSULIN (H): ICD-10-CM

## 2024-09-26 LAB
ALT SERPL W P-5'-P-CCNC: 15 U/L (ref 0–50)
ANION GAP SERPL CALCULATED.3IONS-SCNC: 11 MMOL/L (ref 7–15)
BUN SERPL-MCNC: 25.8 MG/DL (ref 8–23)
CALCIUM SERPL-MCNC: 9.4 MG/DL (ref 8.8–10.4)
CHLORIDE SERPL-SCNC: 102 MMOL/L (ref 98–107)
CREAT SERPL-MCNC: 0.97 MG/DL (ref 0.51–0.95)
EGFRCR SERPLBLD CKD-EPI 2021: 60 ML/MIN/1.73M2
EST. AVERAGE GLUCOSE BLD GHB EST-MCNC: 220 MG/DL
GLUCOSE SERPL-MCNC: 192 MG/DL (ref 70–99)
HBA1C MFR BLD: 9.3 % (ref 0–5.6)
HCO3 SERPL-SCNC: 25 MMOL/L (ref 22–29)
POTASSIUM SERPL-SCNC: 4.9 MMOL/L (ref 3.4–5.3)
SODIUM SERPL-SCNC: 138 MMOL/L (ref 135–145)

## 2024-09-26 PROCEDURE — 84460 ALANINE AMINO (ALT) (SGPT): CPT

## 2024-09-26 PROCEDURE — 36415 COLL VENOUS BLD VENIPUNCTURE: CPT

## 2024-09-26 PROCEDURE — 83036 HEMOGLOBIN GLYCOSYLATED A1C: CPT

## 2024-09-26 PROCEDURE — 80048 BASIC METABOLIC PNL TOTAL CA: CPT

## 2024-10-02 DIAGNOSIS — E78.5 HYPERLIPIDEMIA LDL GOAL <100: ICD-10-CM

## 2024-10-02 RX ORDER — ATORVASTATIN CALCIUM 40 MG/1
40 TABLET, FILM COATED ORAL DAILY
Qty: 90 TABLET | Refills: 0 | Status: SHIPPED | OUTPATIENT
Start: 2024-10-02

## 2024-10-02 NOTE — TELEPHONE ENCOUNTER
Last Written Prescription Date:  4/11/24  Last Fill Quantity: 90,  # refills: 1   Last office visit: 3/18/2024 ; last virtual visit: 1/16/2024 with prescribing provider:  Dr. Goldstein   Future Office Visit: Today    Requested Prescriptions   Pending Prescriptions Disp Refills    atorvastatin (LIPITOR) 40 MG tablet [Pharmacy Med Name: ATORVASTATIN 40MG TABLETS] 90 tablet 1     Sig: TAKE 1 TABLET(40 MG) BY MOUTH DAILY       Antihyperlipidemic agents Passed - 10/2/2024  8:13 AM        Passed - LDL on file in the past 12 months        Passed - Medication is active on med list        Passed - Recent (12 mo) or future (90 days) visit within the authorizing provider's specialty     The patient must have completed an in-person or virtual visit within the past 12 months or has a future visit scheduled within the next 90 days with the authorizing provider s specialty.  Urgent care and e-visits do not quality as an office visit for this protocol.          Passed - Patient is age 18 years or older        Passed - No active pregnancy on record        Passed - No positive pregnancy test in past 12 mos           Refills sent  Mita Humphreys RN

## 2024-10-04 ENCOUNTER — OFFICE VISIT (OUTPATIENT)
Dept: ENDOCRINOLOGY | Facility: CLINIC | Age: 79
End: 2024-10-04
Payer: MEDICARE

## 2024-10-04 VITALS
DIASTOLIC BLOOD PRESSURE: 82 MMHG | HEART RATE: 90 BPM | BODY MASS INDEX: 44.08 KG/M2 | WEIGHT: 241 LBS | SYSTOLIC BLOOD PRESSURE: 155 MMHG

## 2024-10-04 DIAGNOSIS — Z79.4 TYPE 2 DIABETES MELLITUS WITH MICROALBUMINURIA, WITH LONG-TERM CURRENT USE OF INSULIN (H): Primary | ICD-10-CM

## 2024-10-04 DIAGNOSIS — R80.9 TYPE 2 DIABETES MELLITUS WITH MICROALBUMINURIA, WITH LONG-TERM CURRENT USE OF INSULIN (H): Primary | ICD-10-CM

## 2024-10-04 DIAGNOSIS — E11.29 TYPE 2 DIABETES MELLITUS WITH MICROALBUMINURIA, WITH LONG-TERM CURRENT USE OF INSULIN (H): Primary | ICD-10-CM

## 2024-10-04 DIAGNOSIS — E66.01 MORBID OBESITY (H): ICD-10-CM

## 2024-10-04 PROCEDURE — 99215 OFFICE O/P EST HI 40 MIN: CPT | Performed by: INTERNAL MEDICINE

## 2024-10-04 PROCEDURE — G2211 COMPLEX E/M VISIT ADD ON: HCPCS | Performed by: INTERNAL MEDICINE

## 2024-10-04 RX ORDER — GLIPIZIDE 2.5 MG/1
2.5 TABLET, EXTENDED RELEASE ORAL DAILY
Qty: 90 TABLET | Refills: 1 | Status: SHIPPED | OUTPATIENT
Start: 2024-10-04

## 2024-10-04 NOTE — PROGRESS NOTES
Recent issues:  Diabetes follow-up evaluation  Had COVID-19 illness (and Paxlovid use) 6/2024, feeling better now  Previously on NPH BID and Ozempic, then changed to Lantus and glipizideER  No Libre2 data available today         Previous history of T2DM, diagnosed ~age 62  Recalls taking oral DM meds, including metformin  Had seen Dr. AMITA Flood/Herb, also Dr. RAMSES Daniel  ~2012 Began use of daily insulin  5/2019. Changed insulin from Levemir to Relion NPH              Initial dosing NPH 50U BID  Fall 2019. Patient concerned with low FBG, decided to stop the morning NPH insulin use  Added Ozempic medication, but Ozempic expensive  ~3/2024. Ozempic discontinued, changed to Lantus and glipizideER  Current DM meds:   Lantus Solostar 60U Subcutaneous late evening  glipizideER 2.5 mg 1-tab midday     Uses Accucheck Guide meter   Infrequent use  Using the Freestyle Otilio CGM sensor  Recent Libre2 data: no information available  Recent [facility] labs include:      Previous [facility] labs include:       Previous [facility] labs include:      Previous [facility] hgbA1c trends include:  Lab Results   Component Value Date    A1C 9.3 (H) 09/26/2024    A1C 8.6 (H) 01/08/2024    A1C 9.2 (H) 05/04/2023    A1C 8.5 (H) 11/21/2022    A1C 5.4 08/09/2021        Recent [facility] labs include:  Lab Results   Component Value Date    A1C 9.3 (H) 09/26/2024     09/26/2024    POTASSIUM 4.9 09/26/2024    CHLORIDE 102 09/26/2024    CO2 25 09/26/2024    ANIONGAP 11 09/26/2024     (H) 09/26/2024    BUN 25.8 (H) 09/26/2024    CR 0.97 (H) 09/26/2024    GFRESTIMATED 60 (L) 09/26/2024    GFRESTBLACK 70 01/29/2021    LESTER 9.4 09/26/2024    CHOL 151 01/08/2024    TRIG 131 01/08/2024    HDL 47 (L) 01/08/2024    LDL 78 01/08/2024    NHDL 104 01/08/2024    UCRR 224.0 01/08/2024    MICROL 29.6 01/08/2024    UMALCR 13.21 01/08/2024     Lab Results   Component Value Date    TSH 2.14 08/09/2021     [facility], no DR noted  DM complications:                 Nephropathy:                          Microalbuminuria        Lives in [facility], son has schizophrenia... lives with her  She sees Dr. Keila Stevens/[facility] for gen med evaluations     PMH/PSH:  Past Medical History:   Diagnosis Date    Asthma     Depression     Hyperlipidemia     Labile blood pressure     Obesity     Type 2 diabetes mellitus (H)      No past surgical history on file.    Family Hx:  No family history on file.      Social Hx:  Social History     Socioeconomic History    Marital status:      Spouse name: Not on file    Number of children: Not on file    Years of education: Not on file    Highest education level: Not on file   Occupational History    Not on file   Tobacco Use    Smoking status: Former    Smokeless tobacco: Never   Substance and Sexual Activity    Alcohol use: No    Drug use: No    Sexual activity: Never     Partners: Male   Other Topics Concern    Not on file   Social History Narrative    Not on file     Social Determinants of Health     Financial Resource Strain: Not on file   Food Insecurity: Not on file   Transportation Needs: Not on file   Physical Activity: Not on file   Stress: Not on file   Social Connections: Not on file   Interpersonal Safety: Not on file   Housing Stability: Not on file          MEDICATIONS:  has a current medication list which includes the following prescription(s): albuterol, alprazolam, aspirin, atorvastatin, freestyle selma 2 reader, glipizide, ibuprofen, insulin glargine, insulin pen needle, lisinopril, omeprazole, sertraline, sitagliptin, accu-chek guide, budesonide-formoterol, vitamin d3, freestyle selma 2 sensor, insulin syringe-needle u-100, metformin, multivitamin, and vitamin b-12.     ROS: 10 point ROS neg other than the symptoms noted above in the HPI.     GENERAL: mild fatigue, wt stable; fevers, chills, malaise, night sweats.   HEENT: no dysphagia, odonophagia, diplopia, neck pain or tenderness  THYROID:  no apparent hyper  or hypothyroid symptoms  CV:  Denies chest pain, pressure or palpitations  LUNGS:  denies wheezing, cough, SOB, PEREZ  ABDOMEN: constipation; no diarrhea, abdominal pain  EXTREMITIES: less ankle swelling; no rashes, ulcers  NEUROLOGY: no changes in vision, tingling or numbness in hands or feet.   MSK: pains at knees, low back, and right hip pains denies muscle weakness  SKIN: no rashes or lesions  : nocturia 1-2x, no menses; no hot flashes or night sweats  ENDOCRINE: no heat or cold intolerance      Physical Exam   VS: BP (!) 155/82   Pulse 90   Wt 109.3 kg (241 lb)   BMI 44.08 kg/m    GENERAL: AXOX3, NAD, well dressed, answering questions appropriately, appears stated age.  ENT: no nose swelling or nasal discharge, mouth redness or gum changes.  EYES: eyes grossly normal to inspection, conjunctivae and sclerae normal, no exophthalmos or proptosis  THYROID:  no apparent nodules or goiter  LUNGS: no audible wheeze, cough or visible cyanosis, or increased work of breathing  ABDOMEN: abdomen obese size  EXTREMITIES: normal appearance of feet; pulses R/L DP 2/2, 1st MTP bunion left foot; no edema noted  NEUROLOGY: slowed gait; CN grossly intact, no tremors  MSK: grossly intact  SKIN:  no apparent skin lesions, rash, or edema with visualized skin appearance  PSYCH: mentation appears normal, affect normal/bright, judgement and insight intact,   normal speech and appearance well groomed      LABS:     All pertinent notes, labs, and images personally reviewed by me.        A/P:  Encounter Diagnoses   Name Primary?    Type 2 diabetes mellitus with microalbuminuria, with long-term current use of insulin (H) Yes    Morbid obesity (H)        Comments:  Reviewed health history and overall diabetes issues.  Difficult to assess glycemic control without CGM or BGM trend data  High hgbA1c levels suggest significant postmeal hyperglycemia  Reviewed and interpreted tests that I previously ordered.   Ordered appropriate tests for  the endocrinology disease management.    Management options discussed and implemented after shared medical decision making with the patient.  Diabetes problem is chronic with exacerbation progression, hyperglycemia    Plan:  Discussed general issues with the diabetes mellitus diagnosis and management  We discussed the [id number] test which reflects previous overall glucose levels or control  Reviewed the importance of blood glucose (BG) testing to assess glucose trends  Provided general overview of diabetes (oral and injectable) medication use     Recommended:  Continue the Lantus and glipizideER, but add the DPP4-I tablet daily   Discussed the Januvia vs Tradjenta med options, dosing, expected benefits and possible SE's   New Januvia 100 mg tablet Rx sent to pharmacy  Would not restart Ozempic medication since GI symptoms and expensive  Check BGM levels fasting, presupper and bedtime and/or resume [employer] CGM   Message me with CGM data update in 1-2 weeks  Goal target  mg/dl  Sent updated Rx's to her pharmacy today  Advise repeat [facility] Diab Education appt, review CGM trends and assist with med dosing    Keep focus on diet, exercise, and weight management  Continue atorvastatin 40 mg po QD dose plan  Repeat lab testing in 12/2024 or 1/2025   Testing at nearest [facility] clinic lab   Lab orders placed  Advise having fasting lipid panel testing and dilated eye examination, at least annually  Contact our office if questions or issues with the diabetes management    Patient to follow up with their Primary Care Provider regarding the elevated blood pressure, arthralgias.  Addressed patient questions today    The longitudinal plan of care for the endocrine problem(s) were addressed during this visit.  Due to added complexity of care,   we will continue to support the patient and the subsequent management of this condition with ongoing continuity of care.    There are no Patient Instructions on file for this  visit.    Future labs ordered today:   Orders Placed This Encounter   Procedures    Hemoglobin A1c    Basic metabolic panel    Albumin Random Urine Quantitative with Creat Ratio    TSH     Radiology/Consults ordered today: None    Total time spent on day of encounter:  40 min    Follow-up:  1/22/25 VVAm,    4/22/25 Return    DAMION Goldstein MD, MS  Endocrinology  [facility]

## 2024-10-07 ENCOUNTER — TELEPHONE (OUTPATIENT)
Dept: ENDOCRINOLOGY | Facility: CLINIC | Age: 79
End: 2024-10-07
Payer: MEDICARE

## 2024-10-07 DIAGNOSIS — E11.29 TYPE 2 DIABETES MELLITUS WITH MICROALBUMINURIA, WITH LONG-TERM CURRENT USE OF INSULIN (H): ICD-10-CM

## 2024-10-07 DIAGNOSIS — Z79.4 TYPE 2 DIABETES MELLITUS WITH MICROALBUMINURIA, WITH LONG-TERM CURRENT USE OF INSULIN (H): ICD-10-CM

## 2024-10-07 DIAGNOSIS — R80.9 TYPE 2 DIABETES MELLITUS WITH MICROALBUMINURIA, WITH LONG-TERM CURRENT USE OF INSULIN (H): ICD-10-CM

## 2024-10-07 NOTE — TELEPHONE ENCOUNTER
This message is to inform you that we are in need of Medicare compliant prescriptions for FREESTYLE TAMARA 2 SENSOR.     Prescription must be written by: MD VINNIE XIONG  Must include full supply name: FREESTYLE TAMARA 2 SENSORS  Quantity: 6  Refills: 5  SIG: Change every 14 days    As a reminder, Medicare requires patients to be seen every 3 months for insulin supplies and every 6 months for CGMs. The provider who sees the patient must be the provider on the prescription, must be written on the day of or after office visit date and office visit must include notes about diabetes and insulin regimen. The Diabetes Care Services team at [facility] may request new prescriptions before renewal dates of the prescription is filled over the allowable amount. Writing the order to match what is above will help ensure we will only need to request every 3 or 6 months.    Thank you!    [facility]  Diabetes Care Services Team  [address] [place] [address]  Provider Phone: [phone]  Patient Line: [phone]  Fax: [phone]  E-mail: [email or web address]

## 2024-10-08 NOTE — TELEPHONE ENCOUNTER
Message reviewed, updated Freestyle Otilio 2 CGM Rx sent to pharmacy.    DAMION Goldstein MD, MS  Endocrinology  [facility]

## 2024-10-20 ENCOUNTER — HEALTH MAINTENANCE LETTER (OUTPATIENT)
Age: 79
End: 2024-10-20

## 2025-01-08 DIAGNOSIS — E78.5 HYPERLIPIDEMIA LDL GOAL <100: ICD-10-CM

## 2025-01-08 RX ORDER — ATORVASTATIN CALCIUM 40 MG/1
40 TABLET, FILM COATED ORAL DAILY
Qty: 90 TABLET | Refills: 0 | Status: SHIPPED | OUTPATIENT
Start: 2025-01-08

## 2025-01-08 NOTE — TELEPHONE ENCOUNTER
Last Written Prescription Date:  10/2/24  Last Fill Quantity: 90,  # refills: 0   Last office visit: 10/4/2024 ; last virtual visit: 1/16/2024 with prescribing provider:  Dr. Goldstein   Future Office Visit: 1/22/25     Requested Prescriptions   Pending Prescriptions Disp Refills    atorvastatin (LIPITOR) 40 MG tablet [Pharmacy Med Name: ATORVASTATIN 40MG TABLETS] 90 tablet 0     Sig: TAKE 1 TABLET(40 MG) BY MOUTH DAILY       Antihyperlipidemic agents Failed - 1/8/2025  2:52 PM        Failed - LDL on file in the past 12 months        Passed - Medication is active on med list        Passed - Recent (12 mo) or future (90 days) visit within the authorizing provider's specialty     The patient must have completed an in-person or virtual visit within the past 12 months or has a future visit scheduled within the next 90 days with the authorizing provider s specialty.  Urgent care and e-visits do not qualify as an office visit for this protocol.          Passed - Patient is age 18 years or older        Passed - No active pregnancy on record        Passed - No positive pregnancy test in past 12 mos           Refill sent  Mita Humphreys RN

## 2025-03-17 ENCOUNTER — MYC MEDICAL ADVICE (OUTPATIENT)
Dept: ENDOCRINOLOGY | Facility: CLINIC | Age: 80
End: 2025-03-17
Payer: MEDICARE

## 2025-03-18 DIAGNOSIS — E11.29 TYPE 2 DIABETES MELLITUS WITH MICROALBUMINURIA, WITH LONG-TERM CURRENT USE OF INSULIN (H): Primary | ICD-10-CM

## 2025-03-18 DIAGNOSIS — R80.9 TYPE 2 DIABETES MELLITUS WITH MICROALBUMINURIA, WITH LONG-TERM CURRENT USE OF INSULIN (H): Primary | ICD-10-CM

## 2025-03-18 DIAGNOSIS — Z79.4 TYPE 2 DIABETES MELLITUS WITH MICROALBUMINURIA, WITH LONG-TERM CURRENT USE OF INSULIN (H): Primary | ICD-10-CM

## 2025-04-12 ENCOUNTER — HEALTH MAINTENANCE LETTER (OUTPATIENT)
Age: 80
End: 2025-04-12

## 2025-04-14 ENCOUNTER — LAB (OUTPATIENT)
Dept: LAB | Facility: CLINIC | Age: 80
End: 2025-04-14
Payer: MEDICARE

## 2025-04-14 DIAGNOSIS — E66.01 MORBID OBESITY (H): ICD-10-CM

## 2025-04-14 DIAGNOSIS — E11.29 TYPE 2 DIABETES MELLITUS WITH MICROALBUMINURIA, WITH LONG-TERM CURRENT USE OF INSULIN (H): ICD-10-CM

## 2025-04-14 DIAGNOSIS — Z79.4 TYPE 2 DIABETES MELLITUS WITH MICROALBUMINURIA, WITH LONG-TERM CURRENT USE OF INSULIN (H): ICD-10-CM

## 2025-04-14 DIAGNOSIS — R80.9 TYPE 2 DIABETES MELLITUS WITH MICROALBUMINURIA, WITH LONG-TERM CURRENT USE OF INSULIN (H): ICD-10-CM

## 2025-04-14 LAB
ALT SERPL W P-5'-P-CCNC: 20 U/L (ref 0–50)
ANION GAP SERPL CALCULATED.3IONS-SCNC: 14 MMOL/L (ref 7–15)
BUN SERPL-MCNC: 24.6 MG/DL (ref 8–23)
CALCIUM SERPL-MCNC: 9.6 MG/DL (ref 8.8–10.4)
CHLORIDE SERPL-SCNC: 103 MMOL/L (ref 98–107)
CREAT SERPL-MCNC: 1.11 MG/DL (ref 0.51–0.95)
CREAT UR-MCNC: 468 MG/DL
EGFRCR SERPLBLD CKD-EPI 2021: 51 ML/MIN/1.73M2
EST. AVERAGE GLUCOSE BLD GHB EST-MCNC: 206 MG/DL
GLUCOSE SERPL-MCNC: 154 MG/DL (ref 70–99)
HBA1C MFR BLD: 8.8 % (ref 0–5.6)
HCO3 SERPL-SCNC: 24 MMOL/L (ref 22–29)
MICROALBUMIN UR-MCNC: 232 MG/L
MICROALBUMIN/CREAT UR: 49.57 MG/G CR (ref 0–25)
POTASSIUM SERPL-SCNC: 4.6 MMOL/L (ref 3.4–5.3)
SODIUM SERPL-SCNC: 141 MMOL/L (ref 135–145)
TSH SERPL DL<=0.005 MIU/L-ACNC: 2.4 UIU/ML (ref 0.3–4.2)

## 2025-04-14 PROCEDURE — 83036 HEMOGLOBIN GLYCOSYLATED A1C: CPT

## 2025-04-14 PROCEDURE — 84460 ALANINE AMINO (ALT) (SGPT): CPT

## 2025-04-14 PROCEDURE — 84443 ASSAY THYROID STIM HORMONE: CPT

## 2025-04-14 PROCEDURE — 82570 ASSAY OF URINE CREATININE: CPT

## 2025-04-14 PROCEDURE — 36415 COLL VENOUS BLD VENIPUNCTURE: CPT

## 2025-04-14 PROCEDURE — 80048 BASIC METABOLIC PNL TOTAL CA: CPT

## 2025-04-14 PROCEDURE — 82043 UR ALBUMIN QUANTITATIVE: CPT

## 2025-04-22 ENCOUNTER — OFFICE VISIT (OUTPATIENT)
Dept: ENDOCRINOLOGY | Facility: CLINIC | Age: 80
End: 2025-04-22
Payer: MEDICARE

## 2025-04-22 VITALS
DIASTOLIC BLOOD PRESSURE: 56 MMHG | BODY MASS INDEX: 45.18 KG/M2 | SYSTOLIC BLOOD PRESSURE: 102 MMHG | HEART RATE: 103 BPM | WEIGHT: 247 LBS

## 2025-04-22 DIAGNOSIS — Z79.4 TYPE 2 DIABETES MELLITUS WITH MICROALBUMINURIA, WITH LONG-TERM CURRENT USE OF INSULIN (H): Primary | ICD-10-CM

## 2025-04-22 DIAGNOSIS — R80.9 TYPE 2 DIABETES MELLITUS WITH MICROALBUMINURIA, WITH LONG-TERM CURRENT USE OF INSULIN (H): Primary | ICD-10-CM

## 2025-04-22 DIAGNOSIS — E78.5 HYPERLIPIDEMIA LDL GOAL <100: ICD-10-CM

## 2025-04-22 DIAGNOSIS — E11.29 TYPE 2 DIABETES MELLITUS WITH MICROALBUMINURIA, WITH LONG-TERM CURRENT USE OF INSULIN (H): Primary | ICD-10-CM

## 2025-04-22 PROCEDURE — 3078F DIAST BP <80 MM HG: CPT | Performed by: INTERNAL MEDICINE

## 2025-04-22 PROCEDURE — G2211 COMPLEX E/M VISIT ADD ON: HCPCS | Performed by: INTERNAL MEDICINE

## 2025-04-22 PROCEDURE — 99214 OFFICE O/P EST MOD 30 MIN: CPT | Performed by: INTERNAL MEDICINE

## 2025-04-22 PROCEDURE — 95251 CONT GLUC MNTR ANALYSIS I&R: CPT | Performed by: INTERNAL MEDICINE

## 2025-04-22 PROCEDURE — 3074F SYST BP LT 130 MM HG: CPT | Performed by: INTERNAL MEDICINE

## 2025-04-22 NOTE — PATIENT INSTRUCTIONS
Continue current diabetes med plan:  Lantus Solostar 60U Subcutaneous late evening  glipizideER 2.5 mg 2-tabs daily  Januvia 100 mg 1-tab daily    Consider changing from Januvia + Lantus to Soliqua injection daily  Dr. Goldstein will investigate Soliqua cost  Goal target premeal glucose  mg/dl  Continue use of Libre2 glucose sensor, but scan more often  Followup endocrinology appt 8/18/25 at 11:30 am    Establish care with new primary care provider soon   Consider [facility] or   [facility] in [place]

## 2025-04-22 NOTE — PROGRESS NOTES
Recent issues:  Diabetes follow-up evaluation  Previously on NPH BID and Ozempic, then changed to Lantus and glipizideER, with addition of Januvia  Had ankle swelling- cause unclear, better now         Previous history of T2DM, diagnosed ~age 62  Recalls taking oral DM meds, including metformin  Had seen Dr. AMITA Flood/Herb, also Dr. RAMSES Daniel  ~2012 Began use of daily insulin  5/2019. Changed insulin from Levemir to Relion NPH              Initial dosing NPH 50U BID  Fall 2019. Patient concerned with low FBG, decided to stop the morning NPH insulin use  Added Ozempic medication, but Ozempic expensive  ~3/2024. Ozempic discontinued, changed to Lantus and glipizideER  Current DM medications:   Lantus Solostar 60U Subcutaneous late evening  glipizideER 2.5 mg 1-tab daily  Januvia 100 mg 1-tab daily     Uses Accucheck Guide meter   Infrequent use  Using the Freestyle Otilio CGM sensor  Recent Libre2 data:         Recent [facility] labs include:      Previous [facility] labs include:       Previous [facility] labs include:      Previous [facility] hgbA1c trends include:  Lab Results   Component Value Date    A1C 8.8 (H) 04/14/2025    A1C 9.3 (H) 09/26/2024    A1C 8.6 (H) 01/08/2024    A1C 9.2 (H) 05/04/2023    A1C 8.5 (H) 11/21/2022        Recent [facility] labs include:  Lab Results   Component Value Date    A1C 8.8 (H) 04/14/2025     04/14/2025    POTASSIUM 4.6 04/14/2025    CHLORIDE 103 04/14/2025    CO2 24 04/14/2025    ANIONGAP 14 04/14/2025     (H) 04/14/2025    BUN 24.6 (H) 04/14/2025    CR 1.11 (H) 04/14/2025    GFRESTIMATED 51 (L) 04/14/2025    GFRESTBLACK 70 01/29/2021    LESTER 9.6 04/14/2025    CHOL 151 01/08/2024    TRIG 131 01/08/2024    HDL 47 (L) 01/08/2024    LDL 78 01/08/2024    NHDL 104 01/08/2024    UCRR 468.0 04/14/2025    MICROL 232.0 04/14/2025    UMALCR 49.57 (H) 04/14/2025     Lab Results   Component Value Date    TSH 2.40 04/14/2025     [facility] no DR noted  DM complications:                 Nephropathy:                          Microalbuminuria        L[facility], son has schizophrenia... lives with her  Has seen Dr. Keila Stevens/[facility] for general medicine evaluations     PMH/PSH:  Past Medical History:   Diagnosis Date    Asthma     Depression     Hyperlipidemia     Labile blood pressure     Obesity     Type 2 diabetes mellitus (H)      No past surgical history on file.    Family Hx:  No family history on file.      Social Hx:  Social History     Socioeconomic History    Marital status:      Spouse name: Not on file    Number of children: Not on file    Years of education: Not on file    Highest education level: Not on file   Occupational History    Not on file   Tobacco Use    Smoking status: Former    Smokeless tobacco: Never   Substance and Sexual Activity    Alcohol use: No    Drug use: No    Sexual activity: Never     Partners: Male   Other Topics Concern    Not on file   Social History Narrative    Not on file     Social Drivers of Health     Financial Resource Strain: Not on file   Food Insecurity: Not on file   Transportation Needs: Not on file   Physical Activity: Not on file   Stress: Not on file   Social Connections: Not on file   Interpersonal Safety: Not on file   Housing Stability: Not on file          MEDICATIONS:  has a current medication list which includes the following prescription(s): albuterol, alprazolam, aspirin, budesonide-formoterol, freestyle selma 2 reader, freestyle selma 2 sensor, glipizide, ibuprofen, insulin glargine, lisinopril, omeprazole, sertraline, atorvastatin, accu-chek guide, vitamin d3, insulin pen needle, insulin syringe-needle u-100, multivitamin, sitagliptin, and vitamin b-12.     ROS: 10 point ROS neg other than the symptoms noted above in the HPI.     GENERAL: mild fatigue, wt stable; fevers, chills, malaise, night sweats.   HEENT: no dysphagia, odonophagia, diplopia, neck pain or tenderness  THYROID:  no apparent hyper or  hypothyroid symptoms  CV:  Denies chest pain, pressure or palpitations  LUNGS:  denies wheezing, cough, SOB, PEREZ  ABDOMEN: constipation; no diarrhea, abdominal pain  EXTREMITIES: less ankle swelling; no rashes, ulcers  NEUROLOGY: no changes in vision, tingling or numbness in hands or feet.   MSK: pains at knees, low back, and right hip pains denies muscle weakness  SKIN: no rashes or lesions  : nocturia 1-2x, no menses; no hot flashes or night sweats  ENDOCRINE: no heat or cold intolerance      Physical Exam   VS: /56   Pulse 103   Wt 112 kg (247 lb)   BMI 45.18 kg/m    GENERAL: AXOX3, NAD, well dressed, answering questions appropriately, appears stated age.  ENT: no nose swelling or nasal discharge, mouth redness or gum changes.  EYES: eyes grossly normal to inspection, conjunctivae and sclerae normal, no exophthalmos or proptosis  THYROID:  no apparent nodules or goiter  LUNGS: no audible wheeze, cough or visible cyanosis, or increased work of breathing  ABDOMEN: abdomen obese size  EXTREMITIES: normal appearance of feet; pulses R/L DP 2/2, 1st MTP bunion left foot; no edema noted  NEUROLOGY: slowed gait; CN grossly intact, no tremors  MSK: grossly intact  SKIN:  no apparent skin lesions, rash, or edema with visualized skin appearance  PSYCH: mentation appears normal, affect normal/bright, judgement and insight intact,   normal speech and appearance well groomed      LABS:     All pertinent notes, labs, and images personally reviewed by me.        A/P:  Encounter Diagnoses   Name Primary?    Type 2 diabetes mellitus with microalbuminuria, with long-term current use of insulin (H) Yes    Hyperlipidemia LDL goal <100        Comments:  Reviewed health history and overall diabetes issues.  Difficult to assess glycemic control with limited CGM data  High hgbA1c levels with postmeal hyperglycemia  Reviewed and interpreted tests that I previously ordered.   Ordered appropriate tests for the endocrinology  disease management.    Management options discussed and implemented after shared medical decision making with the patient.  Diabetes problem is chronic with exacerbation progression, hyperglycemia    Plan:  Discussed general issues with the diabetes mellitus diagnosis and management  We discussed the [id number] test which reflects previous overall glucose levels or control  Reviewed the importance of blood glucose (BG) testing to assess glucose trends  Provided general overview of diabetes (oral and injectable) medication use  Reviewed recent Freestyle LIbre2 CGM glucose trend data, in detail     Recommended:  Continue the Lantus, glipizideER, and Januvia medication at this time  Would not restart Ozempic medication since GI symptoms and expensive  Consider stopping Lantus and Januvia, changing to Soliqua medication   Reviewed Soliqua medication, dose titration   Will check on Soliqua insurance coverage with our [facility] pharmacy liaison team  Continue use of Freestyle Libre2 CGM sensor  Need to scan sensor at mealtime and bedtime regularly  Goal premeal glucose  mg/dl  Consider follow-up [facility] Diab Education appt   Keep focus on diet, exercise, and weight loss management  Continue atorvastatin 40 mg po QD dose plan  Repeat lab testing iin 8/2025   Testing at nearest [facility] clinic lab   Lab orders placed  Advise having fasting lipid panel testing and dilated eye examination, at least annually  Contact our office if questions or issues with the diabetes management    Discussed importance of having a primary care practitioner for general medicine appointments, consider [facility] clinic  Addressed patient questions today    The longitudinal plan of care for the endocrine problem(s) were addressed during this visit.  Due to added complexity of care,   we will continue to support the patient and the subsequent management of this condition with ongoing continuity of care.    Patient Instructions   Continue current  diabetes med plan:  Lantus Solostar 60U Subcutaneous late evening  glipizideER 2.5 mg 2-tabs daily  Januvia 100 mg 1-tab daily    Consider changing from Januvia + Lantus to Soliqua injection daily  Dr. Goldstein will investigate Soliqua cost  Goal target premeal glucose  mg/dl  Continue use of Libre2 glucose sensor, but scan more often  Followup endocrinology appt 8/18/25 at 11:30 am    Establish care with new primary care provider soon   Consider [facility] or   [facility] in [place]    Future labs ordered today:   No orders of the defined types were placed in this encounter.    Radiology/Consults ordered today: None    Total time spent on day of encounter:  34 min    Follow-up:  8/18/25 Return    DAMION Goldstein MD, MS  Endocrinology  [facility]

## 2025-04-23 ENCOUNTER — TELEPHONE (OUTPATIENT)
Dept: ENDOCRINOLOGY | Facility: CLINIC | Age: 80
End: 2025-04-23
Payer: MEDICARE

## 2025-04-23 DIAGNOSIS — E78.5 HYPERLIPIDEMIA LDL GOAL <100: ICD-10-CM

## 2025-04-23 RX ORDER — ATORVASTATIN CALCIUM 40 MG/1
40 TABLET, FILM COATED ORAL DAILY
Qty: 90 TABLET | Refills: 1 | Status: SHIPPED | OUTPATIENT
Start: 2025-04-23

## 2025-04-23 NOTE — TELEPHONE ENCOUNTER
This message is to inform you that we are in need of Medicare compliant prescriptions for Freestyle Otilio 2 PLUS Sensors.     Prescription must be written by: Marco Goldstein.  Must include full supply name: Freestyle Otilio 2 PLUS Sensor  Quantity: 6  Refills: 1  SIG: Change every 15 days    As a reminder, Medicare requires patients to be seen every 3 months for insulin supplies and every 6 months for CGMs. The provider who sees the patient must be the provider on the prescription, must be written on the day of or after office visit date and office visit must include notes about diabetes and insulin regimen. The Diabetes Care Services team at [facility] may request new prescriptions before renewal dates of the prescription is filled over the allowable amount. Writing the order to match what is above will help ensure we will only need to request every 3 or 6 months.    Thank you!    [facility]  Diabetes Care Services Team  [address] [place] [address]  Provider Phone: [phone]  Patient Line: [phone]  Fax: [phone]  E-mail: [email or web address]

## 2025-04-23 NOTE — TELEPHONE ENCOUNTER
Last Written Prescription Date:  1/8/25  Last Fill Quantity: 90,  # refills: 0   Last office visit: 4/22/2025 ; last virtual visit: 1/16/2024 with prescribing provider:  Dr. Goldstein   Future Office Visit: 8/18/25     Requested Prescriptions   Pending Prescriptions Disp Refills    atorvastatin (LIPITOR) 40 MG tablet [Pharmacy Med Name: ATORVASTATIN 40MG TABLETS] 90 tablet 0     Sig: TAKE 1 TABLET(40 MG) BY MOUTH DAILY       Antihyperlipidemic agents Failed - 4/23/2025  3:58 PM        Failed - LDL on file in the past 12 months        Passed - Medication is active on med list and the sig matches. RN to manually verify dose and sig if red X/fail.     If the protocol passes (green check), you do not need to verify med dose and sig.    A prescription matches if they are the same clinical intention.    For Example: once daily and every morning are the same.    The protocol can not identify upper and lower case letters as matching and will fail.     For Example: Take 1 tablet (50 mg) by mouth daily     TAKE 1 TABLET (50 MG) BY MOUTH DAILY    For all fails (red x), verify dose and sig.    If the refill does match what is on file, the RN can still proceed to approve the refill request.       If they do not match, route to the appropriate provider.             Passed - Recent (12 mo) or future (90 days) visit within the authorizing provider's specialty     The patient must have completed an in-person or virtual visit within the past 12 months or has a future visit scheduled within the next 90 days with the authorizing provider s specialty.  Urgent care and e-visits do not qualify as an office visit for this protocol.          Passed - Patient is age 18 years or older        Passed - No active pregnancy on record        Passed - No positive pregnancy test in past 12 mos           Refills sent  Mita Humphreys RN

## 2025-04-24 DIAGNOSIS — E11.29 TYPE 2 DIABETES MELLITUS WITH MICROALBUMINURIA, WITH LONG-TERM CURRENT USE OF INSULIN (H): Primary | ICD-10-CM

## 2025-04-24 DIAGNOSIS — R80.9 TYPE 2 DIABETES MELLITUS WITH MICROALBUMINURIA, WITH LONG-TERM CURRENT USE OF INSULIN (H): Primary | ICD-10-CM

## 2025-04-24 DIAGNOSIS — Z79.4 TYPE 2 DIABETES MELLITUS WITH MICROALBUMINURIA, WITH LONG-TERM CURRENT USE OF INSULIN (H): Primary | ICD-10-CM

## 2025-04-24 RX ORDER — BLOOD-GLUCOSE SENSOR
EACH MISCELLANEOUS
Qty: 6 EACH | Refills: 1 | Status: SHIPPED | OUTPATIENT
Start: 2025-04-24

## 2025-04-24 NOTE — TELEPHONE ENCOUNTER
Message noted.  I have now sent an updated Freestyle Libre2 Plus Rx.    DAMION Goldstein MD, MS  Endocrinology  [facility]

## 2025-04-28 ENCOUNTER — MYC MEDICAL ADVICE (OUTPATIENT)
Dept: ENDOCRINOLOGY | Facility: CLINIC | Age: 80
End: 2025-04-28
Payer: MEDICARE

## 2025-05-05 DIAGNOSIS — Z79.4 TYPE 2 DIABETES MELLITUS WITH MICROALBUMINURIA, WITH LONG-TERM CURRENT USE OF INSULIN (H): ICD-10-CM

## 2025-05-05 DIAGNOSIS — E11.29 TYPE 2 DIABETES MELLITUS WITH MICROALBUMINURIA, WITH LONG-TERM CURRENT USE OF INSULIN (H): ICD-10-CM

## 2025-05-05 DIAGNOSIS — R80.9 TYPE 2 DIABETES MELLITUS WITH MICROALBUMINURIA, WITH LONG-TERM CURRENT USE OF INSULIN (H): ICD-10-CM

## 2025-05-05 RX ORDER — INSULIN GLARGINE 100 [IU]/ML
INJECTION, SOLUTION SUBCUTANEOUS
Qty: 18 ML | Refills: 5 | Status: SHIPPED | OUTPATIENT
Start: 2025-05-05

## 2025-05-05 NOTE — TELEPHONE ENCOUNTER
Last Written Prescription Date:  10/4/24  Last Fill Quantity: 18,  # refills: 5   Last office visit: 4/22/2025 ; last virtual visit: 1/16/2024 with prescribing provider:  Dr. Goldstein   Future Office Visit: 8/18/25     Requested Prescriptions   Pending Prescriptions Disp Refills    LANTUS SOLOSTAR 100 UNIT/ML soln [Pharmacy Med Name: LANTUS SOLOSTAR PEN INJ 3ML] 18 mL 5     Sig: INJECT 60 UNITS SUB-Q DAILY       Insulin Protocol Failed - 5/5/2025  2:52 PM        Failed - Medication is active on med list and the sig matches. RN to manually verify dose and sig if red X/fail.     If the protocol passes (green check), you do not need to verify med dose and sig.    A prescription matches if they are the same clinical intention.    For Example: once daily and every morning are the same.    The protocol can not identify upper and lower case letters as matching and will fail.     For Example: Take 1 tablet (50 mg) by mouth daily     TAKE 1 TABLET (50 MG) BY MOUTH DAILY    For all fails (red x), verify dose and sig.    If the refill does match what is on file, the RN can still proceed to approve the refill request.       If they do not match, route to the appropriate provider.             Failed - Chart review required     Review Chart.    Do not approve if insulin is used in a pump.  Instead, direct refill request to the patient's endocrinologist.  If the patient doesn't have an endocrinologist, then send the refill to the patient's PCP for review            Passed - HgbA1C in past 3 or 6 months     If HgbA1C is 8 or greater, it needs to be on file within the past 3 months.  If less than 8, must be on file within the past 6 months.     Recent Labs   Lab Test 04/14/25  1121   A1C 8.8*             Passed - Recent (6 mo) or future (90 days) visit within the authorizing provider's specialty     The patient must have completed an in-person or virtual visit within the past 6 months or has a future visit scheduled within the next  90 days with the authorizing provider s specialty.  Urgent care and e-visits do not quality as an office visit for this protocol.          Passed - Medication indicated for associated diagnosis     Medication is associated with one or more of the following diagnoses:   - Type 1 diabetes mellitus  - Type 2 diabetes mellitus  - Diabetic nephropathy; Prophylaxis  - Neuropathy due to diabetes mellitus; Prophylaxis  - Retinopathy due to diabetes mellitus; Prophylaxis  - Diabetes mellitus associated with cystic fibrosis  - Disorder of cardiovascular system; Prophylaxis - Type 1 diabetes mellitus   - Disorder of cardiovascular system; Prophylaxis - Type 2 diabetes mellitus            Passed - Patient is 18 years of age or older           Refills sent  Mita Humphreys RN

## 2025-05-12 DIAGNOSIS — E11.29 TYPE 2 DIABETES MELLITUS WITH MICROALBUMINURIA, WITH LONG-TERM CURRENT USE OF INSULIN (H): ICD-10-CM

## 2025-05-12 DIAGNOSIS — R80.9 TYPE 2 DIABETES MELLITUS WITH MICROALBUMINURIA, WITH LONG-TERM CURRENT USE OF INSULIN (H): ICD-10-CM

## 2025-05-12 DIAGNOSIS — Z79.4 TYPE 2 DIABETES MELLITUS WITH MICROALBUMINURIA, WITH LONG-TERM CURRENT USE OF INSULIN (H): ICD-10-CM

## 2025-05-12 RX ORDER — SITAGLIPTIN 100 MG/1
100 TABLET, FILM COATED ORAL DAILY
Qty: 90 TABLET | Refills: 1 | Status: SHIPPED | OUTPATIENT
Start: 2025-05-12

## 2025-05-12 NOTE — TELEPHONE ENCOUNTER
Last Written Prescription Date:  10/4/24  Last Fill Quantity: 90,  # refills: 1   Last office visit: 4/22/2025 ; last virtual visit: 1/16/2024 with prescribing provider:  Dr. Goldstein   Future Office Visit: 8/8/25     Requested Prescriptions   Pending Prescriptions Disp Refills    JANUVIA 100 MG tablet [Pharmacy Med Name: JANUVIA 100MG TABLETS] 90 tablet 1     Sig: TAKE 1 TABLET(100 MG) BY MOUTH DAILY       DPP4 Inhibitors Protocol Failed - 5/12/2025  3:25 PM        Failed - Has GFR on file in past 12 months and most recent value is normal        Failed - Normal serum creatinine in past 12 months     Recent Labs   Lab Test 04/14/25  1121   CR 1.11*                 Passed - HgbA1C in past 3 or 6 months     If HgbA1C is 8 or greater, it needs to be on file within the past 3 months.  If less than 8, must be on file within the past 6 months.     Recent Labs   Lab Test 04/14/25  1121   A1C 8.8*             Passed - Medication is active on med list and the sig matches. RN to manually verify dose and sig if red X/fail.     If the protocol passes (green check), you do not need to verify med dose and sig.    A prescription matches if they are the same clinical intention.    For Example: once daily and every morning are the same.    The protocol can not identify upper and lower case letters as matching and will fail.     For Example: Take 1 tablet (50 mg) by mouth daily     TAKE 1 TABLET (50 MG) BY MOUTH DAILY    For all fails (red x), verify dose and sig.    If the refill does match what is on file, the RN can still proceed to approve the refill request.       If they do not match, route to the appropriate provider.             Passed - Medication indicated for associated diagnosis     Medication is associated with one or more of the following diagnoses:     Type 2 diabetes mellitus   Maturity-onset diabetes of the young, type 3          Passed - Recent (6 mo) or future (90 days) visit within the authorizing provider's  "specialty     Patient had office visit in the last 6 months or has a visit in the next 30 days with authorizing provider or within the authorizing provider's specialty.  See \"Patient Info\" tab in inbasket, or \"Choose Columns\" in Meds & Orders section of the refill encounter.            Passed - Patient is age 18 or older        Passed - No active pregnancy on record        Passed - No positive pregnancy test in past 12 months           Refills sent  Mita Humphreys RN    "

## 2025-06-23 DIAGNOSIS — Z79.4 TYPE 2 DIABETES MELLITUS WITH MICROALBUMINURIA, WITH LONG-TERM CURRENT USE OF INSULIN (H): ICD-10-CM

## 2025-06-23 DIAGNOSIS — E11.29 TYPE 2 DIABETES MELLITUS WITH MICROALBUMINURIA, WITH LONG-TERM CURRENT USE OF INSULIN (H): ICD-10-CM

## 2025-06-23 DIAGNOSIS — R80.9 TYPE 2 DIABETES MELLITUS WITH MICROALBUMINURIA, WITH LONG-TERM CURRENT USE OF INSULIN (H): ICD-10-CM

## 2025-06-24 RX ORDER — GLIPIZIDE 2.5 MG/1
2.5 TABLET, EXTENDED RELEASE ORAL DAILY
Qty: 90 TABLET | Refills: 1 | Status: SHIPPED | OUTPATIENT
Start: 2025-06-24

## 2025-06-24 NOTE — TELEPHONE ENCOUNTER
Last Written Prescription Date:  10/4/24  Last Fill Quantity: 90,  # refills: 1   Last office visit: 4/22/2025 ; last virtual visit: 1/16/2024 with prescribing provider:  Dr. Goldstein   Future Office Visit: 8/18/25     Requested Prescriptions   Pending Prescriptions Disp Refills    glipiZIDE (GLUCOTROL XL) 2.5 MG 24 hr tablet [Pharmacy Med Name: GLIPIZIDE ER 2.5MG TABLETS] 90 tablet 1     Sig: TAKE 1 TABLET(2.5 MG) BY MOUTH DAILY       Sulfonylurea Agents Failed - 6/24/2025  1:30 PM        Failed - Has GFR on file in past 12 months and most recent value is normal        Failed - Patient has a recent creatinine (normal) within the past 12 mos.     Recent Labs   Lab Test 04/14/25  1121   CR 1.11*                 Passed - Patient has documented A1c within the specified period of time.     If HgbA1C is 8 or greater, it needs to be on file within the past 3 months.  If less than 8, must be on file within the past 6 months.     Recent Labs   Lab Test 04/14/25  1121   A1C 8.8*             Passed - Medication is active on med list and the sig matches. RN to manually verify dose and sig if red X/fail.     If the protocol passes (green check), you do not need to verify med dose and sig.    A prescription matches if they are the same clinical intention.    For Example: once daily and every morning are the same.    The protocol can not identify upper and lower case letters as matching and will fail.     For Example: Take 1 tablet (50 mg) by mouth daily     TAKE 1 TABLET (50 MG) BY MOUTH DAILY    For all fails (red x), verify dose and sig.    If the refill does match what is on file, the RN can still proceed to approve the refill request.       If they do not match, route to the appropriate provider.             Passed - Recent (6 month) or future (90 days) visit with the authorizing provider's specialty (provided they have been seen in the past 9 months)     The patient must have completed an in-person or virtual visit within  the past 6 months or has a future visit scheduled within the next 90 days with the authorizing provider s specialty.  Urgent care and e-visits do not quality as an office visit for this protocol.          Passed - Medication indicated for associated diagnosis     Medication is associated with one or more of the following diagnoses:  Maturity-onset diabetes of the young   Peripheral circulatory disorder due to type 2 diabetes mellitus   Type 2 diabetes mellitus           Passed - Patient is age 18 or older        Passed - No active pregnancy on record        Passed - Patient has not had a positive pregnancy test within the past 12 mos.           Refills sent  Mita Humphreys RN

## 2025-07-19 DIAGNOSIS — E78.5 HYPERLIPIDEMIA LDL GOAL <100: ICD-10-CM

## 2025-07-21 RX ORDER — ATORVASTATIN CALCIUM 40 MG/1
40 TABLET, FILM COATED ORAL DAILY
Qty: 90 TABLET | Refills: 1 | Status: SHIPPED | OUTPATIENT
Start: 2025-07-21

## 2025-07-21 NOTE — TELEPHONE ENCOUNTER
Requested Prescriptions   Pending Prescriptions Disp Refills    atorvastatin (LIPITOR) 40 MG tablet [Pharmacy Med Name: ATORVASTATIN 40MG TABLETS] 90 tablet 1     Sig: TAKE 1 TABLET(40 MG) BY MOUTH DAILY       Antihyperlipidemic agents Failed - 7/21/2025  9:41 AM        Failed - LDL on file in the past 12 months        Passed - Medication is active on med list and the sig matches. RN to manually verify dose and sig if red X/fail.     If the protocol passes (green check), you do not need to verify med dose and sig.    A prescription matches if they are the same clinical intention.    For Example: once daily and every morning are the same.    The protocol can not identify upper and lower case letters as matching and will fail.     For Example: Take 1 tablet (50 mg) by mouth daily     TAKE 1 TABLET (50 MG) BY MOUTH DAILY    For all fails (red x), verify dose and sig.    If the refill does match what is on file, the RN can still proceed to approve the refill request.       If they do not match, route to the appropriate provider.             Passed - Recent (12 month) or future (90 days) visit with authorizing provider's specialty (provided they have been seen in the past 15 months)     The patient must have completed an in-person or virtual visit within the past 12 months or has a future visit scheduled within the next 90 days with the authorizing provider s specialty.  Urgent care and e-visits do not qualify as an office visit for this protocol.          Passed - Patient is age 18 years or older        Passed - No active pregnancy on record        Passed - No positive pregnancy test in past 12 mos           Last Written Prescription Date:  4/23/25  Last Fill Quantity: 90 tab,  # refills: 1   Last office visit: 4/22/2025 ; last virtual visit: 1/16/2024 with prescribing provider:  Dr Goldstein   Future Office Visit:  8/18/25    Refill sent  Gamaliel French RN

## 2025-08-14 ENCOUNTER — LAB (OUTPATIENT)
Dept: LAB | Facility: CLINIC | Age: 80
End: 2025-08-14
Payer: MEDICARE

## 2025-08-14 DIAGNOSIS — Z79.4 TYPE 2 DIABETES MELLITUS WITH MICROALBUMINURIA, WITH LONG-TERM CURRENT USE OF INSULIN (H): ICD-10-CM

## 2025-08-14 DIAGNOSIS — R80.9 TYPE 2 DIABETES MELLITUS WITH MICROALBUMINURIA, WITH LONG-TERM CURRENT USE OF INSULIN (H): ICD-10-CM

## 2025-08-14 DIAGNOSIS — E11.29 TYPE 2 DIABETES MELLITUS WITH MICROALBUMINURIA, WITH LONG-TERM CURRENT USE OF INSULIN (H): ICD-10-CM

## 2025-08-14 LAB
ANION GAP SERPL CALCULATED.3IONS-SCNC: 15 MMOL/L (ref 7–15)
BUN SERPL-MCNC: 27.9 MG/DL (ref 8–23)
CALCIUM SERPL-MCNC: 9.5 MG/DL (ref 8.8–10.4)
CHLORIDE SERPL-SCNC: 102 MMOL/L (ref 98–107)
CREAT SERPL-MCNC: 1.11 MG/DL (ref 0.51–0.95)
EGFRCR SERPLBLD CKD-EPI 2021: 51 ML/MIN/1.73M2
EST. AVERAGE GLUCOSE BLD GHB EST-MCNC: 197 MG/DL
GLUCOSE SERPL-MCNC: 310 MG/DL (ref 70–99)
HBA1C MFR BLD: 8.5 % (ref 0–5.6)
HCO3 SERPL-SCNC: 21 MMOL/L (ref 22–29)
POTASSIUM SERPL-SCNC: 4.7 MMOL/L (ref 3.4–5.3)
SODIUM SERPL-SCNC: 138 MMOL/L (ref 135–145)

## 2025-08-18 ENCOUNTER — OFFICE VISIT (OUTPATIENT)
Dept: ENDOCRINOLOGY | Facility: CLINIC | Age: 80
End: 2025-08-18
Payer: MEDICARE

## 2025-08-18 VITALS — BODY MASS INDEX: 45.36 KG/M2 | WEIGHT: 248 LBS

## 2025-08-18 DIAGNOSIS — E66.01 MORBID OBESITY (H): ICD-10-CM

## 2025-08-18 DIAGNOSIS — Z79.4 TYPE 2 DIABETES MELLITUS WITH MICROALBUMINURIA, WITH LONG-TERM CURRENT USE OF INSULIN (H): Primary | ICD-10-CM

## 2025-08-18 DIAGNOSIS — R80.9 TYPE 2 DIABETES MELLITUS WITH MICROALBUMINURIA, WITH LONG-TERM CURRENT USE OF INSULIN (H): Primary | ICD-10-CM

## 2025-08-18 DIAGNOSIS — E11.29 TYPE 2 DIABETES MELLITUS WITH MICROALBUMINURIA, WITH LONG-TERM CURRENT USE OF INSULIN (H): Primary | ICD-10-CM

## 2025-08-18 PROCEDURE — 95251 CONT GLUC MNTR ANALYSIS I&R: CPT | Performed by: INTERNAL MEDICINE

## 2025-08-18 PROCEDURE — 3052F HG A1C>EQUAL 8.0%<EQUAL 9.0%: CPT | Performed by: INTERNAL MEDICINE

## 2025-08-18 PROCEDURE — 99214 OFFICE O/P EST MOD 30 MIN: CPT | Performed by: INTERNAL MEDICINE

## 2025-08-18 PROCEDURE — G2211 COMPLEX E/M VISIT ADD ON: HCPCS | Performed by: INTERNAL MEDICINE

## 2025-08-18 RX ORDER — BLOOD-GLUCOSE SENSOR
EACH MISCELLANEOUS
Qty: 6 EACH | Refills: 1 | Status: SHIPPED | OUTPATIENT
Start: 2025-08-18